# Patient Record
Sex: FEMALE | Race: WHITE | HISPANIC OR LATINO | Employment: OTHER | URBAN - METROPOLITAN AREA
[De-identification: names, ages, dates, MRNs, and addresses within clinical notes are randomized per-mention and may not be internally consistent; named-entity substitution may affect disease eponyms.]

---

## 2017-01-05 ENCOUNTER — ANESTHESIA (OUTPATIENT)
Dept: PERIOP | Facility: HOSPITAL | Age: 62
End: 2017-01-05

## 2017-01-05 ENCOUNTER — HOSPITAL ENCOUNTER (INPATIENT)
Facility: HOSPITAL | Age: 62
LOS: 4 days | Discharge: HOME HEALTH | End: 2017-01-09
Attending: ORTHOPAEDIC SURGERY | Admitting: ORTHOPAEDIC SURGERY

## 2017-01-05 ENCOUNTER — ANESTHESIA EVENT (OUTPATIENT)
Dept: PERIOP | Facility: HOSPITAL | Age: 62
End: 2017-01-05

## 2017-01-05 DIAGNOSIS — Z74.09 IMPAIRED FUNCTIONAL MOBILITY, BALANCE, GAIT, AND ENDURANCE: ICD-10-CM

## 2017-01-05 DIAGNOSIS — Z98.890 S/P DEBRIDEMENT: ICD-10-CM

## 2017-01-05 DIAGNOSIS — T81.31XD SURGICAL WOUND BREAKDOWN, SUBSEQUENT ENCOUNTER: ICD-10-CM

## 2017-01-05 DIAGNOSIS — S91.002D OPEN WOUND OF ANKLE WITH COMPLICATION, LEFT, SUBSEQUENT ENCOUNTER: Primary | ICD-10-CM

## 2017-01-05 PROBLEM — E78.5 HYPERLIPIDEMIA: Status: ACTIVE | Noted: 2017-01-05

## 2017-01-05 PROBLEM — Z87.81 STATUS POST ORIF OF FRACTURE OF ANKLE: Status: ACTIVE | Noted: 2017-01-05

## 2017-01-05 PROBLEM — T81.31XA SURGICAL WOUND BREAKDOWN: Status: ACTIVE | Noted: 2017-01-05

## 2017-01-05 PROBLEM — Z72.0 TOBACCO ABUSE: Status: ACTIVE | Noted: 2017-01-05

## 2017-01-05 LAB
ALBUMIN SERPL-MCNC: 4 G/DL (ref 3.2–4.8)
ALBUMIN/GLOB SERPL: 1.5 G/DL (ref 1.5–2.5)
ALP SERPL-CCNC: 80 U/L (ref 25–100)
ALT SERPL W P-5'-P-CCNC: 22 U/L (ref 7–40)
ANION GAP SERPL CALCULATED.3IONS-SCNC: 8 MMOL/L (ref 3–11)
AST SERPL-CCNC: 25 U/L (ref 0–33)
BASOPHILS # BLD AUTO: 0.01 10*3/MM3 (ref 0–0.2)
BASOPHILS NFR BLD AUTO: 0.2 % (ref 0–1)
BILIRUB SERPL-MCNC: 0.1 MG/DL (ref 0.3–1.2)
BUN BLD-MCNC: 8 MG/DL (ref 9–23)
BUN/CREAT SERPL: 11.4 (ref 7–25)
CALCIUM SPEC-SCNC: 9.5 MG/DL (ref 8.7–10.4)
CHLORIDE SERPL-SCNC: 106 MMOL/L (ref 99–109)
CK SERPL-CCNC: 75 U/L (ref 26–174)
CO2 SERPL-SCNC: 28 MMOL/L (ref 20–31)
CREAT BLD-MCNC: 0.7 MG/DL (ref 0.6–1.3)
CRP SERPL-MCNC: 4.9 MG/DL (ref 0–10)
DEPRECATED RDW RBC AUTO: 42.7 FL (ref 37–54)
EOSINOPHIL # BLD AUTO: 0.07 10*3/MM3 (ref 0.1–0.3)
EOSINOPHIL NFR BLD AUTO: 1.2 % (ref 0–3)
ERYTHROCYTE [DISTWIDTH] IN BLOOD BY AUTOMATED COUNT: 13 % (ref 11.3–14.5)
GFR SERPL CREATININE-BSD FRML MDRD: 85 ML/MIN/1.73
GLOBULIN UR ELPH-MCNC: 2.6 GM/DL
GLUCOSE BLD-MCNC: 118 MG/DL (ref 70–100)
HCT VFR BLD AUTO: 39.1 % (ref 34.5–44)
HGB BLD-MCNC: 13.2 G/DL (ref 11.5–15.5)
IMM GRANULOCYTES # BLD: 0.02 10*3/MM3 (ref 0–0.03)
IMM GRANULOCYTES NFR BLD: 0.4 % (ref 0–0.6)
LYMPHOCYTES # BLD AUTO: 1.07 10*3/MM3 (ref 0.6–4.8)
LYMPHOCYTES NFR BLD AUTO: 18.9 % (ref 24–44)
MCH RBC QN AUTO: 30 PG (ref 27–31)
MCHC RBC AUTO-ENTMCNC: 33.8 G/DL (ref 32–36)
MCV RBC AUTO: 88.9 FL (ref 80–99)
MONOCYTES # BLD AUTO: 0.11 10*3/MM3 (ref 0–1)
MONOCYTES NFR BLD AUTO: 1.9 % (ref 0–12)
NEUTROPHILS # BLD AUTO: 4.38 10*3/MM3 (ref 1.5–8.3)
NEUTROPHILS NFR BLD AUTO: 77.4 % (ref 41–71)
PLATELET # BLD AUTO: 225 10*3/MM3 (ref 150–450)
PMV BLD AUTO: 10.1 FL (ref 6–12)
POTASSIUM BLD-SCNC: 3.8 MMOL/L (ref 3.5–5.5)
PROT SERPL-MCNC: 6.6 G/DL (ref 5.7–8.2)
RBC # BLD AUTO: 4.4 10*6/MM3 (ref 3.89–5.14)
SODIUM BLD-SCNC: 142 MMOL/L (ref 132–146)
WBC NRBC COR # BLD: 5.66 10*3/MM3 (ref 3.5–10.8)

## 2017-01-05 PROCEDURE — 87116 MYCOBACTERIA CULTURE: CPT | Performed by: ORTHOPAEDIC SURGERY

## 2017-01-05 PROCEDURE — 25010000002 FENTANYL CITRATE (PF) 100 MCG/2ML SOLUTION: Performed by: NURSE ANESTHETIST, CERTIFIED REGISTERED

## 2017-01-05 PROCEDURE — 25010000002 BUPRENORPHINE PER 0.1 MG: Performed by: NURSE ANESTHETIST, CERTIFIED REGISTERED

## 2017-01-05 PROCEDURE — 87205 SMEAR GRAM STAIN: CPT | Performed by: ORTHOPAEDIC SURGERY

## 2017-01-05 PROCEDURE — 25010000002 MIDAZOLAM PER 1 MG: Performed by: NURSE ANESTHETIST, CERTIFIED REGISTERED

## 2017-01-05 PROCEDURE — 25010000002 VANCOMYCIN PER 500 MG: Performed by: ORTHOPAEDIC SURGERY

## 2017-01-05 PROCEDURE — 25010000002 ONDANSETRON PER 1 MG: Performed by: NURSE ANESTHETIST, CERTIFIED REGISTERED

## 2017-01-05 PROCEDURE — 25010000002 DEXAMETHASONE PER 1 MG: Performed by: NURSE ANESTHETIST, CERTIFIED REGISTERED

## 2017-01-05 PROCEDURE — 80053 COMPREHEN METABOLIC PANEL: CPT | Performed by: INTERNAL MEDICINE

## 2017-01-05 PROCEDURE — 25010000002 CEFTRIAXONE PER 250 MG: Performed by: ORTHOPAEDIC SURGERY

## 2017-01-05 PROCEDURE — 25010000002 DEXAMETHASONE SODIUM PHOSPHATE 10 MG/ML SOLUTION: Performed by: NURSE ANESTHETIST, CERTIFIED REGISTERED

## 2017-01-05 PROCEDURE — 87206 SMEAR FLUORESCENT/ACID STAI: CPT | Performed by: ORTHOPAEDIC SURGERY

## 2017-01-05 PROCEDURE — 82550 ASSAY OF CK (CPK): CPT | Performed by: INTERNAL MEDICINE

## 2017-01-05 PROCEDURE — 0QPJ04Z REMOVAL OF INTERNAL FIXATION DEVICE FROM RIGHT FIBULA, OPEN APPROACH: ICD-10-PCS | Performed by: ORTHOPAEDIC SURGERY

## 2017-01-05 PROCEDURE — 87102 FUNGUS ISOLATION CULTURE: CPT | Performed by: ORTHOPAEDIC SURGERY

## 2017-01-05 PROCEDURE — 87075 CULTR BACTERIA EXCEPT BLOOD: CPT | Performed by: ORTHOPAEDIC SURGERY

## 2017-01-05 PROCEDURE — 0HDMXZZ EXTRACTION OF RIGHT FOOT SKIN, EXTERNAL APPROACH: ICD-10-PCS | Performed by: ORTHOPAEDIC SURGERY

## 2017-01-05 PROCEDURE — 25010000002 PROPOFOL 10 MG/ML EMULSION: Performed by: NURSE ANESTHETIST, CERTIFIED REGISTERED

## 2017-01-05 PROCEDURE — 86140 C-REACTIVE PROTEIN: CPT | Performed by: INTERNAL MEDICINE

## 2017-01-05 PROCEDURE — 87176 TISSUE HOMOGENIZATION CULTR: CPT | Performed by: ORTHOPAEDIC SURGERY

## 2017-01-05 PROCEDURE — 87070 CULTURE OTHR SPECIMN AEROBIC: CPT | Performed by: ORTHOPAEDIC SURGERY

## 2017-01-05 PROCEDURE — 97162 PT EVAL MOD COMPLEX 30 MIN: CPT

## 2017-01-05 PROCEDURE — 85025 COMPLETE CBC W/AUTO DIFF WBC: CPT | Performed by: INTERNAL MEDICINE

## 2017-01-05 PROCEDURE — 88304 TISSUE EXAM BY PATHOLOGIST: CPT | Performed by: ORTHOPAEDIC SURGERY

## 2017-01-05 RX ORDER — LIDOCAINE HYDROCHLORIDE 10 MG/ML
1 INJECTION, SOLUTION EPIDURAL; INFILTRATION; INTRACAUDAL; PERINEURAL ONCE
Status: COMPLETED | OUTPATIENT
Start: 2017-01-05 | End: 2017-01-05

## 2017-01-05 RX ORDER — VANCOMYCIN HYDROCHLORIDE 1 G/200ML
1000 INJECTION, SOLUTION INTRAVENOUS ONCE
Status: COMPLETED | OUTPATIENT
Start: 2017-01-05 | End: 2017-01-05

## 2017-01-05 RX ORDER — PROMETHAZINE HYDROCHLORIDE 25 MG/1
25 SUPPOSITORY RECTAL ONCE AS NEEDED
Status: DISCONTINUED | OUTPATIENT
Start: 2017-01-05 | End: 2017-01-05 | Stop reason: HOSPADM

## 2017-01-05 RX ORDER — PROPOFOL 10 MG/ML
VIAL (ML) INTRAVENOUS AS NEEDED
Status: DISCONTINUED | OUTPATIENT
Start: 2017-01-05 | End: 2017-01-05 | Stop reason: SURG

## 2017-01-05 RX ORDER — PROMETHAZINE HYDROCHLORIDE 25 MG/1
25 TABLET ORAL ONCE AS NEEDED
Status: DISCONTINUED | OUTPATIENT
Start: 2017-01-05 | End: 2017-01-05 | Stop reason: HOSPADM

## 2017-01-05 RX ORDER — DEXAMETHASONE SODIUM PHOSPHATE 4 MG/ML
INJECTION, SOLUTION INTRA-ARTICULAR; INTRALESIONAL; INTRAMUSCULAR; INTRAVENOUS; SOFT TISSUE AS NEEDED
Status: DISCONTINUED | OUTPATIENT
Start: 2017-01-05 | End: 2017-01-05 | Stop reason: SURG

## 2017-01-05 RX ORDER — PROMETHAZINE HYDROCHLORIDE 25 MG/ML
12.5 INJECTION, SOLUTION INTRAMUSCULAR; INTRAVENOUS EVERY 6 HOURS PRN
Status: DISCONTINUED | OUTPATIENT
Start: 2017-01-05 | End: 2017-01-09 | Stop reason: HOSPADM

## 2017-01-05 RX ORDER — GABAPENTIN 400 MG/1
400 CAPSULE ORAL 3 TIMES DAILY
Status: DISCONTINUED | OUTPATIENT
Start: 2017-01-05 | End: 2017-01-07 | Stop reason: ALTCHOICE

## 2017-01-05 RX ORDER — OXYCODONE HYDROCHLORIDE AND ACETAMINOPHEN 5; 325 MG/1; MG/1
2 TABLET ORAL EVERY 4 HOURS PRN
Status: DISCONTINUED | OUTPATIENT
Start: 2017-01-05 | End: 2017-01-09 | Stop reason: HOSPADM

## 2017-01-05 RX ORDER — CEFTRIAXONE SODIUM 2 G/50ML
2 INJECTION, SOLUTION INTRAVENOUS EVERY 24 HOURS
Status: DISCONTINUED | OUTPATIENT
Start: 2017-01-06 | End: 2017-01-09 | Stop reason: HOSPADM

## 2017-01-05 RX ORDER — ACETAMINOPHEN 650 MG/1
650 SUPPOSITORY RECTAL EVERY 4 HOURS PRN
Status: DISCONTINUED | OUTPATIENT
Start: 2017-01-05 | End: 2017-01-09 | Stop reason: HOSPADM

## 2017-01-05 RX ORDER — PROMETHAZINE HYDROCHLORIDE 25 MG/ML
6.25 INJECTION, SOLUTION INTRAMUSCULAR; INTRAVENOUS ONCE AS NEEDED
Status: DISCONTINUED | OUTPATIENT
Start: 2017-01-05 | End: 2017-01-05 | Stop reason: HOSPADM

## 2017-01-05 RX ORDER — FAMOTIDINE 10 MG/ML
20 INJECTION, SOLUTION INTRAVENOUS ONCE
Status: DISCONTINUED | OUTPATIENT
Start: 2017-01-05 | End: 2017-01-05

## 2017-01-05 RX ORDER — FENTANYL CITRATE 50 UG/ML
INJECTION, SOLUTION INTRAMUSCULAR; INTRAVENOUS AS NEEDED
Status: DISCONTINUED | OUTPATIENT
Start: 2017-01-05 | End: 2017-01-05 | Stop reason: SURG

## 2017-01-05 RX ORDER — ONDANSETRON 4 MG/1
4 TABLET, FILM COATED ORAL EVERY 6 HOURS PRN
Status: DISCONTINUED | OUTPATIENT
Start: 2017-01-05 | End: 2017-01-05 | Stop reason: SDUPTHER

## 2017-01-05 RX ORDER — ONDANSETRON 4 MG/1
4 TABLET, FILM COATED ORAL EVERY 6 HOURS PRN
Status: DISCONTINUED | OUTPATIENT
Start: 2017-01-05 | End: 2017-01-05

## 2017-01-05 RX ORDER — LIDOCAINE HYDROCHLORIDE 10 MG/ML
INJECTION, SOLUTION EPIDURAL; INFILTRATION; INTRACAUDAL; PERINEURAL AS NEEDED
Status: DISCONTINUED | OUTPATIENT
Start: 2017-01-05 | End: 2017-01-05 | Stop reason: SURG

## 2017-01-05 RX ORDER — SENNA AND DOCUSATE SODIUM 50; 8.6 MG/1; MG/1
2 TABLET, FILM COATED ORAL 2 TIMES DAILY
Status: DISCONTINUED | OUTPATIENT
Start: 2017-01-05 | End: 2017-01-09 | Stop reason: HOSPADM

## 2017-01-05 RX ORDER — SACCHAROMYCES BOULARDII 250 MG
250 CAPSULE ORAL 2 TIMES DAILY
COMMUNITY
End: 2017-04-17

## 2017-01-05 RX ORDER — SODIUM CHLORIDE 0.9 % (FLUSH) 0.9 %
1-10 SYRINGE (ML) INJECTION AS NEEDED
Status: DISCONTINUED | OUTPATIENT
Start: 2017-01-05 | End: 2017-01-05 | Stop reason: HOSPADM

## 2017-01-05 RX ORDER — MIDAZOLAM HYDROCHLORIDE 1 MG/ML
INJECTION INTRAMUSCULAR; INTRAVENOUS AS NEEDED
Status: DISCONTINUED | OUTPATIENT
Start: 2017-01-05 | End: 2017-01-05 | Stop reason: SURG

## 2017-01-05 RX ORDER — SODIUM CHLORIDE, SODIUM LACTATE, POTASSIUM CHLORIDE, CALCIUM CHLORIDE 600; 310; 30; 20 MG/100ML; MG/100ML; MG/100ML; MG/100ML
9 INJECTION, SOLUTION INTRAVENOUS CONTINUOUS
Status: DISCONTINUED | OUTPATIENT
Start: 2017-01-05 | End: 2017-01-05 | Stop reason: SDUPTHER

## 2017-01-05 RX ORDER — DEXAMETHASONE SODIUM PHOSPHATE 10 MG/ML
INJECTION, SOLUTION INTRAMUSCULAR; INTRAVENOUS AS NEEDED
Status: DISCONTINUED | OUTPATIENT
Start: 2017-01-05 | End: 2017-01-05 | Stop reason: SURG

## 2017-01-05 RX ORDER — DEXTROSE, SODIUM CHLORIDE, AND POTASSIUM CHLORIDE 5; .45; .15 G/100ML; G/100ML; G/100ML
100 INJECTION INTRAVENOUS CONTINUOUS
Status: DISCONTINUED | OUTPATIENT
Start: 2017-01-05 | End: 2017-01-09 | Stop reason: HOSPADM

## 2017-01-05 RX ORDER — NALOXONE HCL 0.4 MG/ML
0.1 VIAL (ML) INJECTION
Status: DISCONTINUED | OUTPATIENT
Start: 2017-01-05 | End: 2017-01-09 | Stop reason: HOSPADM

## 2017-01-05 RX ORDER — HYDROMORPHONE HYDROCHLORIDE 1 MG/ML
0.5 INJECTION, SOLUTION INTRAMUSCULAR; INTRAVENOUS; SUBCUTANEOUS
Status: DISCONTINUED | OUTPATIENT
Start: 2017-01-05 | End: 2017-01-09 | Stop reason: HOSPADM

## 2017-01-05 RX ORDER — ONDANSETRON 2 MG/ML
INJECTION INTRAMUSCULAR; INTRAVENOUS AS NEEDED
Status: DISCONTINUED | OUTPATIENT
Start: 2017-01-05 | End: 2017-01-05 | Stop reason: SURG

## 2017-01-05 RX ORDER — MAGNESIUM HYDROXIDE 1200 MG/15ML
LIQUID ORAL AS NEEDED
Status: DISCONTINUED | OUTPATIENT
Start: 2017-01-05 | End: 2017-01-05 | Stop reason: HOSPADM

## 2017-01-05 RX ORDER — HYDROCODONE BITARTRATE AND ACETAMINOPHEN 7.5; 325 MG/1; MG/1
1 TABLET ORAL EVERY 4 HOURS PRN
Status: DISCONTINUED | OUTPATIENT
Start: 2017-01-05 | End: 2017-01-09 | Stop reason: HOSPADM

## 2017-01-05 RX ORDER — PROMETHAZINE HYDROCHLORIDE 12.5 MG/1
12.5 TABLET ORAL EVERY 6 HOURS PRN
Status: DISCONTINUED | OUTPATIENT
Start: 2017-01-05 | End: 2017-01-09 | Stop reason: HOSPADM

## 2017-01-05 RX ORDER — HYDROCODONE BITARTRATE AND ACETAMINOPHEN 7.5; 325 MG/1; MG/1
2 TABLET ORAL EVERY 4 HOURS PRN
Status: DISCONTINUED | OUTPATIENT
Start: 2017-01-05 | End: 2017-01-09 | Stop reason: HOSPADM

## 2017-01-05 RX ORDER — FENTANYL CITRATE 50 UG/ML
50 INJECTION, SOLUTION INTRAMUSCULAR; INTRAVENOUS
Status: DISCONTINUED | OUTPATIENT
Start: 2017-01-05 | End: 2017-01-05 | Stop reason: HOSPADM

## 2017-01-05 RX ORDER — BUPRENORPHINE HYDROCHLORIDE 0.32 MG/ML
INJECTION INTRAMUSCULAR; INTRAVENOUS AS NEEDED
Status: DISCONTINUED | OUTPATIENT
Start: 2017-01-05 | End: 2017-01-05 | Stop reason: SURG

## 2017-01-05 RX ORDER — ALPRAZOLAM 0.25 MG/1
0.25 TABLET ORAL 2 TIMES DAILY PRN
Status: DISCONTINUED | OUTPATIENT
Start: 2017-01-05 | End: 2017-01-09 | Stop reason: HOSPADM

## 2017-01-05 RX ORDER — SENNA AND DOCUSATE SODIUM 50; 8.6 MG/1; MG/1
1 TABLET, FILM COATED ORAL DAILY
Status: DISCONTINUED | OUTPATIENT
Start: 2017-01-05 | End: 2017-01-05 | Stop reason: SDUPTHER

## 2017-01-05 RX ORDER — VANCOMYCIN HYDROCHLORIDE 1 G/200ML
15 INJECTION, SOLUTION INTRAVENOUS EVERY 24 HOURS
Status: DISCONTINUED | OUTPATIENT
Start: 2017-01-06 | End: 2017-01-06

## 2017-01-05 RX ORDER — ALPRAZOLAM 0.25 MG/1
0.25 TABLET ORAL 2 TIMES DAILY PRN
COMMUNITY

## 2017-01-05 RX ORDER — BUPIVACAINE HYDROCHLORIDE 2.5 MG/ML
INJECTION, SOLUTION EPIDURAL; INFILTRATION; INTRACAUDAL AS NEEDED
Status: DISCONTINUED | OUTPATIENT
Start: 2017-01-05 | End: 2017-01-05 | Stop reason: SURG

## 2017-01-05 RX ORDER — SENNA AND DOCUSATE SODIUM 50; 8.6 MG/1; MG/1
1 TABLET, FILM COATED ORAL DAILY
COMMUNITY
End: 2017-04-17

## 2017-01-05 RX ORDER — DOCUSATE SODIUM 100 MG/1
100 CAPSULE, LIQUID FILLED ORAL 2 TIMES DAILY PRN
Status: DISCONTINUED | OUTPATIENT
Start: 2017-01-05 | End: 2017-01-09 | Stop reason: HOSPADM

## 2017-01-05 RX ORDER — ONDANSETRON 2 MG/ML
4 INJECTION INTRAMUSCULAR; INTRAVENOUS EVERY 6 HOURS PRN
Status: DISCONTINUED | OUTPATIENT
Start: 2017-01-05 | End: 2017-01-05

## 2017-01-05 RX ORDER — HYDRALAZINE HYDROCHLORIDE 20 MG/ML
10 INJECTION INTRAMUSCULAR; INTRAVENOUS EVERY 6 HOURS PRN
Status: DISCONTINUED | OUTPATIENT
Start: 2017-01-05 | End: 2017-01-09 | Stop reason: HOSPADM

## 2017-01-05 RX ORDER — NICOTINE 21 MG/24HR
1 PATCH, TRANSDERMAL 24 HOURS TRANSDERMAL DAILY
Status: DISCONTINUED | OUTPATIENT
Start: 2017-01-05 | End: 2017-01-09 | Stop reason: HOSPADM

## 2017-01-05 RX ORDER — BISACODYL 10 MG
10 SUPPOSITORY, RECTAL RECTAL DAILY PRN
Status: DISCONTINUED | OUTPATIENT
Start: 2017-01-05 | End: 2017-01-09 | Stop reason: HOSPADM

## 2017-01-05 RX ORDER — FAMOTIDINE 20 MG/1
20 TABLET, FILM COATED ORAL ONCE
Status: COMPLETED | OUTPATIENT
Start: 2017-01-05 | End: 2017-01-05

## 2017-01-05 RX ORDER — ACETAMINOPHEN 325 MG/1
650 TABLET ORAL EVERY 4 HOURS PRN
Status: DISCONTINUED | OUTPATIENT
Start: 2017-01-05 | End: 2017-01-09 | Stop reason: HOSPADM

## 2017-01-05 RX ORDER — SACCHAROMYCES BOULARDII 250 MG
250 CAPSULE ORAL 2 TIMES DAILY
Status: DISCONTINUED | OUTPATIENT
Start: 2017-01-05 | End: 2017-01-09 | Stop reason: HOSPADM

## 2017-01-05 RX ORDER — MEPERIDINE HYDROCHLORIDE 25 MG/ML
12.5 INJECTION INTRAMUSCULAR; INTRAVENOUS; SUBCUTANEOUS
Status: DISCONTINUED | OUTPATIENT
Start: 2017-01-05 | End: 2017-01-05 | Stop reason: HOSPADM

## 2017-01-05 RX ORDER — OXYCODONE HYDROCHLORIDE AND ACETAMINOPHEN 5; 325 MG/1; MG/1
1 TABLET ORAL EVERY 4 HOURS PRN
Status: DISCONTINUED | OUTPATIENT
Start: 2017-01-05 | End: 2017-01-09 | Stop reason: HOSPADM

## 2017-01-05 RX ORDER — HYDROMORPHONE HYDROCHLORIDE 1 MG/ML
0.5 INJECTION, SOLUTION INTRAMUSCULAR; INTRAVENOUS; SUBCUTANEOUS
Status: DISCONTINUED | OUTPATIENT
Start: 2017-01-05 | End: 2017-01-05 | Stop reason: HOSPADM

## 2017-01-05 RX ORDER — NICOTINE 21 MG/24HR
1 PATCH, TRANSDERMAL 24 HOURS TRANSDERMAL DAILY
Status: DISCONTINUED | OUTPATIENT
Start: 2017-01-05 | End: 2017-01-05 | Stop reason: SDUPTHER

## 2017-01-05 RX ORDER — DIPHENHYDRAMINE HCL 25 MG
25 CAPSULE ORAL EVERY 6 HOURS PRN
Status: DISCONTINUED | OUTPATIENT
Start: 2017-01-05 | End: 2017-01-09 | Stop reason: HOSPADM

## 2017-01-05 RX ORDER — ONDANSETRON 2 MG/ML
4 INJECTION INTRAMUSCULAR; INTRAVENOUS EVERY 6 HOURS PRN
Status: DISCONTINUED | OUTPATIENT
Start: 2017-01-05 | End: 2017-01-05 | Stop reason: SDUPTHER

## 2017-01-05 RX ADMIN — LIDOCAINE HYDROCHLORIDE 0.2 ML: 10 INJECTION, SOLUTION EPIDURAL; INFILTRATION; INTRACAUDAL; PERINEURAL at 12:40

## 2017-01-05 RX ADMIN — MIDAZOLAM HYDROCHLORIDE 2 MG: 1 INJECTION, SOLUTION INTRAMUSCULAR; INTRAVENOUS at 13:11

## 2017-01-05 RX ADMIN — Medication 250 MG: at 17:01

## 2017-01-05 RX ADMIN — FENTANYL CITRATE 100 MCG: 50 INJECTION, SOLUTION INTRAMUSCULAR; INTRAVENOUS at 13:11

## 2017-01-05 RX ADMIN — DOCUSATE SODIUM AND SENNOSIDES 2 TABLET: 8.6; 5 TABLET, FILM COATED ORAL at 17:01

## 2017-01-05 RX ADMIN — GABAPENTIN 400 MG: 400 CAPSULE ORAL at 20:57

## 2017-01-05 RX ADMIN — PROPOFOL 150 MG: 10 INJECTION, EMULSION INTRAVENOUS at 14:17

## 2017-01-05 RX ADMIN — NICOTINE 1 PATCH: 21 PATCH, EXTENDED RELEASE TRANSDERMAL at 17:01

## 2017-01-05 RX ADMIN — LIDOCAINE HYDROCHLORIDE 50 MG: 10 INJECTION, SOLUTION EPIDURAL; INFILTRATION; INTRACAUDAL; PERINEURAL at 14:17

## 2017-01-05 RX ADMIN — VANCOMYCIN HYDROCHLORIDE 1 G: 1 INJECTION, SOLUTION INTRAVENOUS at 14:53

## 2017-01-05 RX ADMIN — ONDANSETRON 4 MG: 2 INJECTION INTRAMUSCULAR; INTRAVENOUS at 14:59

## 2017-01-05 RX ADMIN — BUPRENORPHINE HYDROCHLORIDE 0.3 MG: 0.3 INJECTION INTRAMUSCULAR; INTRAVENOUS at 13:11

## 2017-01-05 RX ADMIN — POTASSIUM CHLORIDE, DEXTROSE MONOHYDRATE AND SODIUM CHLORIDE 100 ML/HR: 150; 5; 450 INJECTION, SOLUTION INTRAVENOUS at 18:07

## 2017-01-05 RX ADMIN — FENTANYL CITRATE 50 MCG: 50 INJECTION, SOLUTION INTRAMUSCULAR; INTRAVENOUS at 14:17

## 2017-01-05 RX ADMIN — FAMOTIDINE 20 MG: 20 TABLET, FILM COATED ORAL at 12:55

## 2017-01-05 RX ADMIN — CEFTRIAXONE 2 G: 1 INJECTION, POWDER, FOR SOLUTION INTRAMUSCULAR; INTRAVENOUS at 14:44

## 2017-01-05 RX ADMIN — DEXAMETHASONE SODIUM PHOSPHATE 4 MG: 10 INJECTION, SOLUTION INTRAMUSCULAR; INTRAVENOUS at 13:11

## 2017-01-05 RX ADMIN — BUPIVACAINE HYDROCHLORIDE 45 ML: 2.5 INJECTION, SOLUTION EPIDURAL; INFILTRATION; INTRACAUDAL; PERINEURAL at 13:11

## 2017-01-05 RX ADMIN — DEXAMETHASONE SODIUM PHOSPHATE 8 MG: 4 INJECTION, SOLUTION INTRAMUSCULAR; INTRAVENOUS at 14:17

## 2017-01-05 RX ADMIN — GABAPENTIN 400 MG: 400 CAPSULE ORAL at 17:01

## 2017-01-05 RX ADMIN — SODIUM CHLORIDE, POTASSIUM CHLORIDE, SODIUM LACTATE AND CALCIUM CHLORIDE 9 ML/HR: 600; 310; 30; 20 INJECTION, SOLUTION INTRAVENOUS at 12:40

## 2017-01-05 NOTE — PLAN OF CARE
Problem: Patient Care Overview (Adult)  Goal: Adult Individualization and Mutuality  Outcome: Ongoing (interventions implemented as appropriate)    01/05/17 1221   Individualization   Patient Specific Preferences none   Patient Specific Goals none   Patient Specific Interventions none   Mutuality/Individual Preferences   What Anxieties, Fears or Concerns Do You Have About Your Health or Care? none   What Questions Do You Have About Your Health or Care? none   What Information Would Help Us Give You More Personalized Care? none

## 2017-01-05 NOTE — BRIEF OP NOTE
ANKLE/FOOT HARDWARE REMOVAL  Procedure Note    Gloria Oropeza  1/5/2017    Pre-op Diagnosis:   * wound dehisence, exposed hardware right ankle medial and lateral malleolus *    Post-op Diagnosis:     same    Procedure/CPT® Codes:      Procedure(s):  RIGHT ANKLE HARDWARE REMOVAL WITH IRRIGATION AND DEBRIDEMENT OF ANKLE WOUNDS    Surgeon(s):  Cristina Lee MD    Anesthesia: General    Staff:   Circulator: Yordan Zafar RN; Aletha Park RN  Scrub Person: Tyler Correa RN; Whitney Denson  Assistant: KAM Keyes  [place]: Flora Maharaj RN    Estimated Blood Loss: * 5cc *    Specimens:                  ID Type Source Tests Collected by Time Destination   1 : right ankle wound Tissue Ankle, Right ANAEROBIC CULTURE, FUNGAL CULTURE, TISSUE CULTURE, AFB CULTURE Cristina Lee MD 1/5/2017 1446    A : right ankle - skin necrosis - exposed hardware Tissue Ankle, Right TISSUE EXAM Cristina Lee MD 1/5/2017 1443          Drains:    none       Findings: exposed hardware, necrotic skin medial and lateral right ankle    Complications: none      Cirstina Lee MD     Date: 1/5/2017  Time: 3:02 PM

## 2017-01-05 NOTE — PLAN OF CARE
Problem: Patient Care Overview (Adult)  Goal: Plan of Care Review  Outcome: Ongoing (interventions implemented as appropriate)    01/05/17 1221   Coping/Psychosocial Response Interventions   Plan Of Care Reviewed With patient   Patient Care Overview   Progress improving

## 2017-01-05 NOTE — H&P
[facility] Pre-op    Full history and physical note from office is up to date.  See paper copy on chart.    There were no vitals taken for this visit.    IMM:  Influenza:  No  Pneumococcal:  No  Tetanus:  Unknown      SALVATORE Madrigal 1/5/2017 12:32 PM

## 2017-01-05 NOTE — OP NOTE
DATE OF PROCEDURE:  01/05/2017    SURGEON: Cristina Brandon MD    ASSISTANT: Renae Dozier PA-C, who was present for the entire procedure including prepping, draping, retraction, closure, and dressing.     ANESTHESIA: General with popliteal and adductor blocks.     PREOPERATIVE DIAGNOSIS: Wound dehiscence with exposed hardware, right ankle medial and lateral malleolus.     POSTOPERATIVE DIAGNOSIS: Wound dehiscence with exposed hardware, right ankle medial and lateral malleolus.     PROCEDURE PERFORMED: Extensive irrigation and debridement medial and lateral soft tissue and removal of hardware right ankle.     INDICATIONS: The patient is a very pleasant 61-year-old status post open reduction and internal fixation of a right ankle fracture on 10/20/2016. The patient is a smoker. I counseled her regarding the 4 times greater risk of complications for foot and ankle surgery in smokers. The patient decreased but did not quit smoking. She has had very slowly healing incisions. I saw her in 12/2016 and the fractures were healed radiographically. I allowed her to begin weight-bearing. Sometime between yesterday and then she noted hardware visible in the wound. She spoke with a neighbor who did not think it was infected. She has not had any fevers or chills. She notes that the wounds have closed up a little bit since then. I saw her in the office yesterday and found the exposed hardware. I recommend we treat this as an urgent problem. The plan is for removal of the hardware, irrigation and debridement of the wounds, probable wound VAC and at least 6 weeks of IV antibiotics. At the time of surgery, I found the exposed hardware. There were necrotic skin edges. There was very little inflammatory response, only slight swelling, no purulence and no significant erythema. Deep cultures were obtained per routine and antibiotics were started after the cultures.     DESCRIPTION OF PROCEDURE: The patient was taken to the operating room  where general anesthesia was induced without difficulty. She was given the preoperative blocks. The right leg was prepped and draped in the usual sterile fashion with a well-padded tourniquet on the thigh. The appropriate timeout was called. The leg was elevated. The tourniquet was inflated to 350 mmHg. Tourniquet time was 11 minutes. I scraped away the necrotic debris on the medial and lateral incisions and extended them as needed to expose all of the hardware. The hardware was removed without difficulty. I obtained deep cultures. The bone seemed to be viable. The fractures look healed. Tourniquet was released. I debrided all of the skin edges back to bleeding tissue. She was then given antibiotics. The incisions were irrigated copiously with antibiotic solution using pulsatile lavage. The most distal centimeters and the proximal 3 cm of the lateral wound were able to be closed with interrupted far near-near far nylon sutures. The rest of the wounds could not be closed due to missing tissue and were left open. We planned for wound VAC placement tomorrow. They were dressed with wet saline gauze. She was placed in a three-sided splint. She was awakened, extubated, and transferred to the recovery room in stable condition.     POSTOPERATIVE PLAN: Admission for IV antibiotics. Dr. Hinton of infectious disease was consulted. She will have a wound VAC placed tomorrow.     ESTIMATED BLOOD LOSS: Less than 5 mL.     COMPLICATIONS: None.     SPECIMENS: As above.      MD JOSETTE Rhoades/hilda  DD: 01/05/2017 15:19:59  DT: 01/05/2017 17:17:30  Voice Rec. ID #[id number]  Voice Original ID #[id number]  Doc ID #[id number]  Rev. #0  cc:

## 2017-01-05 NOTE — CONSULTS
INFECTIOUS DISEASE CONSULT/INITIAL HOSPITAL VISIT    Gloria Oropeza  1955  [id number]    Date of consult: 1/5/2017    Admit date: 1/5/2017    Requesting Provider: Cristina Lee MD  Evaluating physician: Tien Hinton MD  Reason for Consultation: right ankle osteomyelitis, wound infxn  Chief Complaint: right ankle wound infxn      Subjective   History of present illness:  Patient is a very pleasant  61 y.o.  Yr old female with a history of right open reduction internal fixation of a bimalleolar fracture (jumped off a 4 garvey) on 10/20/16.  She had an unremarkable postoperative course and was healing well.  However patient did continue to smoke.  Cast was removed in December 2016.  Patient then developed some exposure of hardware over the ensuing weeks and did not seek medical attention.  Was informed by a friend that despite seeing the metal on the outside of her ankle that it was not likely infected.  Patient was evaluated by Dr. Lee who is now taking the patient to the operating room for hardware removal.  Patient has not had a high fevers or chills.  She has no other localizing signs or symptoms of infection.  I was consulted by  on 1/5/17 for further evaluation and treatment.  [facility]     Past Medical History   Diagnosis Date   • Arthritis    • Breast cancer    • Fracture      right ankle   • Hyperlipidemia    • Wears eyeglasses    • Wears partial dentures      upper       Past Surgical History   Procedure Laterality Date   • Breast lumpectomy Right    • Colonoscopy       2004 approx   • Mouth surgery     • Ankle open reduction internal fixation Right 10/20/2016     Procedure: RIGHT ANKLE OPEN REDUCTION INTERNAL FIXATION;  Surgeon: Cristina Lee MD;  [facility];  Service:        Pediatric History   Patient Guardian Status   • Not on file.     Other Topics Concern   • Not on file     Social History Narrative        family history is not on file.    Allergies   Allergen Reactions   • Sulfa Antibiotics Nausea And Vomiting         There is no immunization history on file for this patient.    Medication:    Current Facility-Administered Medications:   •  albuterol (PROVENTIL HFA;VENTOLIN HFA) inhaler 2 puff, 2 puff, Inhalation, 4x Daily - RT, SALVATORE Null  •  [START ON 1/6/2017] enoxaparin (LOVENOX) syringe 40 mg, 40 mg, Subcutaneous, Q24H, Cristina Lee MD  •  FentaNYL Citrate (PF) (SUBLIMAZE) injection 50 mcg, 50 mcg, Intravenous, Q5 Min PRN, Kaylie Law CRNA  •  gabapentin (NEURONTIN) capsule 400 mg, 400 mg, Oral, TID, SALVATORE Null  •  HYDROcodone-acetaminophen (NORCO) 7.5-325 MG per tablet 1 tablet, 1 tablet, Oral, Q4H PRN, Cristina Lee MD  •  HYDROcodone-acetaminophen (NORCO) 7.5-325 MG per tablet 2 tablet, 2 tablet, Oral, Q4H PRN, Cristina Lee MD  •  HYDROmorphone PF (DILAUDID) injection 0.5 mg, 0.5 mg, Intravenous, Q5 Min PRN, Kaylie Law CRNA  •  lactated ringers bolus 500 mL, 500 mL, Intravenous, Once PRN, Kaylie Law CRNA  •  lactated ringers infusion, 9 mL/hr, Intravenous, Continuous, Surjit Burton MD, Last Rate: 9 mL/hr at 01/05/17 1240, 9 mL/hr at 01/05/17 1240  •  meperidine (DEMEROL) injection 12.5 mg, 12.5 mg, Intravenous, Q5 Min PRN, Kaylie Law CRNA  •  nicotine (NICODERM CQ) 21 MG/24HR patch 1 patch, 1 patch, Transdermal, Daily, SALVATORE Null  •  ondansetron (ZOFRAN) tablet 4 mg, 4 mg, Oral, Q6H PRN, SALVATORE Null  •  oxyCODONE-acetaminophen (PERCOCET) 5-325 MG per tablet 1 tablet, 1 tablet, Oral, Q4H PRN, Cristina Lee MD  •  oxyCODONE-acetaminophen (PERCOCET) 5-325 MG per tablet 2 tablet, 2 tablet, Oral, Q4H PRN, Cristina Lee MD  •  promethazine (PHENERGAN) injection 6.25 mg, 6.25 mg, Intravenous, Once PRN **OR** promethazine (PHENERGAN) injection 6.25 mg, 6.25 mg, Intramuscular, Once PRN **OR** promethazine (PHENERGAN)  "suppository 25 mg, 25 mg, Rectal, Once PRN **OR** promethazine (PHENERGAN) tablet 25 mg, 25 mg, Oral, Once PRN, Kaylie Law, MAURISIO  •  saccharomyces boulardii (FLORASTOR) capsule 250 mg, 250 mg, Oral, BID, Emily Merrill, APRN  •  sennosides-docusate sodium (SENOKOT-S) 8.6-50 MG tablet 1 tablet, 1 tablet, Oral, Daily, Emily Merrill, APRN    Please refer to the medical record for a full medication list    Review of Systems:    Constitutional-- No Fever, chills or sweats.  Appetite good, and no malaise. No fatigue.  HEENT-- No new vision, hearing or throat complaints.  No epistaxis or oral sores.  Denies odynophagia or dysphagia.  No odynophagia or dysphagia. No headache, photophobia or neck stiffness.  CV-- No chest pain, palpitation or syncope  Resp-- No SOB/cough/Hemoptysis  GI- No nausea, vomiting, or diarrhea.  No hematochezia, melena, or hematemesis. Denies jaundice or chronic liver disease.  -- No dysuria, hematuria, or flank pain.  Denies hesitancy, urgency or flank pain.  Lymph- no swollen lymph nodes in neck/axilla or groin.   Heme- No active bruising or bleeding; no Hx of DVT or PE.  MS-- no swelling or pain in the bones or joints of arms/legs.  No new back pain.  Neuro-- No acute focal weakness or numbness in the arms or legs.  No seizures.  Skin--No rashes or lesions, Right ankle wound with issues as per history of present illness.    Physical Exam:   Vital Signs   Temp:  [97.5 °F (36.4 °C)-98.3 °F (36.8 °C)] 98.1 °F (36.7 °C)  Heart Rate:  [69-77] 69  Resp:  [16-18] 16  BP: (119-136)/(59-74) 127/72    Blood pressure 127/72, pulse 69, temperature 98.1 °F (36.7 °C), resp. rate 16, height 62\" (157.5 cm), weight 135 lb (61.2 kg), SpO2 96 %.  GENERAL:Sedated waiting for surgery, in no acute distress. Appears older than stated age.  HEENT:  Normocephalic, atraumatic.  Oropharynx without thrush. Dentition in good repair. No cervical adenopathy. No neck masses.  Ears externally normal, Nose " externally normal.  EYES: PERRL. No conjunctival injection. No icterus. EOM full.  LYMPHATICS: No lymphadenopathy of the neck or axillary or inguinal regions.   HEART: No murmur, gallop, or pericardial friction rub. Reg rate rhythm, No JVD at 45 degrees.  LUNGS: Clear to auscultation and percussion. No respiratory distress, no use of accessory muscles.  ABDOMEN: Soft, nontender, nondistended. No appreciable HSM.  Bowel sounds normal.  GENITAL: No external lesions, breasts without masses, back straight, no CVAT, rectal external without lesions.   SKIN: Warm and dry without cutaneous eruptions.  No nodules. Right ankle with medial malleolus prosthetic metal visible, And laterally with some mild erythema without pus or foul smell or crepitus.    PSYCHIATRIC: Mental status sedated. No confusion.  EXT:  No cellulitic change.  NEURO:Sedated and unable to test but previous examinations unremarkable in terms of motor and sensory deficits, cerebellar and gait not tested.      Results Review:   I reviewed the patient's new clinical results.  WBC 3.50 - 10.80 10*3/mm3 10.50   RBC 3.89 - 5.14 10*6/mm3 4.51   Hemoglobin 11.5 - 15.5 g/dL 13.0   Hematocrit 34.5 - 44.0 % 40.0   MCV 80.0 - 99.0 fL 88.7   MCH 27.0 - 31.0 pg 28.8   MCHC 32.0 - 36.0 g/dL 32.5   RDW 11.3 - 14.5 % 13.3   RDW-SD 37.0 - 54.0 fl 43.2   MPV 6.0 - 12.0 fL 9.5   Platelets 150 - 450 10*3/mm3 220   Neutrophil % 41.0 - 71.0 % 58.6   Lymphocyte % 24.0 - 44.0 % 31.8   Monocyte % 0.0 - 12.0 % 7.4   Eosinophil % 0.0 - 3.0 % 1.8   Basophil % 0.0 - 1.0 % 0.2   Immature Grans % 0.0 - 0.6 % 0.2   Neutrophils, Absolute 1.50 - 8.30 10*3/mm3 6.15   Lymphocytes, Absolute 0.60 - 4.80 10*3/mm3 3.34   Monocytes, Absolute 0.00 - 1.00 10*3/mm3 0.78   Eosinophils, Absolute 0.10 - 0.30 10*3/mm3 0.19   Basophils, Absolute 0.00 - 0.20 10*3/mm3 0.02   Immature Grans, Absolute 0.00 - 0.03 10*3/mm3 0.02   Resulting Agency  [facility]      Specimen Collected: 10/19/16  3:20 PM Last  Resulted: 10/19/16  3:41 PM        Glucose 70 - 100 mg/dL 90   BUN 9 - 23 mg/dL 11   Creatinine 0.60 - 1.30 mg/dL 0.80   Sodium 132 - 146 mmol/L 142   Potassium 3.5 - 5.5 mmol/L 3.7   Chloride 99 - 109 mmol/L 104   CO2 20.0 - 31.0 mmol/L 29.0   Calcium 8.7 - 10.4 mg/dL 9.0   eGFR Non African Amer >60 mL/min/1.73 73   BUN/Creatinine Ratio 7.0 - 25.0 13.8   Anion Gap 3.0 - 11.0 mmol/L 9.0                                     CrCl cannot be calculated (Patient has no serum creatinine result on file.).    Microbiology:  1/5/17 cx right ankle pending.    Radiology:  Imaging Results (last 72 hours)     ** No results found for the last 72 hours. **          IMPRESSION:     1.  Right ankle prosthetic device infection with possible tracking to the bone with osteomyelitis given the recent ORIF on 10/10/16, with exposed hardware.  Usual organisms are related to skin quinten including staph and strep versus other.  No evidence of abscess or necrotizing fasciitis.  2.  Right bimalleolar fracture with ORIF repair 10/20/16.  3.  Ongoing smoking, which may adversely affect healing of wounds including her right ankle.    RECOMMENDATIONS:    1.  Diagnostically, continue to follow patient's physical exam, CBC, CMP, CRP, ESR, cultures from the right ankle for routine culture and sensitivity, AFB, fungus, and also check CPK.  2.  Therapeutically, consider using vancomycin dosing to a trough of 12-18, and Rocephin 2 g IV daily.  She is likely to need 4-6 weeks of IV antibiotics.  3.  PICC line for long-term venous access.    I discussed the patients findings and my recommendations with patient and primary care team    Thank you for asking me to see Gloria Oropeza.  Our group would be pleased to follow this patient over the course of their hospitalization and assist with outpatient antimicrobial therapy, as indicated.  Further recommendations depend on the results of the cultures and clinical course.    Tien Hinton MD  1/5/2017

## 2017-01-05 NOTE — H&P
Patient Name: Golria Oropeza  MRN: [id number]  : 1955  DOS: 2017    Attending: Cristina Lee MD    Primary Care Provider: Muarli Scott MD      Chief complaint:  wound dehisence    Subjective   Patient is a 61 y.o. female presented for right ankle hardware removal with irrigation and debridement of ankle wounds by Dr. Lee. She tolerated surgery well and is admitted for further medical management. She had ORIF right ankle in Oct. 2016. When seen in office for follow up by Dr. Lee, she noted exposed hardware in the ankle wound.    When seen in PACU she is sedated from GA. She appears to be resting comfortably. Oral airway still in place.  Per : ((The patient is a very pleasant 61-year-old status post open reduction and internal fixation of a right ankle fracture on 10/20/2016. The patient is a smoker. I counseled her regarding the 4 times greater risk of complications for foot and ankle surgery in smokers. The patient decreased but did not quit smoking. She has had very slowly healing incisions. I saw her in 2016 and the fractures were healed radiographically. I allowed her to begin weight-bearing. Sometime between yesterday and then she noted hardware visible in the wound. She spoke with a neighbor who did not think it was infected. She has not had any fevers or chills. She notes that the wounds have closed up a little bit since then. I saw her in the office yesterday and found the exposed hardware. I recommend we treat this as an urgent problem. The plan is for removal of the hardware, irrigation and debridement of the wounds, probable wound VAC and at least 6 weeks of IV antibiotics. At the time of surgery, I found the exposed hardware. There were necrotic skin edges. There was very little inflammatory response, only slight swelling, no purulence and no significant erythema. Deep cultures were obtained per routine and antibiotics were started after the cultures))     Seen  in her room later. Doing well. Good pain control. No f/c/n/vom/sob. wy  Allergies:  Allergies   Allergen Reactions   • Sulfa Antibiotics Nausea And Vomiting       Meds:  Prescriptions Prior to Admission   Medication Sig Dispense Refill Last Dose   • ALPRAZolam (XANAX) 0.25 MG tablet Take 0.25 mg by mouth 2 (Two) Times a Day As Needed for anxiety.   1/4/2017 at Unknown time   • gabapentin (NEURONTIN) 400 MG capsule Take 400 mg by mouth 3 (Three) Times a Day.   1/4/2017 at 2100   • HYDROcodone-acetaminophen (NORCO)  MG per tablet Take 1 tablet by mouth Every 6 (Six) Hours As Needed for moderate pain (4-6).   1/4/2017 at 2300   • nicotine (NICODERM CQ) 21 MG/24HR patch Place 1 patch on the skin Daily. 30 patch 5 1/4/2017 at Unknown time   • saccharomyces boulardii (FLORASTOR) 250 MG capsule Take 250 mg by mouth 2 (Two) Times a Day.   1/2/2017   • sennosides-docusate sodium (SENOKOT-S) 8.6-50 MG tablet Take 1 tablet by mouth Daily.   12/5/2016   • aspirin 81 MG EC tablet Take 1 tablet by mouth Daily. 60 tablet 5 1/2/2017   • ondansetron (ZOFRAN) 4 MG tablet Take 1 tablet by mouth Every 6 (Six) Hours As Needed for nausea. 30 tablet 0 1/2/2017       History:   Past Medical History   Diagnosis Date   • Arthritis    • Breast cancer    • Fracture      right ankle   • Hyperlipidemia    • Wears eyeglasses    • Wears partial dentures      upper     Past Surgical History   Procedure Laterality Date   • Breast lumpectomy Right    • Colonoscopy       2004 approx   • Mouth surgery     • Ankle open reduction internal fixation Right 10/20/2016     Procedure: RIGHT ANKLE OPEN REDUCTION INTERNAL FIXATION;  Surgeon: Cristina Lee MD;  Location: [facility] OR;  Service:      History reviewed. No pertinent family history.  Social History   Substance Use Topics   • Smoking status: Current Every Day Smoker     Packs/day: 1.00     Types: Cigarettes   • Smokeless tobacco: Never Used   • Alcohol use No       Review of Systems  Pertinent  "items are noted in HPI, all other systems reviewed and negative    Vital Signs  Visit Vitals   • /61 (BP Location: Right arm, Patient Position: Lying)   • Pulse 71   • Temp 97.8 °F (36.6 °C) (Temporal Artery )   • Resp 16   • Ht 62\" (157.5 cm)   • Wt 135 lb (61.2 kg)   • SpO2 96%   • BMI 24.69 kg/m2       Physical Exam:    General Appearance:    Sedated, in no acute distress   Head:    Normocephalic, without obvious abnormality, atraumatic   Ears:    Ears appear intact with no abnormalities noted   Throat:   No oral lesions, no thrush, oral mucosa moist   Neck:   No adenopathy, supple, trachea midline, no thyromegaly, no     carotid bruit, no JVD   Lungs:     Wheezing upper lobes to auscultation, respirations regular, even and unlabored    Heart:    Regular rhythm and normal rate, normal S1 and S2, no            murmur, no gallop, no rub, no click   Abdomen:     Normal bowel sounds, no masses, no organomegaly, soft        non-tender, non-distended, no guarding, no rebound                 tenderness   Genitalia:    Deferred   Extremities:   Right ankle with splint CDI.    Pulses:   Pulses palpable and equal bilaterally   Skin:   No bleeding, bruising or rash      I reviewed the patient's new clinical results.     Results for NOLVIA NULL (MRN [id number]) as of 1/5/2017 14:19   Ref. Range 10/19/2016 15:20   Glucose Latest Ref Range: 70 - 100 mg/dL 90   Sodium Latest Ref Range: 132 - 146 mmol/L 142   Potassium Latest Ref Range: 3.5 - 5.5 mmol/L 3.7   CO2 Latest Ref Range: 20.0 - 31.0 mmol/L 29.0   Chloride Latest Ref Range: 99 - 109 mmol/L 104   Anion Gap Latest Ref Range: 3.0 - 11.0 mmol/L 9.0   Creatinine Latest Ref Range: 0.60 - 1.30 mg/dL 0.80   BUN Latest Ref Range: 9 - 23 mg/dL 11   BUN/Creatinine Ratio Latest Ref Range: 7.0 - 25.0  13.8   Calcium Latest Ref Range: 8.7 - 10.4 mg/dL 9.0   eGFR Non African Amer Latest Ref Range: >60 mL/min/1.73 73   Hemoglobin A1C Latest Ref Range: 4.00 - 6.00 % 5.50 "   WBC Latest Ref Range: 3.50 - 10.80 10*3/mm3 10.50   RBC Latest Ref Range: 3.89 - 5.14 10*6/mm3 4.51   Hemoglobin Latest Ref Range: 11.5 - 15.5 g/dL 13.0   Hematocrit Latest Ref Range: 34.5 - 44.0 % 40.0   RDW Latest Ref Range: 11.3 - 14.5 % 13.3   MCV Latest Ref Range: 80.0 - 99.0 fL 88.7   MCH Latest Ref Range: 27.0 - 31.0 pg 28.8   MCHC Latest Ref Range: 32.0 - 36.0 g/dL 32.5   MPV Latest Ref Range: 6.0 - 12.0 fL 9.5   Platelets Latest Ref Range: 150 - 450 10*3/mm3 220       Assessment and Plan:   Principle Problem:    S/P right ankle hardware removal with irrigation and debridement of ankle wounds  Active Problems:    ORIF of fracture of ankle 10/20/2016    Hyperlipidemia    Tobacco abuse      Plan  1. PT/OT- NWB right foot  2. Pain control-prns   3. IS-encourage  4. DVT proph- [facility]  5. Bowel regimen  6. Resume home medications as appropriate  7. Monitor post-op labs  8. DC planning  Nicotine patch. Counseled on tobacco cessation. Patient already trying to quit, using patch.   Down to about 3 cig daily.wy  Scheduled nebs   ID consult    SALVATORE Null  01/05/17  3:09 PM    Seen and examined by me. Agree with above. Discussed with patient.

## 2017-01-05 NOTE — PROGRESS NOTES
Acute Care - Physical Therapy Initial Evaluation  [facility]     Patient Name: Gloria Oropeza  : 1955  MRN: [id number]  Today's Date: 2017   Onset of Illness/Injury or Date of Surgery Date: 17  Date of Referral to PT: 17  Referring Physician: MD Jesus      Admit Date: 2017     Visit Dx:    ICD-10-CM ICD-9-CM   1. Impaired functional mobility, balance, gait, and endurance Z74.09 V49.89   2. Surgical wound breakdown, subsequent encounter T81.31XD V58.89     998.32     Patient Active Problem List   Diagnosis   • Ankle fracture, right   • Closed right ankle fracture   • ORIF of fracture of ankle 10/20/2016   • Hyperlipidemia   • Tobacco abuse   • Surgical wound breakdown     Past Medical History   Diagnosis Date   • Arthritis    • Breast cancer    • Fracture      right ankle   • Hyperlipidemia    • Wears eyeglasses    • Wears partial dentures      upper     Past Surgical History   Procedure Laterality Date   • Breast lumpectomy Right    • Colonoscopy        approx   • Mouth surgery     • Ankle open reduction internal fixation Right 10/20/2016     Procedure: RIGHT ANKLE OPEN REDUCTION INTERNAL FIXATION;  Surgeon: Cristina Lee MD;  Location: [facility];  Service:           PT ASSESSMENT (last 72 hours)      PT Evaluation       17 1633 17 1249    Rehab Evaluation    Document Type evaluation  -MJ     Subjective Information agree to therapy;no complaints  -MJ     Patient Effort, Rehab Treatment good  -MJ     Symptoms Noted During/After Treatment none  -MJ     General Information    Patient Profile Review yes  -MJ     Onset of Illness/Injury or Date of Surgery Date 17  -MJ     Referring Physician MD Jesus  -MJ     General Observations Pt supine in bed, IV, ace bandage/splint to R ankle. Friend present  -MJ     Pertinent History Of Current Problem Pt presents for surgical removal of wound debridement of R ankle. Pt underwent R ankle ORIF 10/2016 after sustaining  trimalleolar fx from jumping off a four garvey  -     Precautions/Limitations fall precautions;non-weight bearing status   NWB R LE  -MJ     Prior Level of Function min assist:;all household mobility;community mobility;gait;independent:;transfer;bed mobility   Pt has been using rollator and NWB since sx, indep prior to  -MJ     Equipment Currently Used at Home other (see comments)   rollator  -MJ walker, standard  -MB    Plans/Goals Discussed With patient;agreed upon  -     Risks Reviewed patient:;LOB;increased discomfort  -MJ     Benefits Reviewed patient:;improve function;increase independence;increase strength;increase balance;decrease pain  -MJ     Barriers to Rehab none identified  -MJ     Living Environment    Lives With alone  - alone  -MB    Living Arrangements house  -MJ house  -MB    Home Accessibility stairs within home;tub/shower is not walk in  -MJ bed and bath on same level;no concerns  -MB    Number of Stairs Within Home 1  -MJ     Stair Railings at Home  none  -MB    Type of Financial/Environmental Concern  none  -MB    Transportation Available  car  -MB    Clinical Impression    Date of Referral to PT 01/05/17  -MJ     PT Diagnosis s/p R ankle hardware removal, I&D  -MJ     Criteria for Skilled Therapeutic Interventions Met yes;treatment indicated  -     Pain Assessment    Pain Assessment No/denies pain  -MJ     Cognitive Assessment/Intervention    Current Cognitive/Communication Assessment functional  -     Orientation Status oriented x 4  -MJ     Follows Commands/Answers Questions 100% of the time;able to follow multi-step instructions;needs cueing  -     Personal Safety WNL/WFL  -MJ     ROM (Range of Motion)    General ROM no range of motion deficits identified  -MJ     MMT (Manual Muscle Testing)    General MMT Assessment no strength deficits identified  -MJ     General MMT Assessment Detail R ankle NT  -MJ     Mobility Assessment/Training    Extremity Weight-Bearing Status right  lower extremity  -MJ     Right Lower Extremity Weight-Bearing non weight-bearing  -MJ     Bed Mobility, Assessment/Treatment    Bed Mobility, Assistive Device head of bed elevated  -MJ     Bed Mob, Supine to Sit, [place] conditional independence  -MJ     Bed Mob, Sit to Supine, [place] conditional independence  -MJ     Transfer Assessment/Treatment    Transfers, Sit-Stand [place] contact guard assist;verbal cues required  -MJ     Transfers, Stand-Sit [place] contact guard assist;verbal cues required  -MJ     Transfers, Sit-Stand-Sit, Assist Device rolling walker  -MJ     Transfer, Maintain Weight Bearing Status able to maintain weight bearing status  -MJ     Transfer, Safety Issues step length decreased;weight-shifting ability decreased  -MJ     Transfer, Impairments impaired balance  -MJ     Transfer, Comment Verbal cues for correct hand placement and to maintain NWB status R LE  -MJ     Gait Assessment/Treatment    Gait, [place] Level contact guard assist;verbal cues required  -MJ     Gait, Assistive Device rolling walker  -MJ     Gait, Distance (Feet) 4   bed to BSC, BSC to bed  -MJ     Gait, Gait Deviations [place] decreased;step length decreased;weight-shifting ability decreased  -MJ     Gait, Maintain Weight Bearing Status able to maintain weight bearing status;cues to maintain weight bearing status  -MJ     Gait, Safety Issues balance decreased during turns;step length decreased;weight-shifting ability decreased  -MJ     Gait, Impairments impaired balance  -MJ     Gait, Comment Pt demo hop to gait pattern to t/f to/from BSC. Verbal cues for WBing status  -MJ     Positioning and Restraints    Pre-Treatment Position in bed  -MJ     Post Treatment Position bed  -MJ     In Bed notified nsg;supine;call light within reach;encouraged to call for assist  -MJ       User Key  (r) = Recorded By, (t) = Taken By, (c) = Cosigned By    Initials Name Provider Type    TIM Tesfaye, RN  Registered Nurse    DAMARIS Bosch, PT Physical Therapist          Physical Therapy Education     Title: PT OT SLP Therapies (Active)     Topic: Physical Therapy (Active)     Point: Mobility training (Done)    Learning Progress Summary    Learner Readiness Method Response Comment Documented by Status   Patient Acceptance E,D KAY   01/05/17 1659 Done               Point: Body mechanics (Done)    Learning Progress Summary    Learner Readiness Method Response Comment Documented by Status   Patient Acceptance E,D KAY   01/05/17 1659 Done               Point: Precautions (Done)    Learning Progress Summary    Learner Readiness Method Response Comment Documented by Status   Patient Acceptance E,D [facility] 01/05/17 1659 Done                      User Key     Initials Effective Dates Name Provider Type Discipline     03/14/16 -  Giovany Bosch PT Physical Therapist PT                PT Recommendation and Plan  Anticipated Discharge Disposition: home with assist  Planned Therapy Interventions: balance training, bed mobility training, home exercise program, patient/family education, stair training, strengthening, transfer training  PT Frequency: daily  Plan of Care Review  Plan Of Care Reviewed With: patient  Progress: improving  Outcome Summary/Follow up Plan: PT eval completed. PT will follow to improve balance, to progress functional mobility with NWB status and to promote return to PLOF with increased independence and safety. Attempt knee walker tomorrow if pt agreeable, refused today. Plan is home with assist           IP PT Goals       01/05/17 1659          Bed Mobility PT LTG    Bed Mobility PT LTG, Date Established 01/05/17  -MJ      Bed Mobility PT LTG, Time to Achieve 5 days  -MJ      Bed Mobility PT LTG, Activity Type supine to sit/sit to supine  -MJ      Bed Mobility PT LTG, [place] Level independent  -MJ      Transfer Training PT LTG    Transfer Training PT LTG, Date Established 01/05/17  -MJ      Transfer  Training PT LTG, Time to Achieve 5 days  -MJ      Transfer Training PT LTG, Activity Type sit to stand/stand to sit  -MJ      Transfer Training PT LTG, [place] Level conditional independence  -MJ      Transfer Training PT LTG, Assist Device walker, rolling  -MJ      Gait Training PT LTG    Gait Training Goal PT LTG, Date Established 01/05/17  -MJ      Gait Training Goal PT LTG, Time to Achieve 5 days  -MJ      Gait Training Goal PT LTG, [place] Level conditional independence  -MJ      Gait Training Goal PT LTG, Assist Device walker, rolling  -MJ      Gait Training Goal PT LTG, Distance to Achieve 150 feet  -MJ      Stair Training PT LTG    Stair Training Goal PT LTG, Date Established 01/05/17  -MJ      Stair Training Goal PT LTG, Time to Achieve 5 days  -MJ      Stair Training Goal PT LTG, Number of Steps 1  -MJ      Stair Training Goal PT LTG, [place] Level supervision required  -MJ      Stair Training Goal PT LTG, Assist Device walker, rolling   backward  -MJ        User Key  (r) = Recorded By, (t) = Taken By, (c) = Cosigned By    Initials Name Provider Type    DAMARIS Bosch PT Physical Therapist                Outcome Measures       01/05/17 1633          How much help from another person do you currently need...    Turning from your back to your side while in flat bed without using bedrails? 4  -MJ      Moving from lying on back to sitting on the side of a flat bed without bedrails? 4  -MJ      Moving to and from a bed to a chair (including a wheelchair)? 3  -MJ      Standing up from a chair using your arms (e.g., wheelchair, bedside chair)? 3  -MJ      Climbing 3-5 steps with a railing? 2  -MJ      To walk in hospital room? 3  -MJ      AM-PAC 6 Clicks Score 19  -MJ      Functional Assessment    Outcome Measure Options AM-PAC 6 Clicks Basic Mobility (PT)  -MJ        User Key  (r) = Recorded By, (t) = Taken By, (c) = Cosigned By    Initials Name Provider Type    DAMARIS Bosch PT Physical  Therapist           Time Calculation:         PT Charges       01/05/17 1702          Time Calculation    Start Time 1633  -MJ      PT Received On 01/05/17  -MJ      PT Goal Re-Cert Due Date 01/15/17  -      Time Calculation- PT    Total Timed Code Minutes- PT 0 minute(s)  -        User Key  (r) = Recorded By, (t) = Taken By, (c) = Cosigned By    Initials Name Provider Type    MJ Giovany Bosch PT Physical Therapist          Therapy Charges for Today     Code Description Service Date Service Provider Modifiers Qty    08952379359 HC PT EVAL MOD COMPLEXITY 3 1/5/2017 Giovany Bosch, PT GP 1    19979830541 HC PT THER SUPP EA 15 MIN 1/5/2017 Giovany Bosch, PT GP 2          PT G-Codes  Outcome Measure Options: AM-PAC 6 Clicks Basic Mobility (PT)      Giovany Bosch PT  1/5/2017

## 2017-01-05 NOTE — IP AVS SNAPSHOT
AFTER VISIT SUMMARY             Gloria Oropeza           About your hospitalization     You were admitted on:  January 5, 2017 You last received care in the:  [facility]       Procedures & Surgeries      Procedure(s) (LRB):  RIGHT ANKLE HARDWARE REMOVAL WITH IRRIGATION AND DEBRIDEMENT OF ANKLE WOUNDS (Right)     1/5/2017     Surgeon(s):  Cristina Lee MD  -------------------      Medications    If you or your caregiver advised us that you are currently taking a medication and that medication is marked below as “Resume”, this simply indicates that we have reviewed those medications to make sure our new therapy recommendations do not interfere.  If you have concerns about medications other than those new ones which we are prescribing today, please consult the physician who prescribed them (or your primary physician).  Our review of your home medications is not meant to indicate that we are directing their use.             Your Medications      START taking these medications     cefTRIAXone 40 MG/ML IVPB   Infuse 50 mL into a venous catheter Daily. Per [facility]  Indications: Skin and Soft Tissue Infection   Last time this was given:  1/9/2017  2:21 PM   Commonly known as:  ROCEPHIN           DAPTOmycin 500 mg in sodium chloride 0.9 % 50 mL   Infuse 500 mg into a venous catheter Daily. Per [facility]  Indications: Bone and Joint Infection   Last time this was given:  1/8/2017  9:34 PM           enoxaparin 40 MG/0.4ML solution syringe   Inject 0.4 mL under the skin Daily. For 2 weeks   Last time this was given:  1/9/2017  8:28 AM   Commonly known as:  LOVENOX             CHANGE how you take these medications     aspirin 81 MG EC tablet   Take 1 tablet by mouth Daily. Resume in 2 weeks   What changed:  additional instructions           gabapentin 300 MG capsule   Take 2 capsules by mouth Every 8 (Eight) Hours.   Last time this was given:  1/9/2017  2:21 PM   Commonly known as:  NEURONTIN   What changed:       - medication strength  - how much to take  - when to take this             CONTINUE taking these medications     ALPRAZolam 0.25 MG tablet   Take 0.25 mg by mouth 2 (Two) Times a Day As Needed for anxiety.   Last time this was given:  1/7/2017  8:35 AM   Commonly known as:  XANAX           HYDROcodone-acetaminophen  MG per tablet   Take 1 tablet by mouth Every 6 (Six) Hours As Needed for moderate pain (4-6).   Commonly known as:  NORCO           nicotine 21 MG/24HR patch   Place 1 patch on the skin Daily for 28 days.   Last time this was given:  1/9/2017  8:30 AM   Commonly known as:  NICODERM CQ           ondansetron 4 MG tablet   Take 1 tablet by mouth Every 6 (Six) Hours As Needed for nausea.   Commonly known as:  ZOFRAN           saccharomyces boulardii 250 MG capsule   Take 250 mg by mouth 2 (Two) Times a Day.   Last time this was given:  1/9/2017  8:28 AM   Commonly known as:  FLORASTOR           sennosides-docusate sodium 8.6-50 MG tablet   Take 1 tablet by mouth Daily.   Last time this was given:  1/8/2017  5:57 PM   Commonly known as:  SENOKOT-S                Where to Get Your Medications      These medications were sent to [facility] [place] - [address] - [phone]  - [phone] FX  [address] [facility] [place] [address]     Phone:  [phone]     enoxaparin 40 MG/0.4ML solution syringe    gabapentin 300 MG capsule         You can get these medications from any pharmacy     Bring a paper prescription for each of these medications     nicotine 21 MG/24HR patch         Information about where to get these medications is not yet available     ! Ask your nurse or doctor about these medications     aspirin 81 MG EC tablet    cefTRIAXone 40 MG/ML IVPB    DAPTOmycin 500 mg in sodium chloride 0.9 % 50 mL                  Your Medications      Your Medication List           Morning Noon Evening Bedtime As Needed    ALPRAZolam 0.25 MG tablet   Take 0.25 mg by mouth 2 (Two)  Times a Day As Needed for anxiety.   Commonly known as:  XANAX                                   aspirin 81 MG EC tablet   Take 1 tablet by mouth Daily. Resume in 2 weeks                                   cefTRIAXone 40 MG/ML IVPB   Infuse 50 mL into a venous catheter Daily. Per [facility]  Indications: Skin and Soft Tissue Infection   Commonly known as:  ROCEPHIN             2:00 pm 01/10/2017                     DAPTOmycin 500 mg in sodium chloride 0.9 % 50 mL   Infuse 500 mg into a venous catheter Daily. Per [facility]  Indications: Bone and Joint Infection                                   enoxaparin 40 MG/0.4ML solution syringe   Inject 0.4 mL under the skin Daily. For 2 weeks   Commonly known as:  LOVENOX                                   gabapentin 300 MG capsule   Take 2 capsules by mouth Every 8 (Eight) Hours.   Commonly known as:  NEURONTIN                                HYDROcodone-acetaminophen  MG per tablet   Take 1 tablet by mouth Every 6 (Six) Hours As Needed for moderate pain (4-6).   Commonly known as:  NORCO                                   nicotine 21 MG/24HR patch   Place 1 patch on the skin Daily for 28 days.   Commonly known as:  NICODERM CQ                                   ondansetron 4 MG tablet   Take 1 tablet by mouth Every 6 (Six) Hours As Needed for nausea.   Commonly known as:  ZOFRAN                                   saccharomyces boulardii 250 MG capsule   Take 250 mg by mouth 2 (Two) Times a Day.   Commonly known as:  FLORASTOR                 6:00 PM                 sennosides-docusate sodium 8.6-50 MG tablet   Take 1 tablet by mouth Daily.   Commonly known as:  SENOKOT-S                 6:00 PM                        Instructions for After Discharge        Activity Instructions     Do NOT bear any weight on your Right leg. Keep your right foot elevated.            Diet Instructions     You may resume a regular diet.             Discharge Instructions       You have a PICC line in  place. The dressing was changed 01/08/2017. The dressing is due to be changed again on 01/15/2017. [facility] office will provide IV antibiotics, teaching, PICC line dressing changes, and lab work as needed.     [facility] will provide you with home health. They will contact you within 1-3 days to set up your first appointment. If they do not contact you within this time frame, you may contact them at [phone].     Home infusion is set up through [facility].     Discharge References/Attachments     HARDWARE REMOVAL, CARE AFTER (ENGLISH)    WOUND DEBRIDEMENT, CARE AFTER (ENGLISH)    CAST OR SPLINT CARE, EASY-TO-READ (ENGLISH)    PICC HOME GUIDE (ENGLISH)    VACUUM-ASSISTED CLOSURE THERAPY HOME GUIDE (ENGLISH)    CEFTRIAXONE INJECTION (ENGLISH)    DAPTOMYCIN INJECTION (ENGLISH)    ENOXAPARIN INJECTION (ENGLISH)    ASPIRIN, ASA ORAL TABLETS (ENGLISH)    NICOTINE SKIN PATCHES (ENGLISH)       Follow-ups for After Discharge        Follow-up Information     Follow up with Cristina Vang MD. Go on 1/18/2017.    Specialty:  Orthopedic Surgery    Why:  10:00 AM    Contact information:    [address]  [place] [address]  [phone]          Follow up with Murali Scott MD .    Specialty:  Internal Medicine    Contact information:    [address]  [place] [address]  [phone]          Follow up with Tien Hinton MD. Go on 1/16/2017.    Specialty:  Infectious Diseases    Why:  2:15 PM    Contact information:    [address]  [place] [address]  [phone]          Follow up with [email or web address] .    Contact information:    [phone]      Referrals and Follow-ups to Schedule     Ambulatory Referral to Home Health    As directed    Face to Face Visit Date:  1/9/2017   Follow-up Provider for Plan of Care?:  I will be treating the patient on an ongoing basis.  Please send me the Plan of Care for signature.   Follow-up Provider:  CRISTINA VANG   Reason/Clinical Findings:   right ankle hardware removal with irrigation and debridement   Describe mobility limitations that make leaving home difficult:  non-weight bearing on right ankle   Nursing/Therapeutic Services Requested:   Other  Skilled Nursing      Skilled nursing orders:  Wound vac application and instruction   Frequency:  1 Week 1       Follow-Up    As directed    Dr. Lee per her orders  [facility] for IV antibiotics             Scheduled Appointments     Jan 11, 2017  2:30 PM EST   INITIAL EVALUATION - WOUND CARE with Janeth Weber PT   [facility] ([address] [address] [phone]              [employer] Signup     Our records indicate that you have an active [facility] [employer] account.    You can view your After Visit Summary by going to [employer] and logging in with your [employer] username and password.  If you don't have a [employer] username and password but a parent or guardian has access to your record, the parent or guardian should login with their own [employer] username and password and access your record to view the After Visit Summary.    If you have questions, you can email [email or web address] or call [phone] to talk to our [employer] staff.  Remember, [employer] is NOT to be used for urgent needs.  For medical emergencies, dial 911.           Summary of Your Hospitalization        Reason for Hospitalization     Your primary diagnosis was:  S/P Debridement    Your diagnoses also included:  Status Post Orif Of Fracture Of Ankle, High Cholesterol Or Triglycerides, Tobacco Abuse, Rupture Of Operation Wound      Care Providers     Provider Service Role Specialty    Cristina Lee MD Surgery Orthopedic Attending Provider Orthopedic Surgery    Cristina Lee MD Surgery Orthopedic Surgeon  Orthopedic Surgery    Crystal Anna MD Medicine Consulting Physician  Hospitalist    Tien Hinton MD Infectious  Disease Consulting Physician  Infectious Diseases       Your Allergies  Date Reviewed: 1/5/2017    Allergen Reactions    Sulfa Antibiotics Nausea And Vomiting      Pending Labs     Order Current Status    Fungus Culture In process    AFB Culture Preliminary result    Anaerobic Culture Preliminary result      Patient Belongings Returned     Document Return of Belongings Flowsheet     Were the patient bedside belongings sent home?   Yes   Belongings Retrieved from Security & Sent Home   N/A    Belongings Sent to Safe   --   Medications Retrieved from Pharmacy & Sent Home   No (Comment) (N/A)              More Information      Hardware Removal, Care After  Refer to this sheet in the next few weeks. These instructions provide you with information about caring for yourself after your procedure. Your health care provider may also give you more specific instructions. Your treatment has been planned according to current medical practices, but problems sometimes occur. Call your health care provider if you have any problems or questions after your procedure.  WHAT TO EXPECT AFTER THE PROCEDURE  After your procedure, it is common to have:  · Some pain.  · Nausea.  · Some swelling in the area where hardware was removed.  HOME CARE INSTRUCTIONS  · Take medicines only as directed by your health care provider.  · Follow instructions from your health care provider about:  ¨ Rest.  ¨ Physical activity.  ¨ Bearing weight.  SEEK MEDICAL CARE IF:  · You have lasting pain.  · You are unable to perform exercises or physical activity as directed by your health care provider.  SEEK IMMEDIATE MEDICAL CARE IF:  · You have severe pain.  · You have a fever or chills.  · You have redness, heat, swelling, or pain at the site of your incision.  · You have fluid, blood, or pus coming from your incision.  · You have difficulty breathing.  · You cannot pass gas.  · You are unable to have a bowel movement within 2 days.  · You have numbness for more  than 24 hours in the area where hardware was removed.     This information is not intended to replace advice given to you by your health care provider. Make sure you discuss any questions you have with your health care provider.     Document Released: 05/03/2016 Document Reviewed: 05/03/2016  [employer] Interactive Patient Education ©2016 [employer]          Wound Debridement, Care After  Refer to this sheet in the next few weeks. These instructions provide you with information on caring for yourself after your procedure. Your caregiver may also give you more specific instructions. Your treatment has been planned according to current medical practices, but problems sometimes occur. Call your caregiver if you have any problems or questions after your procedure.   HOME CARE INSTRUCTIONS   · Only take over-the-counter or prescription medicines as directed by your caregiver. Do not take any other medicines without asking your caregiver first.  · If antibiotic medicines are prescribed, take them as directed. Finish them even if you start to feel better.  · Care for your wound as directed by your caregiver.     Wash your hands thoroughly before and after changing your dressings.    Keep the wound and bandages (dressings) clean and dry. Change dressings as directed.    Apply any prescribed medicines to the wound as directed. Do not use anything else without asking your caregiver first.  · Do not shower or bathe until your caregiver approves. Take sponge baths until you get approval.   · Limit activities or movements as directed by your caregiver.  · Protect the wound from further injury until it is healed.  · Do not drive until you are no longer taking pain medicine or until your caregiver approves.  · Keep all follow-up appointments.    SEEK MEDICAL CARE IF:   · You have pain even after taking pain medicine.  · You have trouble changing your dressings.  SEEK IMMEDIATE MEDICAL CARE IF:  · Your pain increases.    · You  have a fever.  MAKE SURE YOU:  · Understand these instructions.  · Will watch your condition.  · Will get help right away if you are not doing well or get worse.     This information is not intended to replace advice given to you by your health care provider. Make sure you discuss any questions you have with your health care provider.     Document Released: 12/04/2013 Document Revised: 01/08/2016 Document Reviewed: 04/27/2016  [employer] Interactive Patient Education ©2016 [employer]          Cast or Splint Care  Casts and splints support injured limbs and keep bones from moving while they heal.   HOME CARE  · Keep the cast or splint uncovered during the drying period.  ¨ A plaster cast can take 24 to 48 hours to dry.  ¨ A fiberglass cast will dry in less than 1 hour.  · Do not rest the cast on anything harder than a pillow for 24 hours.  · Do not put weight on your injured limb. Do not put pressure on the cast. Wait for your doctor's approval.  · Keep the cast or splint dry.  ¨ Cover the cast or splint with a plastic bag during baths or wet weather.  ¨ If you have a cast over your chest and belly (trunk), take sponge baths until the cast is taken off.  ¨ If your cast gets wet, dry it with a towel or blow dryer. Use the cool setting on the blow dryer.  · Keep your cast or splint clean. Wash a dirty cast with a damp cloth.  · Do not put any objects under your cast or splint.  · Do not scratch the skin under the cast with an object. If itching is a problem, use a blow dryer on a cool setting over the itchy area.  · Do not trim or cut your cast.  · Do not take out the padding from inside your cast.  · Exercise your joints near the cast as told by your doctor.  · Raise (elevate) your injured limb on 1 or 2 pillows for the first 1 to 3 days.  GET HELP IF:  · Your cast or splint cracks.  · Your cast or splint is too tight or too loose.  · You itch badly under the cast.  · Your cast gets wet or has a soft spot.  · You  "have a bad smell coming from the cast.  · You get an object stuck under the cast.  · Your skin around the cast becomes red or sore.  · You have new or more pain after the cast is put on.  GET HELP RIGHT AWAY IF:  · You have fluid leaking through the cast.  · You cannot move your fingers or toes.  · Your fingers or toes turn blue or white or are cool, painful, or puffy (swollen).  · You have tingling or lose feeling (numbness) around the injured area.  · You have bad pain or pressure under the cast.  · You have trouble breathing or have shortness of breath.  · You have chest pain.     This information is not intended to replace advice given to you by your health care provider. Make sure you discuss any questions you have with your health care provider.     Document Released: 04/18/2012 Document Revised: 08/20/2014 Document Reviewed: 06/26/2014  [employer] Interactive Patient Education ©2016 [employer]          PICC Home Guide  A peripherally inserted central catheter (PICC) is a long, thin, flexible tube that is inserted into a vein in the upper arm. It is a form of intravenous (IV) access. It is considered to be a \"central\" line because the tip of the PICC ends in a large vein in your chest. This large vein is called the superior vena cava (SVC). The PICC tip ends in the SVC because there is a lot of blood flow in the SVC. This allows medicines and IV fluids to be quickly distributed throughout the body. The PICC is inserted using a sterile technique by a specially trained nurse or physician. After the PICC is inserted, a chest X-ray exam is done to be sure it is in the correct place.   A PICC may be placed for different reasons, such as:  · To give medicines and liquid nutrition that can only be given through a central line. Examples are:    Certain antibiotic treatments.    Chemotherapy.    Total parenteral nutrition (TPN).  · To take frequent blood samples.  · To give IV fluids and blood products.  · If there is " "difficulty placing a peripheral intravenous (PIV) catheter.  If taken care of properly, a PICC can remain in place for several months. A PICC can also allow a person to go home from the hospital early. Medicine and PICC care can be managed at home by a family member or home health care team.  WHAT PROBLEMS CAN HAPPEN WHEN I HAVE A PICC?  Problems with a PICC can occasionally occur. These may include the following:  · A blood clot (thrombus) forming in or at the tip of the PICC. This can cause the PICC to become clogged. A clot-dissolving medicine called tissue plasminogen activator (tPA) can be given through the PICC to help break up the clot.  · Inflammation of the vein (phlebitis) in which the PICC is placed. Signs of inflammation may include redness, pain at the insertion site, red streaks, or being able to feel a \"cord\" in the vein where the PICC is located.  · Infection in the PICC or at the insertion site. Signs of infection may include fever, chills, redness, swelling, or pus drainage from the PICC insertion site.  · PICC movement (malposition). The PICC tip may move from its original position due to excessive physical activity, forceful coughing, sneezing, or vomiting.  · A break or cut in the PICC. It is important to not use scissors near the PICC.  · Nerve or tendon irritation or injury during PICC insertion.  WHAT SHOULD I KEEP IN MIND ABOUT ACTIVITIES WHEN I HAVE A PICC?  · You may bend your arm and move it freely. If your PICC is near or at the bend of your elbow, avoid activity with repeated motion at the elbow.  · Rest at home for the remainder of the day following PICC line insertion.  · Avoid lifting heavy objects as instructed by your health care provider.  · Avoid using a crutch with the arm on the same side as your PICC. You may need to use a walker.  WHAT SHOULD I KNOW ABOUT MY PICC DRESSING?  · Keep your PICC bandage (dressing) clean and dry to prevent infection.  ¨ Ask your health care " "provider when you may shower. Ask your health care provider to teach you how to wrap the PICC when you do take a shower.  · Change the PICC dressing as instructed by your health care provider.  · Change your PICC dressing if it becomes loose or wet.  WHAT SHOULD I KNOW ABOUT PICC CARE?  · Check the PICC insertion site daily for leakage, redness, swelling, or pain.  · Do not take a bath, swim, or use hot tubs when you have a PICC. Cover PICC line with clear plastic wrap and tape to keep it dry while showering.  · Flush the PICC as directed by your health care provider. Let your health care provider know right away if the PICC is difficult to flush or does not flush. Do not use force to flush the PICC.  · Do not use a syringe that is less than 10 mL to flush the PICC.  · Never pull or tug on the PICC.  · Avoid blood pressure checks on the arm with the PICC.  · Keep your PICC identification card with you at all times.  · Do not take the PICC out yourself. Only a trained clinical professional should remove the PICC.  SEEK IMMEDIATE MEDICAL CARE IF:  · Your PICC is accidentally pulled all the way out. If this happens, cover the insertion site with a bandage or gauze dressing. Do not throw the PICC away. Your health care provider will need to inspect it.  · Your PICC was tugged or pulled and has partially come out. Do not  push the PICC back in.  · There is any type of drainage, redness, or swelling where the PICC enters the skin.  · You cannot flush the PICC, it is difficult to flush, or the PICC leaks around the insertion site when it is flushed.  · You hear a \"flushing\" sound when the PICC is flushed.  · You have pain, discomfort, or numbness in your arm, shoulder, or jaw on the same side as the PICC.  · You feel your heart \"racing\" or skipping beats.  · You notice a hole or tear in the PICC.  · You develop chills or a fever.  MAKE SURE YOU:   · Understand these instructions.  · Will watch your condition.  · Will get " help right away if you are not doing well or get worse.     This information is not intended to replace advice given to you by your health care provider. Make sure you discuss any questions you have with your health care provider.     Document Released: 06/23/2004 Document Revised: 01/08/2016 Document Reviewed: 08/25/2014  [employer] Interactive Patient Education ©2016 [employer]          Vacuum-Assisted Closure Therapy Home Guide  Vacuum-assisted closure therapy (VAC therapy) is a device that helps wounds heal. It is used on wounds that cannot be closed with stitches. They often heal slowly. VAC therapy helps the wound stay clean and healthy while its edges slowly grow back together.  VAC therapy uses a bandage (dressing) that is made of foam. It is put inside the wound. Then, a drape is placed over the wound. This drape sticks to your skin (adhesive) to keep air out. A tube is hooked up to a small pump and is attached to the drape. The pump sucks fluid and germs from the wound. It can also decrease any bad smell that comes from the wound.  RISKS AND COMPLICATIONS  VAC therapy is usually safe to use at home. Your skin may get sore from the adhesive drape. That is the most common problem. However, more serious problems can develop, such as:   · Bleeding. This can happen if the dressing in the wound comes into contact with blood vessels. A little bleeding may occur when the dressing is being changed. This is normal now and then. Major bleeding can happen if a large blood vessel breaks. This is more likely if you are taking blood-thinning medicine. Emergency surgery may be needed.  · Infection. This can happen if the dressing has an air leak that is not repaired within a couple of hours.  · Dehydration. This can happen if the pump sucks out too much body fluid.  DRESSING CHANGES  Your dressing will have to be changed. Sometimes this is needed once a day. Other times, a dressing change must be done 3 times a week. How  often you change your dressing will depend on what your wound is like. A trained caregiver will most likely change the dressing. However, a family member or friend may be trained to change the dressing. Below are steps to change a dressing in order to prevent an infection. The steps apply to you or the person that changes your dressing.  · Wash your hands with soap and water before and after each dressing change.  · Wear gloves and protective clothing. This may include eye protection.  · Do not allow anyone to change your dressing if they have an infection or a skin condition. Even a small cut can be a problem.  To change the dressing:   · Turn off the pump.  · Take off the adhesive drape.  · Disconnect the tube from the dressing.  · Take out the dressing that is inside the wound. If the dressing sticks, use a germ-free (sterile), saltwater solution to wet the dressing. This helps it come out more easily. If it hurts when the dressing is changed, take pain medicine 30 minutes before the dressing change.  · Cleanse the wound with normal saline or sterile water.  · Apply a skin barrier film to the skin that will be covered with the drape. This will protect the skin.  · Put a new dressing into the wound.  · Apply a new drape and tube.  · Replace the container in the pump that collects fluid if it is full. Do this at least once per week.  · Turn the pump back on.  · Your doctor will decide what setting of suction is best. Do not change the settings on the machine without talking to your nurse or doctor.  HOME CARE INSTRUCTIONS   · The VAC pump has an alarm. It goes off if there are any problems such as a leak.    Ask your caregiver what to do if the alarm goes off.    Call your caregiver right away if the alarm goes off and you cannot fix the problem.  · Do not turn off the pump for more than 2 hours.  · Check your wound carefully at each dressing change for signs of infection. Watch for redness, swelling, or any fluid  leaking from the wound. If you develop an infection:    You may have to stop VAC therapy.    The wound will need to be cleaned and washed out.    You will have to take antibiotic medicine.  · Ask your caregiver what activities you should or should not do while you are getting VAC therapy. This will depend on your particular wound.  · Ask if it is okay to turn off the pump so you can take a shower. If it is okay, make sure the wound is covered with plastic. The wound area must stay dry.  · Drink enough fluids to keep your urine clear or pale yellow.  · Eat foods that contain a lot of protein. Examples are meat, poultry, seafood, eggs, nuts, beans, and peas. Protein can help your wound heal.  SEEK MEDICAL CARE IF:  · Your wound itches or hurts.  · Dressing changes are often painful or bleeding often occurs.  · You have a headache.  · You have diarrhea.  · You have a sore throat.  · You have a rash.  · You feel nauseous.  · You feel dizzy or weak.  SEEK IMMEDIATE MEDICAL CARE IF:   · You have very bad pain.  · You have bleeding that will not stop.  · Your wound smells bad.  · You have redness, swelling, or fluid leaking from your wound.  · Your alarm goes off and you do not know what to do.  · You have a fever.     This information is not intended to replace advice given to you by your health care provider. Make sure you discuss any questions you have with your health care provider.     Document Released: 03/11/2013 Document Reviewed: 03/11/2013  [employer] Patient Education ©2016 [employer]          Ceftriaxone injection  What is this medicine?  CEFTRIAXONE (sef try AX one) is a cephalosporin antibiotic. It is used to treat certain kinds of bacterial infections. It will not work for colds, flu, or other viral infections.  This medicine may be used for other purposes; ask your health care provider or pharmacist if you have questions.  What should I tell my health care provider before I take this  medicine?  They need to know if you have any of these conditions:  -any chronic illness  -bowel disease, like colitis  -both kidney and liver disease  -high bilirubin level in  patients  -an unusual or allergic reaction to ceftriaxone, other cephalosporin or penicillin antibiotics, foods, dyes, or preservatives  -pregnant or trying to get pregnant  -breast-feeding  How should I use this medicine?  This medicine is injected into a muscle or infused it into a vein. It is usually given in a medical office or clinic. If you are to give this medicine you will be taught how to inject it. Follow instructions carefully. Use your doses at regular intervals. Do not take your medicine more often than directed. Do not skip doses or stop your medicine early even if you feel better. Do not stop taking except on your doctor's advice.  Talk to your pediatrician regarding the use of this medicine in children. Special care may be needed.  Overdosage: If you think you have taken too much of this medicine contact a poison control center or emergency room at once.  NOTE: This medicine is only for you. Do not share this medicine with others.  What if I miss a dose?  If you miss a dose, take it as soon as you can. If it is almost time for your next dose, take only that dose. Do not take double or extra doses.  What may interact with this medicine?  Do not take this medicine with any of the following medications:  -intravenous calcium  This medicine may also interact with the following medications:  -birth control pills  This list may not describe all possible interactions. Give your health care provider a list of all the medicines, herbs, non-prescription drugs, or dietary supplements you use. Also tell them if you smoke, drink alcohol, or use illegal drugs. Some items may interact with your medicine.  What should I watch for while using this medicine?  Tell your doctor or health care professional if your symptoms do not improve or  if they get worse.  Do not treat diarrhea with over the counter products. Contact your doctor if you have diarrhea that lasts more than 2 days or if it is severe and watery.  If you are being treated for a sexually transmitted disease, avoid sexual contact until you have finished your treatment. Having sex can infect your sexual partner.  Calcium may bind to this medicine and cause lung or kidney problems. Avoid calcium products while taking this medicine and for 48 hours after taking the last dose of this medicine.  What side effects may I notice from receiving this medicine?  Side effects that you should report to your doctor or health care professional as soon as possible:  -allergic reactions like skin rash, itching or hives, swelling of the face, lips, or tongue  -breathing problems  -fever, chills  -irregular heartbeat  -pain when passing urine  -seizures  -stomach pain, cramps  -unusual bleeding, bruising  -unusually weak or tired  Side effects that usually do not require medical attention (report to your doctor or health care professional if they continue or are bothersome):  -diarrhea  -dizzy, drowsy  -headache  -nausea, vomiting  -pain, swelling, irritation where injected  -stomach upset  -sweating  This list may not describe all possible side effects. Call your doctor for medical advice about side effects. You may report side effects to FDA at 9-718-FDA-0645.  Where should I keep my medicine?  Keep out of the reach of children.  Store at room temperature below 25 degrees C (77 degrees F). Protect from light. Throw away any unused vials after the expiration date.  NOTE: This sheet is a summary. It may not cover all possible information. If you have questions about this medicine, talk to your doctor, pharmacist, or health care provider.     © 2016, [employer]. (2015-07-06 09:14:54)          Daptomycin injection  What is this medicine?  DAPTOMYCIN (DAP toe MYE sin) is a lipopeptide antibiotic. It  is used to treat certain kinds of bacterial infections. It will not work for colds, flu, or other viral infections.  This medicine may be used for other purposes; ask your health care provider or pharmacist if you have questions.  What should I tell my health care provider before I take this medicine?  They need to know if you have any of these conditions:  -kidney disease  -an unusual or allergic reaction to daptomycin, other medicines, foods, dyes, or preservatives  -pregnant or trying to get pregnant  -breast-feeding  How should I use this medicine?  This medicine is for infusion into a vein. It is usually given by a health care professional in a hospital or clinic setting.  If you get this medicine at home, you will be taught how to prepare and give this medicine. Use exactly as directed. Take your medicine at regular intervals. Do not take your medicine more often than directed. Take all of your medicine as directed even if you think you are better. Do not skip doses or stop your medicine early.  It is important that you put your used needles and syringes in a special sharps container. Do not put them in a trash can. If you do not have a sharps container, call your pharmacist or healthcare provider to get one.  Talk to your pediatrician regarding the use of this medicine in children. Special care may be needed.  Overdosage: If you think you have taken too much of this medicine contact a poison control center or emergency room at once.  NOTE: This medicine is only for you. Do not share this medicine with others.  What if I miss a dose?  If you miss a dose, take it as soon as you can. If it is almost time for your next dose, take only that dose. Do not take double or extra doses.  What may interact with this medicine?  -birth control pills  -some antibiotics like tobramycin  This list may not describe all possible interactions. Give your health care provider a list of all the medicines, herbs, non-prescription  drugs, or dietary supplements you use. Also tell them if you smoke, drink alcohol, or use illegal drugs. Some items may interact with your medicine.  What should I watch for while using this medicine?  Your condition will be monitored carefully while you are receiving this medicine.  Do not treat diarrhea with over the counter products. Contact your doctor if you have diarrhea that lasts more than 2 days or if it is severe and watery.  What side effects may I notice from receiving this medicine?  Side effects that you should report to your doctor or health care professional as soon as possible:  -allergic reactions like skin rash, itching or hives, swelling of the face, lips, or tongue  -breathing problems  -fever, infection  -high or low blood pressure  -muscle pain  -numb or tingling pain  -trouble passing urine or change in the amount of urine  -unusually tired or weak  -vomiting  Side effects that usually do not require medical attention (report to your doctor or health care professional if they continue or are bothersome):  -constipation or diarrhea  -trouble sleeping  -headache  -nausea  -stomach upset  This list may not describe all possible side effects. Call your doctor for medical advice about side effects. You may report side effects to FDA at 9-186-FDA-6591.  Where should I keep my medicine?  Keep out of the reach of children.  If you are using this medicine at home, you will be instructed on how to store this medicine. Throw away any unused medicine after the expiration date on the label.  NOTE: This sheet is a summary. It may not cover all possible information. If you have questions about this medicine, talk to your doctor, pharmacist, or health care provider.     © 2016, [employer]. (2014-07-25 07:33:48)          Enoxaparin injection  What is this medicine?  ENOXAPARIN (ee nox a PA rin) is used after knee, hip, or abdominal surgeries to prevent blood clotting. It is also used to treat  existing blood clots in the lungs or in the veins.  This medicine may be used for other purposes; ask your health care provider or pharmacist if you have questions.  What should I tell my health care provider before I take this medicine?  They need to know if you have any of these conditions:  -bleeding disorders, hemorrhage, or hemophilia  -infection of the heart or heart valves  -kidney or liver disease  -previous stroke  -prosthetic heart valve  -recent surgery or delivery of a baby  -ulcer in the stomach or intestine, diverticulitis, or other bowel disease  -an unusual or allergic reaction to enoxaparin, heparin, pork or pork products, other medicines, foods, dyes, or preservatives  -pregnant or trying to get pregnant  -breast-feeding  How should I use this medicine?  This medicine is for injection under the skin. It is usually given by a health-care professional. You or a family member may be trained on how to give the injections. If you are to give yourself injections, make sure you understand how to use the syringe, measure the dose if necessary, and give the injection. To avoid bruising, do not rub the site where this medicine has been injected. Do not take your medicine more often than directed. Do not stop taking except on the advice of your doctor or health care professional.  Make sure you receive a puncture-resistant container to dispose of the needles and syringes once you have finished with them. Do not reuse these items. Return the container to your doctor or health care professional for proper disposal.  Talk to your pediatrician regarding the use of this medicine in children. Special care may be needed.  Overdosage: If you think you have taken too much of this medicine contact a poison control center or emergency room at once.  NOTE: This medicine is only for you. Do not share this medicine with others.  What if I miss a dose?  If you miss a dose, take it as soon as you can. If it is almost time  for your next dose, take only that dose. Do not take double or extra doses.  What may interact with this medicine?  -aspirin and aspirin-like medicines  -certain medicines that treat or prevent blood clots  -dipyridamole  -NSAIDs, medicines for pain and inflammation, like ibuprofen or naproxen  This list may not describe all possible interactions. Give your health care provider a list of all the medicines, herbs, non-prescription drugs, or dietary supplements you use. Also tell them if you smoke, drink alcohol, or use illegal drugs. Some items may interact with your medicine.  What should I watch for while using this medicine?  Visit your doctor or health care professional for regular checks on your progress. Your condition will be monitored carefully while you are receiving this medicine.  Notify your doctor or health care professional and seek emergency treatment if you develop breathing problems; changes in vision; chest pain; severe, sudden headache; pain, swelling, warmth in the leg; trouble speaking; sudden numbness or weakness of the face, arm, or leg. These can be signs that your condition has gotten worse.  If you are going to have surgery, tell your doctor or health care professional that you are taking this medicine.  Do not stop taking this medicine without first talking to your doctor. Be sure to refill your prescription before you run out of medicine.  Avoid sports and activities that might cause injury while you are using this medicine. Severe falls or injuries can cause unseen bleeding. Be careful when using sharp tools or knives. Consider using an electric razor. Take special care brushing or flossing your teeth. Report any injuries, bruising, or red spots on the skin to your doctor or health care professional.  What side effects may I notice from receiving this medicine?  Side effects that you should report to your doctor or health care professional as soon as possible:  -allergic reactions like  skin rash, itching or hives, swelling of the face, lips, or tongue  -feeling faint or lightheaded, falls  -signs and symptoms of bleeding such as bloody or black, tarry stools; red or dark-brown urine; spitting up blood or brown material that looks like coffee grounds; red spots on the skin; unusual bruising or bleeding from the eye, gums, or nose  Side effects that usually do not require medical attention (report to your doctor or health care professional if they continue or are bothersome):  -pain, redness, or irritation at site where injected  This list may not describe all possible side effects. Call your doctor for medical advice about side effects. You may report side effects to FDA at 1-997-FDA-7088.  Where should I keep my medicine?  Keep out of the reach of children.  Store at room temperature between 15 and 30 degrees C (59 and 86 degrees F). Do not freeze. If your injections have been specially prepared, you may need to store them in the refrigerator. Ask your pharmacist. Throw away any unused medicine after the expiration date.  NOTE: This sheet is a summary. It may not cover all possible information. If you have questions about this medicine, talk to your doctor, pharmacist, or health care provider.     © 2016, [employer]. (2015-04-21 16:06:21)          Aspirin, ASA oral tablets  What is this medicine?  ASPIRIN (AS pir in) is a pain reliever. It is used to treat mild pain and fever. This medicine is also used as directed by a doctor to prevent and to treat heart attacks, to prevent strokes, and to treat arthritis or inflammation.  This medicine may be used for other purposes; ask your health care provider or pharmacist if you have questions.  What should I tell my health care provider before I take this medicine?  They need to know if you have any of these conditions:  -anemia  -asthma  -bleeding problems  -child with chickenpox, the flu, or other viral infection  -diabetes  -gout  -if you  frequently drink alcohol containing drinks  -kidney disease  -liver disease  -low level of vitamin K  -lupus  -smoke tobacco  -stomach ulcers or other problems  -an unusual or allergic reaction to aspirin, tartrazine dye, other medicines, dyes, or preservatives  -pregnant or trying to get pregnant  -breast-feeding  How should I use this medicine?  Take this medicine by mouth with a glass of water. Follow the directions on the package or prescription label. You can take this medicine with or without food. If it upsets your stomach, take it with food. Do not take your medicine more often than directed.  Talk to your pediatrician regarding the use of this medicine in children. While this drug may be prescribed for children as young as 12 years of age for selected conditions, precautions do apply. Children and teenagers should not use this medicine to treat chicken pox or flu symptoms unless directed by a doctor.  Patients over 65 years old may have a stronger reaction and need a smaller dose.  Overdosage: If you think you have taken too much of this medicine contact a poison control center or emergency room at once.  NOTE: This medicine is only for you. Do not share this medicine with others.  What if I miss a dose?  If you are taking this medicine on a regular schedule and miss a dose, take it as soon as you can. If it is almost time for your next dose, take only that dose. Do not take double or extra doses.  What may interact with this medicine?  Do not take this medicine with any of the following medications:  -cidofovir  -ketorolac  -probenecid  This medicine may also interact with the following medications:  -alcohol  -alendronate  -bismuth subsalicylate  -flavocoxid  -herbal supplements like feverfew, garlic, flakita, ginkgo biloba, horse chestnut  -medicines for diabetes or glaucoma like acetazolamide, methazolamide  -medicines for gout  -medicines that treat or prevent blood clots like enoxaparin, heparin,  ticlopidine, warfarin  -other aspirin and aspirin-like medicines  -NSAIDs, medicines for pain and inflammation, like ibuprofen or naproxen  -pemetrexed  -sulfinpyrazone  -varicella live vaccine  This list may not describe all possible interactions. Give your health care provider a list of all the medicines, herbs, non-prescription drugs, or dietary supplements you use. Also tell them if you smoke, drink alcohol, or use illegal drugs. Some items may interact with your medicine.  What should I watch for while using this medicine?  If you are treating yourself for pain, tell your doctor or health care professional if the pain lasts more than 10 days, if it gets worse, or if there is a new or different kind of pain. Tell your doctor if you see redness or swelling. Also, check with your doctor if you have a fever that lasts for more than 3 days. Only take this medicine to prevent heart attacks or blood clotting if prescribed by your doctor or health care professional.  Do not take aspirin or aspirin-like medicines with this medicine. Too much aspirin can be dangerous. Always read the labels carefully.  This medicine can irritate your stomach or cause bleeding problems. Do not smoke cigarettes or drink alcohol while taking this medicine. Do not lie down for 30 minutes after taking this medicine to prevent irritation to your throat.  If you are scheduled for any medical or dental procedure, tell your healthcare provider that you are taking this medicine. You may need to stop taking this medicine before the procedure.  This medicine may be used to treat migraines. If you take migraine medicines for 10 or more days a month, your migraines may get worse. Keep a diary of headache days and medicine use. Contact your healthcare professional if your migraine attacks occur more frequently.  What side effects may I notice from receiving this medicine?  Side effects that you should report to your doctor or health care professional  as soon as possible:  -allergic reactions like skin rash, itching or hives, swelling of the face, lips, or tongue  -breathing problems  -changes in hearing, ringing in the ears  -confusion  -general ill feeling or flu-like symptoms  -pain on swallowing  -redness, blistering, peeling or loosening of the skin, including inside the mouth or nose  -signs and symptoms of bleeding such as bloody or black, tarry stools; red or dark-brown urine; spitting up blood or brown material that looks like coffee grounds; red spots on the skin; unusual bruising or bleeding from the eye, gums, or nose  -trouble passing urine or change in the amount of urine  -unusually weak or tired  -yellowing of the eyes or skin  Side effects that usually do not require medical attention (report to your doctor or health care professional if they continue or are bothersome):  -diarrhea or constipation  -headache  -nausea, vomiting  -stomach gas, heartburn  This list may not describe all possible side effects. Call your doctor for medical advice about side effects. You may report side effects to FDA at 5-036-FDA-1872.  Where should I keep my medicine?  Keep out of the reach of children.  Store at room temperature between 15 and 30 degrees C (59 and 86 degrees F). Protect from heat and moisture. Do not use this medicine if it has a strong vinegar smell. Throw away any unused medicine after the expiration date.  NOTE: This sheet is a summary. It may not cover all possible information. If you have questions about this medicine, talk to your doctor, pharmacist, or health care provider.     © 2016, [employer]. (2014-08-19 11:30:31)          Nicotine skin patches  What is this medicine?  NICOTINE (LEONOR oh teen) helps people stop smoking. The patches replace the nicotine found in cigarettes and help to decrease withdrawal effects. They are most effective when used in combination with a stop-smoking program.  This medicine may be used for other  purposes; ask your health care provider or pharmacist if you have questions.  What should I tell my health care provider before I take this medicine?  They need to know if you have any of these conditions:  -diabetes  -heart disease, angina, irregular heartbeat or previous heart attack  -high blood pressure  -lung disease, including asthma  -overactive thyroid  -pheochromocytoma  -seizures or a history of seizures  -skin problems, like eczema  -stomach problems or ulcers  -an unusual or allergic reaction to nicotine, adhesives, other medicines, foods, dyes, or preservatives  -pregnant or trying to get pregnant  -breast-feeding  How should I use this medicine?  This medicine is for use on the skin. Follow the directions that come with the patches. Find an area of skin on your upper arm, chest, or back that is clean, dry, greaseless, undamaged and hairless. Wash hands with plain soap and water. Do not use anything that contains aloe, lanolin or glycerin as these may prevent the patch from sticking. Dry thoroughly. Remove the patch from the sealed pouch. Do not try to cut or trim the patch. Using your palm, press the patch firmly in place for 10 seconds to make sure that there is good contact with your skin. After applying the patch, wash your hands. Change the patch every day, keeping to a regular schedule. When you apply a new patch, use a new area of skin. Wait at least 1 week before using the same area again.  Talk to your pediatrician regarding the use of this medicine in children. Special care may be needed.  Overdosage: If you think you have taken too much of this medicine contact a poison control center or emergency room at once.  NOTE: This medicine is only for you. Do not share this medicine with others.  What if I miss a dose?  If you forget to replace a patch, use it as soon as you can. Only use one patch at a time and do not leave on the skin for longer than directed. If a patch falls off, you can replace  it, but keep to your schedule and remove the patch at the right time.  What may interact with this medicine?  -medicines for asthma  -medicines for blood pressure  -medicines for mental depression  This list may not describe all possible interactions. Give your health care provider a list of all the medicines, herbs, non-prescription drugs, or dietary supplements you use. Also tell them if you smoke, drink alcohol, or use illegal drugs. Some items may interact with your medicine.  What should I watch for while using this medicine?  You should begin using the nicotine patch the day you stop smoking. It is okay if you do not succeed at your attempt to quit and have a cigarette. You can still continue your quit attempt and keep using the product as directed. Just throw away your cigarettes and get back to your quit plan.  You can keep the patch in place during swimming, bathing, and showering. If your patch falls off during these activities, replace it.  When you first apply the patch, your skin may itch or burn. This should go away soon. When you remove a patch, the skin may look red, but this should only last for a few days. Call your doctor or health care professional if skin redness does not go away after 4 days, if your skin swells, or if you get a rash.  If you are a diabetic and you quit smoking, the effects of insulin may be increased and you may need to reduce your insulin dose. Check with your doctor or health care professional about how you should adjust your insulin dose.  If you are going to have a magnetic resonance imaging (MRI) procedure, tell your MRI technician if you have this patch on your body. It must be removed before a MRI.  What side effects may I notice from receiving this medicine?  Side effects that you should report to your doctor or health care professional as soon as possible:  -allergic reactions like skin rash, itching or hives, swelling of the face, lips, or tongue  -breathing  problems  -changes in hearing  -changes in vision  -chest pain  -cold sweats  -confusion  -fast, irregular heartbeat  -feeling faint or lightheaded, falls  -headache  -increased saliva  -skin redness that lasts more than 4 days  -stomach pain  -signs and symptoms of nicotine overdose like nausea; vomiting; dizziness; weakness; and rapid heartbeat  Side effects that usually do not require medical attention (report to your doctor or health care professional if they continue or are bothersome):  -diarrhea  -dry mouth  -hiccups  -irritability  -nervousness or restlessness  -trouble sleeping or vivid dreams  This list may not describe all possible side effects. Call your doctor for medical advice about side effects. You may report side effects to FDA at 0-055-FDA-0867.  Where should I keep my medicine?  Keep out of the reach of children.  Store at room temperature between 20 and 25 degrees C (68 and 77 degrees F). Protect from heat and light. Store in manufacturers packaging until ready to use. Throw away unused medicine after the expiration date. When you remove a patch, fold with sticky sides together; put in an empty opened pouch and throw away.  NOTE: This sheet is a summary. It may not cover all possible information. If you have questions about this medicine, talk to your doctor, pharmacist, or health care provider.     © 2016, [employer]. (2015-11-16 15:46:21)         PREVENTING SURGICAL SITE INFECTIONS     Surgical Site Infections FAQs  What is a Surgical Site Infection (SSI)?  A surgical site infection is an infection that occurs after surgery in the part of the body where the surgery took place. Most patients who have surgery do not develop an infection. However, infections develop in about 1 to 3 out of every 100 patients who have surgery.  Some of the common symptoms of a surgical site infection are:  · Redness and pain around the area where you had surgery  · Drainage of cloudy fluid from your  surgical wound  · Fever  Can SSIs be treated?  Yes. Most surgical site infections can be treated with antibiotics. The antibiotic given to you depends on the bacteria (germs) causing the infection. Sometimes patients with SSIs also need another surgery to treat the infection.  What are some of the things that hospitals are doing to prevent SSIs?  To prevent SSIs, doctors, nurses, and other healthcare providers:  · Clean their hands and arms up to their elbows with an antiseptic agent just before the surgery.  · Clean their hands with soap and water or an alcohol-based hand rub before and after caring for each patient.  · May remove some of your hair immediately before your surgery using electric clippers if the hair is in the same area where the procedure will occur. They should not shave you with a razor.  · Wear special hair covers, masks, gowns, and gloves during surgery to keep the surgery area clean.  · Give you antibiotics before your surgery starts. In most cases, you should get antibiotics within 60 minutes before the surgery starts and the antibiotics should be stopped within 24 hours after surgery.  · Clean the skin at the site of your surgery with a special soap that kills germs.  What can I do to help prevent SSIs?  Before your surgery:  · Tell your doctor about other medical problems you may have. Health problems such as allergies, diabetes, and obesity could affect your surgery and your treatment.  · Quit smoking. Patients who smoke get more infections. Talk to your doctor about how you can quit before your surgery.  · Do not shave near where you will have surgery. Shaving with a razor can irritate your skin and make it easier to develop an infection.  At the time of your surgery:  · Speak up if someone tries to shave you with a razor before surgery. Ask why you need to be shaved and talk with your surgeon if you have any concerns.  · Ask if you will get antibiotics before surgery.  After your  surgery:  · Make sure that your healthcare providers clean their hands before examining you, either with soap and water or an alcohol-based hand rub.    If you do not see your providers clean their hands, please ask them to do so.  · Family and friends who visit you should not touch the surgical wound or dressings.  · Family and friends should clean their hands with soap and water or an alcohol-based hand rub before and after visiting you. If you do not see them clean their hands, ask them to clean their hands.  What do I need to do when I go home from the hospital?  · Before you go home, your doctor or nurse should explain everything you need to know about taking care of your wound. Make sure you understand how to care for your wound before you leave the hospital.  · Always clean your hands before and after caring for your wound.  · Before you go home, make sure you know who to contact if you have questions or problems after you get home.  · If you have any symptoms of an infection, such as redness and pain at the surgery site, drainage, or fever, call your doctor immediately.  If you have additional questions, please ask your doctor or nurse.  Developed and co-sponsored by The Society for Healthcare Epidemiology of [place] (SHEA); Infectious Diseases Society of [place] (IDSA); American Hospital Association; Association for Professionals in Infection Control and Epidemiology (APIC); Centers for Disease Control and Prevention (CDC); and The Joint Commission.     This information is not intended to replace advice given to you by your health care provider. Make sure you discuss any questions you have with your health care provider.     Document Released: 12/23/2014 Document Revised: 01/08/2016 Document Reviewed: 03/02/2016  [employer] Patient Education ©2016 [employer]             SYMPTOMS OF A STROKE    Call 911 or have someone take you to the Emergency Department if you have any of the  following:    · Sudden numbness or weakness of your face, arm or leg especially on one side of the body  · Sudden confusion, diffiiculty speaking or trouble understanding   · Changes in your vision or loss of sight in one eye  · Sudden severe headache with no known cause  · sudden dizziness, trouble walking, loss of balance or coordination    It is important to seek emergency care right away if you have further stroke symptoms. If you get emergency help quickly, the powerful clot-dissolving medicines can reduce the disabilities caused by a stroke.     For more information:    American Stroke Association  4-141-4-STROKE  www.strokeassociation.org           IF YOU SMOKE OR USE TOBACCO PLEASE READ THE FOLLOWING:    Why is smoking bad for me?  Smoking increases the risk of heart disease, lung disease, vascular disease, stroke, and cancer.     If you smoke, STOP!    If you would like more information on quitting smoking, please visit the [employer] website: [email or web address]   This link will provide additional resources including the QUIT line and the [employer] support groups.     For more information:    American Cancer Society  ([phone]    American Heart Association  1-132.118.1983               YOU ARE THE MOST IMPORTANT FACTOR IN YOUR RECOVERY.     Follow all instructions carefully.     I have reviewed my discharge instructions with my nurse, including the following information, if applicable:     Information about my illness and diagnosis   Follow up appointments (including lab draws)   Wound Care   Equipment Needs   Medications (new and continuing) along with side effects   Preventative information such as vaccines and smoking cessations   Diet   Pain   I know when to contact my Doctor's office or seek emergency care      I want my nurse to describe the side effects of my medications: YES NO   If the answer is no, I understand the side effects of my  medications: YES NO   My nurse described the side effects of my medications in a way that I could understand: YES NO   I have taken my personal belongings and my own medications with me at discharge: YES NO            I have received this information and my questions have been answered. I have discussed any concerns I see with this plan with the nurse or physician. I understand these instructions.    Signature of Patient or Responsible Person: _____________________________________    Date: _________________  Time: __________________    Signature of Healthcare Provider: _______________________________________  Date: _________________  Time: __________________

## 2017-01-06 PROBLEM — Z98.890 S/P DEBRIDEMENT: Status: ACTIVE | Noted: 2017-01-06

## 2017-01-06 LAB
ALBUMIN SERPL-MCNC: 3.5 G/DL (ref 3.2–4.8)
ALBUMIN/GLOB SERPL: 1.5 G/DL (ref 1.5–2.5)
ALP SERPL-CCNC: 66 U/L (ref 25–100)
ALT SERPL W P-5'-P-CCNC: 14 U/L (ref 7–40)
ANION GAP SERPL CALCULATED.3IONS-SCNC: 5 MMOL/L (ref 3–11)
AST SERPL-CCNC: 18 U/L (ref 0–33)
BASOPHILS # BLD AUTO: 0 10*3/MM3 (ref 0–0.2)
BASOPHILS NFR BLD AUTO: 0 % (ref 0–1)
BILIRUB SERPL-MCNC: 0.2 MG/DL (ref 0.3–1.2)
BUN BLD-MCNC: 7 MG/DL (ref 9–23)
BUN/CREAT SERPL: 11.7 (ref 7–25)
CALCIUM SPEC-SCNC: 9.2 MG/DL (ref 8.7–10.4)
CHLORIDE SERPL-SCNC: 105 MMOL/L (ref 99–109)
CK SERPL-CCNC: 60 U/L (ref 26–174)
CO2 SERPL-SCNC: 25 MMOL/L (ref 20–31)
CREAT BLD-MCNC: 0.6 MG/DL (ref 0.6–1.3)
CYTO UR: NORMAL
DEPRECATED RDW RBC AUTO: 41.2 FL (ref 37–54)
EOSINOPHIL # BLD AUTO: 0 10*3/MM3 (ref 0.1–0.3)
EOSINOPHIL NFR BLD AUTO: 0 % (ref 0–3)
ERYTHROCYTE [DISTWIDTH] IN BLOOD BY AUTOMATED COUNT: 12.9 % (ref 11.3–14.5)
ERYTHROCYTE [SEDIMENTATION RATE] IN BLOOD: 11 MM/HR (ref 0–30)
GFR SERPL CREATININE-BSD FRML MDRD: 102 ML/MIN/1.73
GLOBULIN UR ELPH-MCNC: 2.3 GM/DL
GLUCOSE BLD-MCNC: 148 MG/DL (ref 70–100)
HCT VFR BLD AUTO: 34.5 % (ref 34.5–44)
HGB BLD-MCNC: 11.6 G/DL (ref 11.5–15.5)
IMM GRANULOCYTES # BLD: 0.03 10*3/MM3 (ref 0–0.03)
IMM GRANULOCYTES NFR BLD: 0.3 % (ref 0–0.6)
LAB AP CASE REPORT: NORMAL
LAB AP CLINICAL INFORMATION: NORMAL
LYMPHOCYTES # BLD AUTO: 1.21 10*3/MM3 (ref 0.6–4.8)
LYMPHOCYTES NFR BLD AUTO: 12.4 % (ref 24–44)
Lab: NORMAL
MCH RBC QN AUTO: 29.8 PG (ref 27–31)
MCHC RBC AUTO-ENTMCNC: 33.6 G/DL (ref 32–36)
MCV RBC AUTO: 88.7 FL (ref 80–99)
MONOCYTES # BLD AUTO: 0.41 10*3/MM3 (ref 0–1)
MONOCYTES NFR BLD AUTO: 4.2 % (ref 0–12)
NEUTROPHILS # BLD AUTO: 8.11 10*3/MM3 (ref 1.5–8.3)
NEUTROPHILS NFR BLD AUTO: 83.1 % (ref 41–71)
NRBC BLD MANUAL-RTO: 0 /100 WBC (ref 0–0)
PATH REPORT.FINAL DX SPEC: NORMAL
PATH REPORT.GROSS SPEC: NORMAL
PLATELET # BLD AUTO: 210 10*3/MM3 (ref 150–450)
PMV BLD AUTO: 10 FL (ref 6–12)
POTASSIUM BLD-SCNC: 4.5 MMOL/L (ref 3.5–5.5)
PROT SERPL-MCNC: 5.8 G/DL (ref 5.7–8.2)
RBC # BLD AUTO: 3.89 10*6/MM3 (ref 3.89–5.14)
SODIUM BLD-SCNC: 135 MMOL/L (ref 132–146)
WBC NRBC COR # BLD: 9.76 10*3/MM3 (ref 3.5–10.8)

## 2017-01-06 PROCEDURE — 82550 ASSAY OF CK (CPK): CPT | Performed by: INTERNAL MEDICINE

## 2017-01-06 PROCEDURE — 25010000002 VANCOMYCIN PER 500 MG

## 2017-01-06 PROCEDURE — 25010000002 DAPTOMYCIN PER 1 MG: Performed by: INTERNAL MEDICINE

## 2017-01-06 PROCEDURE — 25010000002 HYDROMORPHONE PER 4 MG: Performed by: ORTHOPAEDIC SURGERY

## 2017-01-06 PROCEDURE — 80053 COMPREHEN METABOLIC PANEL: CPT | Performed by: INTERNAL MEDICINE

## 2017-01-06 PROCEDURE — 97605 NEG PRS WND THER DME<=50SQCM: CPT

## 2017-01-06 PROCEDURE — 4A02X4A MEASUREMENT OF CARDIAC ELECTRICAL ACTIVITY, GUIDANCE, EXTERNAL APPROACH: ICD-10-PCS | Performed by: INTERNAL MEDICINE

## 2017-01-06 PROCEDURE — 85025 COMPLETE CBC W/AUTO DIFF WBC: CPT | Performed by: INTERNAL MEDICINE

## 2017-01-06 PROCEDURE — 85652 RBC SED RATE AUTOMATED: CPT | Performed by: INTERNAL MEDICINE

## 2017-01-06 PROCEDURE — 25010000002 ENOXAPARIN PER 10 MG: Performed by: ORTHOPAEDIC SURGERY

## 2017-01-06 PROCEDURE — 97110 THERAPEUTIC EXERCISES: CPT

## 2017-01-06 PROCEDURE — 25010000003 CEFTRIAXONE PER 250 MG: Performed by: INTERNAL MEDICINE

## 2017-01-06 PROCEDURE — 02HV33Z INSERTION OF INFUSION DEVICE INTO SUPERIOR VENA CAVA, PERCUTANEOUS APPROACH: ICD-10-PCS | Performed by: INTERNAL MEDICINE

## 2017-01-06 RX ORDER — VANCOMYCIN HYDROCHLORIDE 1 G/200ML
15 INJECTION, SOLUTION INTRAVENOUS EVERY 12 HOURS SCHEDULED
Status: DISCONTINUED | OUTPATIENT
Start: 2017-01-06 | End: 2017-01-06

## 2017-01-06 RX ADMIN — POTASSIUM CHLORIDE, DEXTROSE MONOHYDRATE AND SODIUM CHLORIDE 100 ML/HR: 150; 5; 450 INJECTION, SOLUTION INTRAVENOUS at 03:13

## 2017-01-06 RX ADMIN — OXYCODONE AND ACETAMINOPHEN 2 TABLET: 5; 325 TABLET ORAL at 17:37

## 2017-01-06 RX ADMIN — Medication 250 MG: at 17:36

## 2017-01-06 RX ADMIN — DAPTOMYCIN 500 MG: 500 INJECTION, POWDER, LYOPHILIZED, FOR SOLUTION INTRAVENOUS at 21:13

## 2017-01-06 RX ADMIN — DOCUSATE SODIUM AND SENNOSIDES 2 TABLET: 8.6; 5 TABLET, FILM COATED ORAL at 09:26

## 2017-01-06 RX ADMIN — HYDROCODONE BITARTRATE AND ACETAMINOPHEN 1 TABLET: 7.5; 325 TABLET ORAL at 03:13

## 2017-01-06 RX ADMIN — VANCOMYCIN HYDROCHLORIDE 1000 MG: 1 INJECTION, SOLUTION INTRAVENOUS at 09:25

## 2017-01-06 RX ADMIN — Medication 250 MG: at 09:25

## 2017-01-06 RX ADMIN — GABAPENTIN 400 MG: 400 CAPSULE ORAL at 06:24

## 2017-01-06 RX ADMIN — ENOXAPARIN SODIUM 40 MG: 40 INJECTION SUBCUTANEOUS at 09:26

## 2017-01-06 RX ADMIN — OXYCODONE AND ACETAMINOPHEN 2 TABLET: 5; 325 TABLET ORAL at 12:29

## 2017-01-06 RX ADMIN — ALPRAZOLAM 0.25 MG: 0.25 TABLET ORAL at 09:25

## 2017-01-06 RX ADMIN — HYDROMORPHONE HYDROCHLORIDE 0.5 MG: 1 INJECTION, SOLUTION INTRAMUSCULAR; INTRAVENOUS; SUBCUTANEOUS at 20:05

## 2017-01-06 RX ADMIN — DOCUSATE SODIUM AND SENNOSIDES 2 TABLET: 8.6; 5 TABLET, FILM COATED ORAL at 17:37

## 2017-01-06 RX ADMIN — HYDROCODONE BITARTRATE AND ACETAMINOPHEN 1 TABLET: 7.5; 325 TABLET ORAL at 09:25

## 2017-01-06 RX ADMIN — CEFTRIAXONE SODIUM 2 G: 2 INJECTION, SOLUTION INTRAVENOUS at 13:46

## 2017-01-06 RX ADMIN — HYDROMORPHONE HYDROCHLORIDE 0.5 MG: 1 INJECTION, SOLUTION INTRAMUSCULAR; INTRAVENOUS; SUBCUTANEOUS at 10:36

## 2017-01-06 RX ADMIN — GABAPENTIN 400 MG: 400 CAPSULE ORAL at 21:13

## 2017-01-06 RX ADMIN — GABAPENTIN 400 MG: 400 CAPSULE ORAL at 13:46

## 2017-01-06 RX ADMIN — OXYCODONE AND ACETAMINOPHEN 2 TABLET: 5; 325 TABLET ORAL at 21:13

## 2017-01-06 RX ADMIN — NICOTINE 1 PATCH: 21 PATCH, EXTENDED RELEASE TRANSDERMAL at 09:34

## 2017-01-06 NOTE — PROGRESS NOTES
Acute Care - Wound/Debridement Initial Evaluation  [facility]     Patient Name: Gloria Oropeza  : 1955  MRN: [id number]  Today's Date: 2017  Onset of Illness/Injury or Date of Surgery Date: 17  Date of Referral to PT: 17   Referring Physician: MD Jesus      Admit Date: 2017    Visit Dx:    ICD-10-CM ICD-9-CM   1. Impaired functional mobility, balance, gait, and endurance Z74.09 V49.89   2. Surgical wound breakdown, subsequent encounter T81.31XD V58.89     998.32   3. Open wound of ankle with complication, left, subsequent encounter S91.002D V58.89     891.1             Patient Active Problem List   Diagnosis   • Ankle fracture, right   • Closed right ankle fracture   • ORIF of fracture of ankle 10/20/2016   • Hyperlipidemia   • Tobacco abuse   • Surgical wound breakdown   • S/P right ankle hardware removal with irrigation and debridement of ankle wounds        Past Medical History   Diagnosis Date   • Arthritis    • Breast cancer    • Fracture      right ankle   • Hyperlipidemia    • Wears eyeglasses    • Wears partial dentures      upper        Past Surgical History   Procedure Laterality Date   • Breast lumpectomy Right    • Colonoscopy        approx   • Mouth surgery     • Ankle open reduction internal fixation Right 10/20/2016     Procedure: RIGHT ANKLE OPEN REDUCTION INTERNAL FIXATION;  Surgeon: Cristina Lee MD;  Location: [facility];  Service:    • Hardware removal Right 2017     Procedure: RIGHT ANKLE HARDWARE REMOVAL WITH IRRIGATION AND DEBRIDEMENT OF ANKLE WOUNDS;  Surgeon: Cristina Lee MD;  Location: [facility] OR;  Service:                LDA Wound       17 0815          [REMOVED] Incision 17 1504 Right ankle    Incision - Properties Group Placement Date: 17  -LD Placement Time:   -LD Side: Right  -LD Location: ankle  -LD Removal Date: 17  -MF Removal Time: 815  -MF    [REMOVED] Incision 10/20/16 1507 Right foot    Incision -  Properties Group Placement Date: 10/20/16  -JA Placement Time: 1507  -JA Side: Right  -JA Location: foot  -JA Removal Date: 01/06/17  -MF Removal Time: 0815  -MF    Wound 01/06/17 0815 Right lateral ankle other (see comments)    Wound - Properties Group Date first assessed: 01/06/17  -MF Time first assessed: 0815  -MF Side: Right  -MF Orientation: lateral  -MF Location: ankle  -MF Type: other (see comments)  -MF, surgical     Dressing Appearance intact  -MF      Base yellow;moist;clean  -MF      Periwound Area intact;pink;dry  -MF      Edges open  -MF      Length (cm) 8.5  -MF      Width (cm) 1.5  -MF      Depth (cm) 1  -MF      Drainage Characteristics/Odor sanguineous  -MF      Drainage Amount small  -MF      Picture taken yes  -MF      Wound Cleaning irrigated with;sterile normal saline  -MF      Therapy Setting (Negative Pressure Wound Therapy) continuous therapy  -MF      Pressure Setting (Negative Pressure Wound Therapy) 125 mmHg  -MF      Dressing (Negative Pressure Wound Therapy) foam, black;gauze, nonadherent  -MF      Sponges Inserted (Negative Pressure Wound Therapy) 3   1 black to bridge wounds a and b and 2 adaptic to cover lateral sutures  -MF      Sponges Removed (Negative Pressure Wound Therapy) 1   gauze  -MF      Output (mL) 0  -MF      Wound 01/06/17 0815 Right medial ankle other (see comments)    Wound - Properties Group Date first assessed: 01/06/17  -MF Time first assessed: 0815  -MF Side: Right  -MF Orientation: medial  -MF Location: ankle  -MF Type: other (see comments)  -MF, surgical     Dressing Appearance intact  -MF      Base yellow;moist;pink  -MF      Periwound Area intact;pink;dry  -MF      Length (cm) 3.2  -MF      Width (cm) 1.5  -MF      Depth (cm) 0.5  -MF      Drainage Characteristics/Odor serosanguineous  -MF      Drainage Amount small  -MF      Picture taken yes  -MF      Wound Cleaning irrigated with;sterile normal saline  -MF      Therapy Setting (Negative Pressure Wound  Therapy) continuous therapy  -      Pressure Setting (Negative Pressure Wound Therapy) 125 mmHg  -      Dressing (Negative Pressure Wound Therapy) foam, black  -      Sponges Inserted (Negative Pressure Wound Therapy) 1   connecting piece to wound a only  -      Sponges Removed (Negative Pressure Wound Therapy) 1   gauze  -        User Key  (r) = Recorded By, (t) = Taken By, (c) = Cosigned By    Initials Name Provider Type     Willard Mcdonald, PT Physical Therapist    KAROL Zafar RN Registered Nurse    BAM Park, HENOK Registered Nurse          Finger sweep and visual inspection performed. Empty wound bed confirmed.  External label applied and verified.         PT ASSESSMENT (last 72 hours)      PT Evaluation       01/06/17 0815 01/06/17 0316    Rehab Evaluation    Document Type therapy note (daily note)   wound care eval  -     Subjective Information agree to therapy;complains of;pain  -     Living Environment    Transportation Available  car;family or friend will provide  -BS    Clinical Impression    Date of Referral to PT 01/05/17  -     PT Diagnosis open wound to R ankle  -     Rehab Potential fair, will monitor progress closely  -     Pain Assessment    Pain Assessment Rivera-Baker FACES  -     Rivera-Baker FACES Pain Rating 4  -     Pain Type Acute pain  -     Pain Location --   wound  -     Pain Intervention(s) Repositioned  -     Positioning and Restraints    Pre-Treatment Position in bed  -     Post Treatment Position bed  -MF     In Bed supine;RLE elevated  -       01/05/17 1633 01/05/17 1249    Rehab Evaluation    Document Type evaluation  -     Subjective Information agree to therapy;no complaints  -MJ     Patient Effort, Rehab Treatment good  -MJ     Symptoms Noted During/After Treatment none  -MJ     General Information    Patient Profile Review yes  -MJ     Onset of Illness/Injury or Date of Surgery Date 01/05/17  -     Referring Physician  MD Jesus  -     General Observations Pt supine in bed, IV, ace bandage/splint to R ankle. Friend present  -     Pertinent History Of Current Problem Pt presents for surgical removal of wound debridement of R ankle. Pt underwent R ankle ORIF 10/2016 after sustaining trimalleolar fx from jumping off a four garvey  -     Precautions/Limitations fall precautions;non-weight bearing status   NWB R LE  -MJ     Prior Level of Function min assist:;all household mobility;community mobility;gait;independent:;transfer;bed mobility   Pt has been using rollator and NWB since sx, indep prior to  -MJ     Equipment Currently Used at Home other (see comments)   rollator  -MJ walker, standard  -MB    Plans/Goals Discussed With patient;agreed upon  -     Risks Reviewed patient:;LOB;increased discomfort  -     Benefits Reviewed patient:;improve function;increase independence;increase strength;increase balance;decrease pain  -     Barriers to Rehab none identified  -     Living Environment    Lives With alone  - alone  -MB    Living Arrangements house  - house  -MB    Home Accessibility stairs within home;tub/shower is not walk in  -MJ bed and bath on same level;no concerns  -MB    Number of Stairs Within Home 1  -MJ     Stair Railings at Home  none  -MB    Type of Financial/Environmental Concern  none  -MB    Transportation Available  car  -MB    Clinical Impression    Date of Referral to PT 01/05/17  -MJ     PT Diagnosis s/p R ankle hardware removal, I&D  -MJ     Criteria for Skilled Therapeutic Interventions Met yes;treatment indicated  -     Pain Assessment    Pain Assessment No/denies pain  -     Cognitive Assessment/Intervention    Current Cognitive/Communication Assessment functional  -     Orientation Status oriented x 4  -MJ     Follows Commands/Answers Questions 100% of the time;able to follow multi-step instructions;needs cueing  -     Personal Safety WNL/WFL  -     ROM (Range of Motion)     General ROM no range of motion deficits identified  -MJ     MMT (Manual Muscle Testing)    General MMT Assessment no strength deficits identified  -MJ     General MMT Assessment Detail R ankle NT  -MJ     Mobility Assessment/Training    Extremity Weight-Bearing Status right lower extremity  -MJ     Right Lower Extremity Weight-Bearing non weight-bearing  -MJ     Bed Mobility, Assessment/Treatment    Bed Mobility, Assistive Device head of bed elevated  -MJ     Bed Mob, Supine to Sit, [place] conditional independence  -MJ     Bed Mob, Sit to Supine, [place] conditional independence  -MJ     Transfer Assessment/Treatment    Transfers, Sit-Stand [place] contact guard assist;verbal cues required  -MJ     Transfers, Stand-Sit [place] contact guard assist;verbal cues required  -MJ     Transfers, Sit-Stand-Sit, Assist Device rolling walker  -MJ     Transfer, Maintain Weight Bearing Status able to maintain weight bearing status  -MJ     Transfer, Safety Issues step length decreased;weight-shifting ability decreased  -MJ     Transfer, Impairments impaired balance  -MJ     Transfer, Comment Verbal cues for correct hand placement and to maintain NWB status R LE  -MJ     Gait Assessment/Treatment    Gait, [place] Level contact guard assist;verbal cues required  -MJ     Gait, Assistive Device rolling walker  -MJ     Gait, Distance (Feet) 4   bed to BSC, BSC to bed  -MJ     Gait, Gait Deviations elma decreased;step length decreased;weight-shifting ability decreased  -MJ     Gait, Maintain Weight Bearing Status able to maintain weight bearing status;cues to maintain weight bearing status  -MJ     Gait, Safety Issues balance decreased during turns;step length decreased;weight-shifting ability decreased  -MJ     Gait, Impairments impaired balance  -MJ     Gait, Comment Pt demo hop to gait pattern to t/f to/from BSC. Verbal cues for WBing status  -MJ     Positioning and Restraints    Pre-Treatment Position  in bed  -MJ     Post Treatment Position bed  -MJ     In Bed notified nsg;supine;call light within reach;encouraged to call for assist  -MJ       User Key  (r) = Recorded By, (t) = Taken By, (c) = Cosigned By    Initials Name Provider Type     Willard Mcdonald, PT Physical Therapist    TIM Tesfaye, RN Registered Nurse    DAMARIS Bosch, PT Physical Therapist    BS Freedom Manning, RN Registered Nurse        Physical Therapy Education     Title: PT OT SLP Therapies (Active)     Topic: Physical Therapy (Active)     Point: Mobility training (Done)    Learning Progress Summary    Learner Readiness Method Response Comment Documented by Status   Patient Acceptance E,D [facility] 01/05/17 1659 Done               Point: Body mechanics (Done)    Learning Progress Summary    Learner Readiness Method Response Comment Documented by Status   Patient Acceptance E,D [facility] 01/05/17 1659 Done               Point: Precautions (Done)    Learning Progress Summary    Learner Readiness Method Response Comment Documented by Status   Patient Acceptance E,D [facility] 01/05/17 1659 Done                      User Key     Initials Effective Dates Name Provider Type [facility] 03/14/16 -  Giovany Bosch, PT Physical Therapist PT                    Recommendation and Plan  Anticipated Discharge Disposition: home with assist  Planned Therapy Interventions: balance training, bed mobility training, home exercise program, patient/family education, stair training, strengthening, transfer training  PT Frequency: daily    Plan Of Care Reviewed With: patient   Progress: improving   Outcome Summary/Follow up Plan: Pt presents with open wounds to R ankle s/p surgical debridement of osteomylitis.  Pt will benefit from wound vac to help increase granulation, decrease bioburden, and improve healing potential in this complex patient.              IP PT Goals       01/06/17 0815 01/05/17 1659       Bed Mobility PT LTG    Bed Mobility PT LTG, Date  Established  01/05/17  -MJ     Bed Mobility PT LTG, Time to Achieve  5 days  -MJ     Bed Mobility PT LTG, Activity Type  supine to sit/sit to supine  -MJ     Bed Mobility PT LTG, [place] Level  independent  -MJ     Transfer Training PT LTG    Transfer Training PT LTG, Date Established  01/05/17  -MJ     Transfer Training PT LTG, Time to Achieve  5 days  -MJ     Transfer Training PT LTG, Activity Type  sit to stand/stand to sit  -MJ     Transfer Training PT LTG, [place] Level  conditional independence  -MJ     Transfer Training PT LTG, Assist Device  walker, rolling  -MJ     Gait Training PT LTG    Gait Training Goal PT LTG, Date Established  01/05/17  -MJ     Gait Training Goal PT LTG, Time to Achieve  5 days  -MJ     Gait Training Goal PT LTG, [place] Level  conditional independence  -MJ     Gait Training Goal PT LTG, Assist Device  walker, rolling  -MJ     Gait Training Goal PT LTG, Distance to Achieve  150 feet  -MJ     Stair Training PT LTG    Stair Training Goal PT LTG, Date Established  01/05/17  -MJ     Stair Training Goal PT LTG, Time to Achieve  5 days  -MJ     Stair Training Goal PT LTG, Number of Steps  1  -MJ     Stair Training Goal PT LTG, [place] Level  supervision required  -MJ     Stair Training Goal PT LTG, Assist Device  walker, rolling   backward  -MJ     Wound Care PT LTG    Wound Care PT LTG 1, Date Established 01/06/17  -MF      Wound Care PT LTG 1, Time to Achieve other (see comments)   10 days  -MF      Wound Care PT LTG 1, Location R lat ankle wound  -MF      Wound Care PT LTG 1, No S&S of Infection yes  -MF      Wound Care PT LTG 1, Decrease Wound Size 10%  -MF      Wound Care PT LTG 1, Education wound care  -MF      Wound Care PT LTG 1, Education Understanding verbalize understanding  -MF        User Key  (r) = Recorded By, (t) = Taken By, (c) = Cosigned By    Initials Name Provider Type    MARIS Mcdonald, PT Physical Therapist    DAMARIS Bosch, PT Physical  Therapist                Outcome Measures       01/05/17 1633          How much help from another person do you currently need...    Turning from your back to your side while in flat bed without using bedrails? 4  -MJ      Moving from lying on back to sitting on the side of a flat bed without bedrails? 4  -MJ      Moving to and from a bed to a chair (including a wheelchair)? 3  -MJ      Standing up from a chair using your arms (e.g., wheelchair, bedside chair)? 3  -MJ      Climbing 3-5 steps with a railing? 2  -MJ      To walk in hospital room? 3  -MJ      AM-PAC 6 Clicks Score 19  -MJ      Functional Assessment    Outcome Measure Options AM-PAC 6 Clicks Basic Mobility (PT)  -        User Key  (r) = Recorded By, (t) = Taken By, (c) = Cosigned By    Initials Name Provider Type    DAMARIS Bosch, [place] Physical Therapist              Time Calculation        PT Charges       01/06/17 0815          Time Calculation    Start Time 0815  -      PT Goal Re-Cert Due Date 01/15/17  -      Time Calculation- PT    Total Timed Code Minutes- PT 60 minute(s)  -        User Key  (r) = Recorded By, (t) = Taken By, (c) = Cosigned By    Initials Name Provider Type     Willard Mcdonald, PT Physical Therapist            Therapy Charges for Today     Code Description Service Date Service Provider Modifiers Qty    10327385132 HC PT THER SUPP EA 15 MIN 1/6/2017 Willard Mcdonald, PT GP 1    57570380434 HC PT THER PROC EA 15 MIN 1/6/2017 Willard Mcdonald, PT GP 2    78149846723 HC PT NEG PRESS WOUND TO 50SQCM DME1 1/6/2017 Willard Mcdonald, PT  1            PT G-Codes  Outcome Measure Options: AM-PAC 6 Clicks Basic Mobility (PT)       Willard Mcdonald, PT  1/6/2017

## 2017-01-06 NOTE — PLAN OF CARE
Problem: Patient Care Overview (Adult)  Goal: Plan of Care Review  Outcome: Ongoing (interventions implemented as appropriate)    01/06/17 0815   Coping/Psychosocial Response Interventions   Plan Of Care Reviewed With patient   Outcome Evaluation   Outcome Summary/Follow up Plan Pt presents with open wounds to R ankle s/p surgical debridement of osteomylitis. Pt will benefit from wound vac to help increase granulation, decrease bioburden, and improve healing potential in this complex patient.          Problem: Inpatient Physical Therapy  Goal: Wound Care Goal 1 LTG- PT  Outcome: Ongoing (interventions implemented as appropriate)    01/06/17 0815   Wound Care PT LTG   Wound Care PT LTG 1, Date Established 01/06/17   Wound Care PT LTG 1, Time to Achieve other (see comments)  (10 days)   Wound Care PT LTG 1, Location R lat ankle wound   Wound Care PT LTG 1, No S&S of Infection yes   Wound Care PT LTG 1, Decrease Wound Size 10%   Wound Care PT LTG 1, Education wound care   Wound Care PT LTG 1, Education Understanding verbalize understanding

## 2017-01-06 NOTE — PROGRESS NOTES
Discharge Planning Assessment  [facility]     Patient Name: Gloria Oropeza  MRN: [id number]  Today's Date: 1/6/2017    Admit Date: 1/5/2017          Discharge Needs Assessment       01/06/17 1215    Living Environment    Lives With alone    Living Arrangements house    Provides Primary Care For no one, unable/limited ability to care for self    Primary Care Provided By other (see comments);child(cheyenne) (specify)   has a son and neighbor that provide assistance    Quality Of Family Relationships supportive    Able to Return to Prior Living Arrangements yes    Discharge Needs Assessment    Concerns To Be Addressed basic needs concerns;discharge planning concerns    Readmission Within The Last 30 Days no previous admission in last 30 days    Anticipated Changes Related to Illness inability to care for self    Equipment Currently Used at Home walker, rolling    Equipment Needed After Discharge --   to be determined    Transportation Available car;family or friend will provide    Discharge Disposition home healthcare service    Discharge Contact Information if Applicable Vikram Oropeza ( son)  [phone]            Discharge Plan       01/06/17 1219    Case Management/Social Work Plan    Plan Home    Patient/Family In Agreement With Plan yes    Additional Comments I met with Ms Oropeza to discuss d/c plan. She lives alone in her own home in [place]. She has assistance from her son and a neighbor who assist with meal prep, laundry, etc. Prior to this admit she was independent in her home by using a rollator. We discussed knee walker, she will decide if she wants one after PT works with her again. She plans to borrow her neighbor's BSC ( to use over toilet as extension). I have notifed [facility] ( [place]) of orders for IV rocephin and IV daptomycin, which will be provided through [facility] office, their RN will also provide teaching. We discussed Home Health options. Case mgt will finalize HH arrangments  later today.         Discharge Placement     No information found                Demographic Summary       17 1213    Referral Information    Admission Type inpatient    Arrived From admitted as an inpatient;home or self-care    Referral Source physician    Reason For Consult discharge planning    Record Reviewed history and physical;medical record    Contact Information    Permission Granted to Share Information With     Primary Care Physician Information    Name  Murali Scott            Functional Status       17 1213    Functional Status Prior    Ambulation 1-->assistive equipment    Transferring 1-->assistive equipment    Toileting 1-->assistive equipment    Bathing 0-->independent    Dressing 0-->independent    Eating 0-->independent    Communication 0-->understands/communicates without difficulty    Swallowing 0-->swallows foods/liquids without difficulty    IADL    Medications independent    Meal Preparation assistive person    Housekeeping assistive person    Laundry assistive person    Shopping assistive person    Oral Care independent    Cognitive/Perceptual/Developmental    Current Mental Status/Cognitive Functioning no deficits noted    Recent Changes in Mental Status/Cognitive Functioning no changes    Employment/Financial    Source Of Income pension/penitentiary   does not work,recieves  's pension    Employment/Finance Comments Has [employer], which has coverage for Rx's            Psychosocial     None            Abuse/Neglect     None            Legal     None            Substance Abuse     None            Patient Forms     None          Sonja C Kellerman, RN

## 2017-01-06 NOTE — PROGRESS NOTES
[facility] Progress Note    Admission Date: 1/5/2017    Gloria Oropeza  1955  [id number]    Date: 1/6/2017    Antibiotics:  IV Anti-Infectives     Ordered     Dose/Rate Route Frequency Start Stop    01/05/17 1607  cefTRIAXone (ROCEPHIN) IVPB 2 g     Ordering Provider:  Tien Hinton MD    2 g  over 30 Minutes Intravenous Every 24 Hours 01/06/17 1400      01/06/17 0747  vancomycin (VANCOCIN) IVPB 1 g (premix) in Dextrose 5% 200 mL     Comments:  Goal trough = 12-18   Ordering Provider:  Yrn Reynoso, RPH    15 mg/kg × 61.2 kg  over 60 Minutes Intravenous Every 12 Hours Scheduled 01/06/17 0900      01/05/17 1607  Pharmacy to dose vancomycin     Comments:  Gloria Johnna  [id number]  By Tien Hinton MD   Ordering Provider:  Tien Hinton MD     Does not apply Continuous PRN 01/05/17 1607      01/05/17 1353  cefTRIAXone (ROCEPHIN) 2 g in sodium chloride 0.9 % 100 mL IVPB     Ordering Provider:  Cristina Lee MD    2 g  over 30 Minutes Intravenous Once 01/05/17 1430 01/05/17 1444    01/05/17 1353  vancomycin (VANCOCIN) IVPB 1 g (premix) in Dextrose 5% 200 mL     Ordering Provider:  Cristina Lee MD    1,000 mg Intravenous Once 01/05/17 1430 01/05/17 1453             CC:  Right ankle osteomyelitis    HPI:  Patient is a very pleasant 61 y.o. Yr old female with a history of right open reduction internal fixation of a bimalleolar fracture (jumped off a 4 garvey) on 10/20/16. She had an unremarkable postoperative course and was healing well. However patient did continue to smoke. Cast was removed in December 2016. Patient then developed some exposure of hardware over the ensuing weeks and did not seek medical attention. Was informed by a friend that despite seeing the metal on the outside of her ankle that it was not likely infected. Patient was evaluated by Dr. Lee who is now taking the patient to the operating room for hardware removal. Patient has not had a high fevers or chills. She has no  other localizing signs or symptoms of infection. I was consulted by  on 1/5/17 for further evaluation and treatment. Patient was admitted to [facility] on 1/5/17. 1/6/17  history reviewed.  Kameron abx.  Min right foot pain.       ROS:  No f/c/s. No n/v/d. No rash. No new ADR to Abx.     Constitutional-- No Fever, chills or sweats.  Appetite good, and no malaise. No fatigue.  Heent-- No new vision, hearing or throat complaints.  No epistaxis or oral sores.  Denies odynophagia or dysphagia.  No flashers, floaters or eye pain. No odynophagia or dysphagia. No headache, photophobia or neck stiffness.  CV-- No chest pain, palpitation or syncope  Resp-- No SOB/cough/Hemoptysis  GI- No nausea, vomiting, or diarrhea.  No hematochezia, melena, or hematemesis. Denies jaundice or chronic liver disease.  -- No dysuria, hematuria, or flank pain.  Denies hesitancy, urgency or flank pain.  Lymph- no swollen lymph nodes in neck/axilla or groin.   Heme- No active bruising or bleeding; no Hx of DVT or PE.  MS-- no swelling or pain in the bones or joints of arms/legs.  No new back pain.  Right foot pain.  Neuro-- No acute focal weakness or numbness in the arms or legs.  No seizures.  Skin--No rash, nodules, blisters.    Objective   PE:  Vital Signs  Temp  Min: 97.2 °F (36.2 °C)  Max: 98.2 °F (36.8 °C)  BP  Min: 99/54  Max: 116/59  Pulse  Min: 74  Max: 84  Resp  Min: 16  Max: 16  SpO2  Min: 96 %  Max: 99 %    GENERAL: Awake and alert, in mild distress.   HEENT: Normocephalic, atraumatic.  PERRL. EOMI. No conjunctival injection. No icterus. Oropharynx clear without evidence of thrush or exudate. No evidence of peridontal disease.    NECK: Supple without nuchal rigidity. No mass.  LYMPH: No cervical, axillary or inguinal lymphadenopathy.  HEART: RRR; No murmur, rubs, gallops.   LUNGS: Clear to auscultation bilaterally without wheezing, rales, rhonchi. Normal respiratory effort. Nonlabored. No  dullness.  ABDOMEN: Soft, nontender, nondistended. Positive bowel sounds. No rebound or guarding. NO mass or HSM.  EXT:  No cyanosis, clubbing or edema. No cord.  MSK: FROM without joint effusions noted arms/legs.    SKIN: Warm and dry without cutaneous eruptions on Inspection/palpation.  Surg dressing and wound vac on right ankle.  NEURO: Oriented to PPT. No focal deficits on motor/sensory exam at arms/legs.    Laboratory Data      Results from last 7 days  Lab Units 01/06/17  0518 01/05/17  1705   WBC 10*3/mm3 9.76 5.66   HEMOGLOBIN g/dL 11.6 13.2   HEMATOCRIT % 34.5 39.1   PLATELETS 10*3/mm3 210 225       Results from last 7 days  Lab Units 01/06/17  0518   SODIUM mmol/L 135   POTASSIUM mmol/L 4.5   CHLORIDE mmol/L 105   TOTAL CO2 mmol/L 25.0   BUN mg/dL 7*   CREATININE mg/dL 0.60   GLUCOSE mg/dL 148*   CALCIUM mg/dL 9.2       Results from last 7 days  Lab Units 01/06/17  0518   ALK PHOS U/L 66   BILIRUBIN mg/dL 0.2*   ALT (SGPT) U/L 14   AST (SGOT) U/L 18       Results from last 7 days  Lab Units 01/06/17  0518   SED RATE mm/hr 11       Results from last 7 days  Lab Units 01/05/17  1705   CRP mg/dL 4.90       Results from last 7 days  Lab Units 01/06/17  0518 01/05/17  1705   CK TOTAL U/L 60 75             Estimated Creatinine Clearance: 85.8 mL/min (by C-G formula based on Cr of 0.6).      Microbiology:  Right ankle cx 1/5/17 neg to date    Radiology:  Imaging Results (last 72 hours)     ** No results found for the last 72 hours. **          I personally reviewed the radiographic studies     Assessment/Plan   IMPRESSION:     1. Right ankle prosthetic device infection with possible tracking to the bone with osteomyelitis given the recent ORIF on 10/10/16, with exposed hardware. Usual organisms are related to skin quinten including staph and strep versus other. No evidence of abscess or necrotizing fasciitis.  2. Right bimalleolar fracture with ORIF repair 10/20/16.  3. Ongoing smoking, which may adversely affect  healing of wounds including her right ankle.     Plan:     1. Diagnostically, continue to follow patient's physical exam, CBC, CMP, CRP, ESR, cultures from the right ankle for routine culture and sensitivity, AFB, fungus, and also check CPK.  2. Therapeutically, daptomycin 500 mg IV daily, and Rocephin 2 g IV daily. She is likely to need 4-6 weeks of IV antibiotics until 2/15/17.  3. PICC line for long-term venous access.    Increased risk for adverse drug reactions, readmission, need for further surgery, pathologic fracture.  Discussed with case management and family.    Tien Hinton MD  1/6/2017

## 2017-01-06 NOTE — PROGRESS NOTES
"IM progress note      Gloria Oropeza  [id number]  1955     LOS: 1 day     Attending: Cristina Lee MD    Primary Care Provider: Murali Scott MD      Chief Complaint/Reason for visit:  wound dehisence    Subjective   Feels good. Pain control is adequate. Denies f/c/n/v/sob/cp.  Doing well. No new complaints. wy    Objective     Vital Signs  Blood pressure 116/56, pulse 75, temperature 97.6 °F (36.4 °C), temperature source Oral, resp. rate 16, height 62\" (157.5 cm), weight 135 lb (61.2 kg), SpO2 99 %.  Temp (24hrs), Av.9 °F (36.6 °C), Min:97.2 °F (36.2 °C), Max:98.3 °F (36.8 °C)      Intake/Output:    Intake/Output Summary (Last 24 hours) at 17 1346  Last data filed at 17 1209   Gross per 24 hour   Intake   1880 ml   Output   5000 ml   Net  -3120 ml       Nutrition:po    Respiratory:ra    Physical Therapy: PT eval completed. PT will follow to improve balance, to progress functional mobility with NWB status and to promote return to PLOF with increased independence and safety. Attempt knee walker tomorrow if pt agreeable, refused today. Plan is home with assist     Physical Exam:     General Appearance:    Alert, cooperative, in no acute distress   Head:    Normocephalic, without obvious abnormality, atraumatic    Lungs:     Normal effort, symmetric chest rise, no crepitus, clear to      auscultation bilaterally, diminished bases              Heart:    Regular rhythm and normal rate, normal S1 and S2   Abdomen:     Normal bowel sounds   Extremities:   Right foot splint CDI. Wound vac present . Moves right toes well.    Pulses:   Pulses palpable and equal bilaterally   Skin:   No bleeding, bruising or rash   Neurologic:   Moves all extremities with no obvious focal motor deficit.  Cranial nerves 2 - 12 grossly intact     Results Review:     I reviewed the patient's new clinical results.     Results from last 7 days  Lab Units 17  0518 17  1705   WBC 10*3/mm3 9.76 5.66 "   HEMOGLOBIN g/dL 11.6 13.2   HEMATOCRIT % 34.5 39.1   PLATELETS 10*3/mm3 210 225       Results from last 7 days  Lab Units 01/06/17  0518 01/05/17  1705   SODIUM mmol/L 135 142   POTASSIUM mmol/L 4.5 3.8   CHLORIDE mmol/L 105 106   TOTAL CO2 mmol/L 25.0 28.0   BUN mg/dL 7* 8*   CREATININE mg/dL 0.60 0.70   CALCIUM mg/dL 9.2 9.5   BILIRUBIN mg/dL 0.2* 0.1*   ALK PHOS U/L 66 80   ALT (SGPT) U/L 14 22   AST (SGOT) U/L 18 25   GLUCOSE mg/dL 148* 118*     Microbiology Results (last 21 days)        Procedure Component Value - Date/Time       Anaerobic Culture [[id number]] Collected: 01/05/17 1446       Lab Status: In process Specimen: Tissue from Ankle, Right Updated: 01/05/17 1454       Fungus Culture [[id number]] Collected: 01/05/17 1446       Lab Status: In process Specimen: Tissue from Ankle, Right Updated: 01/05/17 1454       Tissue Culture [[id number]] (Normal) Collected: 01/05/17 1446       Lab Status: Preliminary result Specimen: Tissue from Ankle, Right Updated: 01/06/17 0712        Culture No growth at 24 hours         Gram Stain Result No WBCs or organisms seen        AFB Culture [[id number]] Collected: 01/05/17 1446       Lab Status: Preliminary result Specimen: Tissue from Ankle, Right Updated: 01/06/17 1240        AFB Stain           No acid fast bacilli seen on concentrated smear        I reviewed the patient's new imaging including images and reports.    All medications reviewed.     ceftriaxone 2 g Intravenous Q24H   enoxaparin 40 mg Subcutaneous Q24H   gabapentin 400 mg Oral TID   nicotine 1 patch Transdermal Daily   saccharomyces boulardii 250 mg Oral BID   sennosides-docusate sodium 2 tablet Oral BID   vancomycin 15 mg/kg Intravenous Q12H       acetaminophen 650 mg Q4H PRN   acetaminophen 650 mg Q4H PRN   albuterol 2.5 mg Q4H PRN   ALPRAZolam 0.25 mg BID PRN   bisacodyl 10 mg Daily PRN   bisacodyl 10 mg Daily PRN   diphenhydrAMINE 25 mg Q6H PRN   docusate sodium 100 mg BID PRN   hydrALAZINE 10 mg Q6H PRN    HYDROcodone-acetaminophen 1 tablet Q4H PRN   HYDROcodone-acetaminophen 2 tablet Q4H PRN   HYDROmorphone 0.5 mg Q2H PRN   And     naloxone 0.1 mg Q5 Min PRN   influenza vaccine 0.5 mL During Hospitalization   magnesium hydroxide 10 mL Daily PRN   oxyCODONE-acetaminophen 1 tablet Q4H PRN   oxyCODONE-acetaminophen 2 tablet Q4H PRN   Pharmacy to dose vancomycin  Continuous PRN   promethazine 12.5 mg Q6H PRN   Or     promethazine 12.5 mg Q6H PRN   promethazine 12.5 mg Q6H PRN   sodium chloride 500 mL TID PRN       Assessment/Plan     Principal Problem:    S/P right ankle hardware removal with irrigation and debridement of ankle wounds  Active Problems:    Surgical wound breakdown    ORIF of fracture of ankle 10/20/2016    Hyperlipidemia    Tobacco abuse      Plan  1. PT/OT-NWB right foot  2. Pain control-prns   3. IS-encouraged  4. DVT proph- mechs/lovenox  5. Bowel regimen  6. Monitor post-op labs  7. DC planning, possibly tomorrow with . CM following  WOCN- wound vac  ID following- Vanc and Rocephin. Cultures pending. Prelim- no growth  Smoking cessation  Will need PICC line and arrangement for outpt antibiotics prior to discharge. carlos manuel Merrill, SALVATORE  01/06/17  1:46 PM   Seen and examined by me. Agree with above. Discussed with patient.

## 2017-01-06 NOTE — CONSULTS
"Pharmacy to dose vancomycin: osteomyelitis    60 yo female with right ankle fracture, removal of hardware, wound debridement    Ht = 62\"    Wt = 61.2 kg   IBW = 50.1 kg   AIBW = 54.5 kg    SCr = 0.7   Est CrCl = 73.5    Pt received 1 gm iv vanco at 1500 in OR today.  Will dose vanc at 15 mg/kg (1000 mg) iv q24h.  Trough due 1-7-17 at 0830. Trough goal is 12-18.    Pharmacy will continue to follow.    For Dr Hinton/  Lachelle Ratliff, [facility]  1-5-17 19:50  "

## 2017-01-06 NOTE — PROGRESS NOTES
Continued Stay Note  [facility]     Patient Name: Gloria Oropeza  MRN: [id number]  Today's Date: 1/6/2017    Admit Date: 1/5/2017          Discharge Plan       01/06/17 1094    Case Management/Social Work Plan    Plan Home    Patient/Family In Agreement With Plan yes    Additional Comments See previous Case mgt note.[facility] office will provide IV antibiotics and teaching, they also plan to do PICC line dressing changes and lab work weekly. Unfortunately we are unable to complete arrangments for Home health to do dressing changes , there are three Home health agencies in [facility], [facility] [place] at this time), [facility] ( does not take her insurance) and [facility] . I spoke with Severiano at [facility]  [phone], he has not been able to confirm that this agency can accept her insurance, this will not be complete until Monday 1/9, also PT will need final insurance approval for home wound vac. Discussed all with Ms Joshua, she verbalized understanding. If we cannot get a  agency to follow, she will have to come to here to [facility] as outpt for wound vac dressing changes 3 times per week, this was also discussed with Ms Oropeza. Case mgt will f/u Monday am.              Discharge Codes     None            Sonja C Kellerman, RN

## 2017-01-06 NOTE — PLAN OF CARE
Problem: Patient Care Overview (Adult)  Goal: Plan of Care Review  Outcome: Ongoing (interventions implemented as appropriate)  Goal: Discharge Needs Assessment  Outcome: Ongoing (interventions implemented as appropriate)    Problem: Perioperative Period (Adult)  Goal: Signs and Symptoms of Listed Potential Problems Will be Absent or Manageable (Perioperative Period)  Outcome: Ongoing (interventions implemented as appropriate)

## 2017-01-06 NOTE — PROGRESS NOTES
Orthopedic Foot/Ankle Progress Note    Subjective     Post-Operative Day:  LOS: 1 day   Systemic or Specific Complaints: only mild pain, block still in effect  Objective     Vital signs in last 24 hours:  Temp:  [97.5 °F (36.4 °C)-98.3 °F (36.8 °C)] 97.9 °F (36.6 °C)  Heart Rate:  [69-86] 74  Resp:  [16-18] 16  BP: ()/(41-74) 111/58    Neurovascular: Toes wiggle, still some numbness due to block   Wound: Wound vac to be placed today         Data Review  Lab Results (last 24 hours)     Procedure Component Value Units Date/Time    Tissue Exam [[id number]] Collected:  01/05/17 1443    Specimen:  Tissue from Ankle, Right Updated:  01/05/17 1452    Anaerobic Culture [[id number]] Collected:  01/05/17 1446    Specimen:  Tissue from Ankle, Right Updated:  01/05/17 1454    Fungus Culture [[id number]] Collected:  01/05/17 1446    Specimen:  Tissue from Ankle, Right Updated:  01/05/17 1454    AFB Culture [[id number]] Collected:  01/05/17 1446    Specimen:  Tissue from Ankle, Right Updated:  01/05/17 1454    CBC & Differential [[id number]] Collected:  01/05/17 1705    Specimen:  Blood Updated:  01/05/17 1734    Narrative:       The following orders were created for panel order CBC & Differential.  Procedure                               Abnormality         Status                     ---------                               -----------         ------                     CBC Auto Differential[[id number]]         Abnormal            Final result                 Please view results for these tests on the individual orders.    CBC Auto Differential [[id number]]  (Abnormal) Collected:  01/05/17 1705    Specimen:  Blood Updated:  01/05/17 1734     WBC 5.66 10*3/mm3      RBC 4.40 10*6/mm3      Hemoglobin 13.2 g/dL      Hematocrit 39.1 %      MCV 88.9 fL      MCH 30.0 pg      MCHC 33.8 g/dL      RDW 13.0 %      RDW-SD 42.7 fl      MPV 10.1 fL      Platelets 225 10*3/mm3      Neutrophil % 77.4 (H) %      Lymphocyte % 18.9 (L) %      Monocyte %  1.9 %      Eosinophil % 1.2 %      Basophil % 0.2 %      Immature Grans % 0.4 %      Neutrophils, Absolute 4.38 10*3/mm3      Lymphocytes, Absolute 1.07 10*3/mm3      Monocytes, Absolute 0.11 10*3/mm3      Eosinophils, Absolute 0.07 (L) 10*3/mm3      Basophils, Absolute 0.01 10*3/mm3      Immature Grans, Absolute 0.02 10*3/mm3     Comprehensive Metabolic Panel [[id number]]  (Abnormal) Collected:  01/05/17 1705    Specimen:  Blood Updated:  01/05/17 1801     Glucose 118 (H) mg/dL      BUN 8 (L) mg/dL      Creatinine 0.70 mg/dL      Sodium 142 mmol/L      Potassium 3.8 mmol/L      Chloride 106 mmol/L      CO2 28.0 mmol/L      Calcium 9.5 mg/dL      Total Protein 6.6 g/dL      Albumin 4.00 g/dL      ALT (SGPT) 22 U/L      AST (SGOT) 25 U/L      Alkaline Phosphatase 80 U/L      Total Bilirubin 0.1 (L) mg/dL      eGFR Non African Amer 85 mL/min/1.73      Globulin 2.6 gm/dL      A/G Ratio 1.5 g/dL      BUN/Creatinine Ratio 11.4      Anion Gap 8.0 mmol/L     Narrative:       National Kid[employer] Guidelines    Stage                           Description                             GFR                      1                               Normal or High                          90+  2                               Mild decrease                            60-89  3                               Moderate decrease                   30-59  4                               Severe decrease                       15-29  5                               Kidney failure                             <15    C-reactive Protein [[id number]]  (Normal) Collected:  01/05/17 1705    Specimen:  Blood Updated:  01/05/17 1801     C-Reactive Protein 4.90 mg/dL     CK [[id number]]  (Normal) Collected:  01/05/17 1705    Specimen:  Blood Updated:  01/05/17 1801     Creatine Kinase 75 U/L     Comprehensive Metabolic Panel [[id number]]  (Abnormal) Collected:  01/06/17 0518    Specimen:  Blood Updated:  01/06/17 0615     Glucose 148 (H) mg/dL      BUN 7 (L)  mg/dL      Creatinine 0.60 mg/dL      Sodium 135 mmol/L      Potassium 4.5 mmol/L      Chloride 105 mmol/L      CO2 25.0 mmol/L      Calcium 9.2 mg/dL      Total Protein 5.8 g/dL      Albumin 3.50 g/dL      ALT (SGPT) 14 U/L      AST (SGOT) 18 U/L      Alkaline Phosphatase 66 U/L      Total Bilirubin 0.2 (L) mg/dL      eGFR Non African Amer 102 mL/min/1.73      Globulin 2.3 gm/dL      A/G Ratio 1.5 g/dL      BUN/Creatinine Ratio 11.7      Anion Gap 5.0 mmol/L     Narrative:       National Kidney Foundation Guidelines    Stage                           Description                             GFR                      1                               Normal or High                          90+  2                               Mild decrease                            60-89  3                               Moderate decrease                   30-59  4                               Severe decrease                       15-29  5                               Kidney failure                             <15    CK [[id number]]  (Normal) Collected:  01/06/17 0518    Specimen:  Blood Updated:  01/06/17 0615     Creatine Kinase 60 U/L     CBC & Differential [[id number]] Collected:  01/06/17 0518    Specimen:  Blood Updated:  01/06/17 0621    Narrative:       The following orders were created for panel order CBC & Differential.  Procedure                               Abnormality         Status                     ---------                               -----------         ------                     CBC Auto Differential[[id number]]         Abnormal            Final result                 Please view results for these tests on the individual orders.    CBC Auto Differential [[id number]]  (Abnormal) Collected:  01/06/17 0518    Specimen:  Blood Updated:  01/06/17 0621     WBC 9.76 10*3/mm3      RBC 3.89 10*6/mm3      Hemoglobin 11.6 g/dL      Hematocrit 34.5 %      MCV 88.7 fL      MCH 29.8 pg      MCHC 33.6 g/dL      RDW 12.9 %      RDW-SD  41.2 fl      MPV 10.0 fL      Platelets 210 10*3/mm3      Neutrophil % 83.1 (H) %      Lymphocyte % 12.4 (L) %      Monocyte % 4.2 %      Eosinophil % 0.0 %      Basophil % 0.0 %      Immature Grans % 0.3 %      Neutrophils, Absolute 8.11 10*3/mm3      Lymphocytes, Absolute 1.21 10*3/mm3      Monocytes, Absolute 0.41 10*3/mm3      Eosinophils, Absolute 0.00 (L) 10*3/mm3      Basophils, Absolute 0.00 10*3/mm3      Immature Grans, Absolute 0.03 10*3/mm3      nRBC 0.0 /100 WBC     Sedimentation Rate [[id number]]  (Normal) Collected:  01/06/17 0518    Specimen:  Blood Updated:  01/06/17 0642     Sed Rate 11 mm/hr     Tissue Culture [[id number]]  (Normal) Collected:  01/05/17 1446    Specimen:  Tissue from Ankle, Right Updated:  01/06/17 0712     Culture No growth at 24 hours      Gram Stain Result No WBCs or organisms seen             Assessment/Plan     Status post- stable, s/p removal exposed hardware.  I was not able to close the wounds due to skin necrosis.  She will need wound vac, and at least 6 weeks IVabx.  I strongly recommend absolutely no smoking.  Labs do not show elevated WBC nor ESR/CRP.  However, because the hardware was exposed and the wounds are open, we have to assume the bone is contaminated.             Cristina Lee MD    Date: 1/6/2017  Time: 8:31 AM

## 2017-01-07 PROCEDURE — 25010000003 CEFTRIAXONE PER 250 MG: Performed by: INTERNAL MEDICINE

## 2017-01-07 PROCEDURE — 97116 GAIT TRAINING THERAPY: CPT

## 2017-01-07 PROCEDURE — 25010000002 ENOXAPARIN PER 10 MG: Performed by: ORTHOPAEDIC SURGERY

## 2017-01-07 PROCEDURE — C1894 INTRO/SHEATH, NON-LASER: HCPCS

## 2017-01-07 PROCEDURE — 25010000002 DAPTOMYCIN PER 1 MG: Performed by: INTERNAL MEDICINE

## 2017-01-07 PROCEDURE — C1751 CATH, INF, PER/CENT/MIDLINE: HCPCS

## 2017-01-07 PROCEDURE — 97605 NEG PRS WND THER DME<=50SQCM: CPT

## 2017-01-07 PROCEDURE — 63710000001 PROMETHAZINE PER 12.5 MG: Performed by: ORTHOPAEDIC SURGERY

## 2017-01-07 RX ORDER — GABAPENTIN 300 MG/1
600 CAPSULE ORAL EVERY 8 HOURS SCHEDULED
Status: DISCONTINUED | OUTPATIENT
Start: 2017-01-07 | End: 2017-01-09 | Stop reason: HOSPADM

## 2017-01-07 RX ORDER — SODIUM CHLORIDE 0.9 % (FLUSH) 0.9 %
10 SYRINGE (ML) INJECTION AS NEEDED
Status: DISCONTINUED | OUTPATIENT
Start: 2017-01-07 | End: 2017-01-09 | Stop reason: HOSPADM

## 2017-01-07 RX ADMIN — DOCUSATE SODIUM AND SENNOSIDES 2 TABLET: 8.6; 5 TABLET, FILM COATED ORAL at 08:33

## 2017-01-07 RX ADMIN — NICOTINE 1 PATCH: 21 PATCH, EXTENDED RELEASE TRANSDERMAL at 08:37

## 2017-01-07 RX ADMIN — CEFTRIAXONE SODIUM 2 G: 2 INJECTION, SOLUTION INTRAVENOUS at 16:33

## 2017-01-07 RX ADMIN — GABAPENTIN 600 MG: 300 CAPSULE ORAL at 21:24

## 2017-01-07 RX ADMIN — DOCUSATE SODIUM AND SENNOSIDES 2 TABLET: 8.6; 5 TABLET, FILM COATED ORAL at 16:33

## 2017-01-07 RX ADMIN — PROMETHAZINE HYDROCHLORIDE 12.5 MG: 12.5 TABLET ORAL at 12:34

## 2017-01-07 RX ADMIN — OXYCODONE AND ACETAMINOPHEN 2 TABLET: 5; 325 TABLET ORAL at 12:30

## 2017-01-07 RX ADMIN — OXYCODONE AND ACETAMINOPHEN 2 TABLET: 5; 325 TABLET ORAL at 03:35

## 2017-01-07 RX ADMIN — ENOXAPARIN SODIUM 40 MG: 40 INJECTION SUBCUTANEOUS at 08:33

## 2017-01-07 RX ADMIN — OXYCODONE AND ACETAMINOPHEN 2 TABLET: 5; 325 TABLET ORAL at 08:35

## 2017-01-07 RX ADMIN — Medication 250 MG: at 08:33

## 2017-01-07 RX ADMIN — Medication 250 MG: at 16:33

## 2017-01-07 RX ADMIN — GABAPENTIN 400 MG: 400 CAPSULE ORAL at 05:52

## 2017-01-07 RX ADMIN — GABAPENTIN 400 MG: 400 CAPSULE ORAL at 16:33

## 2017-01-07 RX ADMIN — OXYCODONE AND ACETAMINOPHEN 2 TABLET: 5; 325 TABLET ORAL at 16:33

## 2017-01-07 RX ADMIN — OXYCODONE AND ACETAMINOPHEN 2 TABLET: 5; 325 TABLET ORAL at 21:24

## 2017-01-07 RX ADMIN — ALPRAZOLAM 0.25 MG: 0.25 TABLET ORAL at 08:35

## 2017-01-07 RX ADMIN — DAPTOMYCIN 500 MG: 500 INJECTION, POWDER, LYOPHILIZED, FOR SOLUTION INTRAVENOUS at 20:07

## 2017-01-07 NOTE — PROGRESS NOTES
Acute Care - Physical Therapy Treatment Note  [facility]     Patient Name: Gloria Oropeza  : 1955  MRN: [id number]  Today's Date: 2017  Onset of Illness/Injury or Date of Surgery Date: 17  Date of Referral to PT: 17  Referring Physician: MD Jesus    Admit Date: 2017    Visit Dx:    ICD-10-CM ICD-9-CM   1. Open wound of ankle with complication, left, subsequent encounter S91.002D V58.89     891.1   2. Surgical wound breakdown, subsequent encounter T81.31XD V58.89     998.32   3. Impaired functional mobility, balance, gait, and endurance Z74.09 V49.89     Patient Active Problem List   Diagnosis   • Ankle fracture, right   • Closed right ankle fracture   • ORIF of fracture of ankle 10/20/2016   • Hyperlipidemia   • Tobacco abuse   • Surgical wound breakdown   • S/P right ankle hardware removal with irrigation and debridement of ankle wounds               Adult Rehabilitation Note       17 1500 17 1435       Rehab Assessment/Intervention    Discipline physical therapist  - physical therapist  -CT     Document Type therapy note (daily note)   wound care  - therapy note (daily note)  -CT     Subjective Information agree to therapy;complains of;pain   Pt had multiple questions about d/c plans and home vac  - agree to therapy;complains of   fullness in foot  -CT     Patient Effort, Rehab Treatment good  -JM      Precautions/Limitations fall precautions;non-weight bearing status   NWB RLE  -JM      Recorded by [JM] Izabela Adair, PT [CT] Valdemar Pal, PT     Pain Assessment    Pain Assessment 0-10  -JM 0-10  -CT     Pain Score 4  -JM 4  -CT     Post Pain Score 4  -JM 4  -CT     Pain Type  Acute pain  -CT     Recorded by [JM] Izabela Adair, PT [CT] Valdemar Pal, PT     Cognitive Assessment/Intervention    Current Cognitive/Communication Assessment functional  -      Orientation Status oriented x 4  -JM      Follows Commands/Answers Questions  100% of the time  -JM      Recorded by [JM] Izabela Adair, PT      Bed Mobility, Assessment/Treatment    Bed Mobility, Assistive Device  head of bed elevated  -CT     Bed Mobility, Roll Right, [place]  conditional independence  -CT     Bed Mob, Supine to Sit, [place]  conditional independence  -CT     Bed Mob, Sit to Supine, [place]  conditional independence  -CT     Recorded by  [CT] Valdemar Pal, PT     Transfer Assessment/Treatment    Transfers, Sit-Stand [place]  supervision required  -CT     Transfers, Sit-Stand-Sit, Assist Device  rolling walker  -CT     Transfer, Maintain Weight Bearing Status  able to maintain weight bearing status  -CT     Transfer, Impairments  impaired balance  -CT     Recorded by  [CT] Valdemar Pal, PT     Gait Assessment/Treatment    Gait, [place] Level  contact guard assist  -CT     Gait, Assistive Device  rolling walker  -CT     Gait, Distance (Feet)  120  -CT     Gait, Gait Deviations  elma decreased  -CT     Recorded by  [CT] Valdemar Pal PT     Positioning and Restraints    Pre-Treatment Position in bed  -JM in bed  -CT     Post Treatment Position bed  - bed  -CT     In Bed notified nsg;fowlers;call light within reach;encouraged to call for assist;with family/caregiver;RLE elevated  - notified nsg;sitting;exit alarm on;call light within reach  -CT     Recorded by [JM] Izabela Adair, PT [CT] Valdemar Pal, PT       User Key  (r) = Recorded By, (t) = Taken By, (c) = Cosigned By    Initials Name Effective Dates    CT Valdemar Pal, PT 06/19/15 -     JM Izabela Adair, PT 06/19/15 -                 IP PT Goals       01/07/17 1555 01/06/17 0815 01/05/17 1659    Bed Mobility PT LTG    Bed Mobility PT LTG, Date Established   01/05/17  -MJ    Bed Mobility PT LTG, Time to Achieve   5 days  -MJ    Bed Mobility PT LTG, Activity Type   supine to sit/sit to supine  -MJ    Bed Mobility PT LTG,  [place] Level   independent  -MJ    Bed Mobility PT LTG, Outcome goal met  -CT      Transfer Training PT LTG    Transfer Training PT LTG, Date Established   01/05/17  -MJ    Transfer Training PT LTG, Time to Achieve   5 days  -MJ    Transfer Training PT LTG, Activity Type   sit to stand/stand to sit  -MJ    Transfer Training PT LTG, [place] Level   conditional independence  -MJ    Transfer Training PT LTG, Assist Device   walker, rolling  -MJ    Transfer Training PT LTG, Outcome goal met  -CT      Gait Training PT LTG    Gait Training Goal PT LTG, Date Established   01/05/17  -MJ    Gait Training Goal PT LTG, Time to Achieve   5 days  -MJ    Gait Training Goal PT LTG, [place] Level   conditional independence  -MJ    Gait Training Goal PT LTG, Assist Device   walker, rolling  -MJ    Gait Training Goal PT LTG, Distance to Achieve   150 feet  -MJ    Stair Training PT LTG    Stair Training Goal PT LTG, Date Established   01/05/17  -MJ    Stair Training Goal PT LTG, Time to Achieve   5 days  -MJ    Stair Training Goal PT LTG, Number of Steps   1  -MJ    Stair Training Goal PT LTG, [place] Level   supervision required  -MJ    Stair Training Goal PT LTG, Assist Device   walker, rolling   backward  -MJ    Wound Care PT LTG    Wound Care PT LTG 1, Date Established  01/06/17  -MF     Wound Care PT LTG 1, Time to Achieve  other (see comments)   10 days  -MF     Wound Care PT LTG 1, Location  R lat ankle wound  -MF     Wound Care PT LTG 1, No S&S of Infection  yes  -MF     Wound Care PT LTG 1, Decrease Wound Size  10%  -MF     Wound Care PT LTG 1, Education  wound care  -MF     Wound Care PT LTG 1, Education Understanding  verbalize understanding  -MF       User Key  (r) = Recorded By, (t) = Taken By, (c) = Cosigned By    Initials Name Provider Type    MARIS Mcdonald, PT Physical Therapist    CT Valdemar Pal, PT Physical Therapist    DAMARIS Bosch, PT Physical Therapist          Physical  Therapy Education     Title: PT OT SLP Therapies (Active)     Topic: Physical Therapy (Active)     Point: Mobility training (Active)    Learning Progress Summary    Learner Readiness Method Response Comment Documented by Status   Patient Acceptance E,D NR  CT 01/07/17 1557 Active    Acceptance E,D [facility] 01/05/17 1659 Done               Point: Home exercise program (Active)    Learning Progress Summary    Learner Readiness Method Response Comment Documented by Status   Patient Acceptance E,D NR  CT 01/07/17 1557 Active               Point: Body mechanics (Active)    Learning Progress Summary    Learner Readiness Method Response Comment Documented by Status   Patient Acceptance E,D NR  CT 01/07/17 1557 Active    Acceptance E,D [facility] 01/05/17 1659 Done               Point: Precautions (Active)    Learning Progress Summary    Learner Readiness Method Response Comment Documented by Status   Patient Acceptance E,D NR  CT 01/07/17 1557 Active    Acceptance E,D [facility] 01/05/17 1659 Done                      User Key     Initials Effective Dates Name Provider Type Discipline    CT 06/19/15 -  Valdemar Pal, PT Physical Therapist PT     03/14/16 -  Giovany Bosch, PT Physical Therapist PT                    PT Recommendation and Plan  Anticipated Discharge Disposition: home with assist  Planned Therapy Interventions: balance training, bed mobility training, home exercise program, patient/family education, stair training, strengthening, transfer training  PT Frequency: daily  Plan of Care Review  Plan Of Care Reviewed With: patient  Progress: progress toward functional goals as expected  Outcome Summary/Follow up Plan: increased gait and distance with rolling walking and NWB ing well m/t          Outcome Measures       01/07/17 1435 01/05/17 1633       How much help from another person do you currently need...    Turning from your back to your side while in flat bed without using bedrails? 4  -CT 4  -MJ     Moving  from lying on back to sitting on the side of a flat bed without bedrails? 4  -CT 4  -MJ     Moving to and from a bed to a chair (including a wheelchair)? 4  -CT 3  -MJ     Standing up from a chair using your arms (e.g., wheelchair, bedside chair)? 4  -CT 3  -MJ     Climbing 3-5 steps with a railing? 3  -CT 2  -MJ     To walk in hospital room? 4  -CT 3  -MJ     AM-PAC 6 Clicks Score 23  -CT 19  -MJ     Functional Assessment    Outcome Measure Options AM-PAC 6 Clicks Basic Mobility (PT)  -CT AM-PAC 6 Clicks Basic Mobility (PT)  -MJ       User Key  (r) = Recorded By, (t) = Taken By, (c) = Cosigned By    Initials Name Provider Type    CT Valdemar Pal, PT Physical Therapist    DAMARIS Bosch, PT Physical Therapist           Time Calculation:         PT Charges       01/07/17 1558 01/07/17 1500       Time Calculation    Start Time 1435  -CT 1500  -     PT Received On 01/07/17  -CT      PT Goal Re-Cert Due Date  01/05/17  -     Time Calculation- PT    Total Timed Code Minutes- PT 30 minute(s)  -CT        User Key  (r) = Recorded By, (t) = Taken By, (c) = Cosigned By    Initials Name Provider Type    CT Valdemar Pal, PT Physical Therapist    WILY Adair, PT Physical Therapist          Therapy Charges for Today     Code Description Service Date Service Provider Modifiers Qty    74389389936 HC GAIT TRAINING EA 15 MIN 1/7/2017 Valdemar Pal, PT GP 2          PT G-Codes  Outcome Measure Options: AM-PAC 6 Clicks Basic Mobility (PT)    Valdemar Pal, PT  1/7/2017

## 2017-01-07 NOTE — PLAN OF CARE
Problem: Patient Care Overview (Adult)  Goal: Plan of Care Review  Outcome: Ongoing (interventions implemented as appropriate)    01/07/17 1555   Coping/Psychosocial Response Interventions   Plan Of Care Reviewed With patient   Outcome Evaluation   Outcome Summary/Follow up Plan increased gait and distance with rolling walking and NWB ing well m/t         Problem: Inpatient Physical Therapy  Goal: Bed Mobility Goal LTG- PT  Outcome: Outcome(s) achieved Date Met:  01/07/17  Goal: Transfer Training Goal 1 LTG- PT  Outcome: Outcome(s) achieved Date Met:  01/07/17 01/05/17 1659 01/07/17 1555   Transfer Training PT LTG   Transfer Training PT LTG, Date Established 01/05/17 --    Transfer Training PT LTG, Time to Achieve 5 days --    Transfer Training PT LTG, Activity Type sit to stand/stand to sit --    Transfer Training PT LTG, [place] Level conditional independence --    Transfer Training PT LTG, Assist Device walker, rolling --    Transfer Training PT LTG, Outcome --  goal met       Goal: Gait Training Goal LTG- PT  Outcome: Ongoing (interventions implemented as appropriate)    01/05/17 1659   Gait Training PT LTG   Gait Training Goal PT LTG, Date Established 01/05/17   Gait Training Goal PT LTG, Time to Achieve 5 days   Gait Training Goal PT LTG, [place] Level conditional independence   Gait Training Goal PT LTG, Assist Device walker, rolling   Gait Training Goal PT LTG, Distance to Achieve 150 feet       Goal: Stair Training Goal LTG- PT  Outcome: Ongoing (interventions implemented as appropriate)    01/05/17 1659   Stair Training PT LTG   Stair Training Goal PT LTG, Date Established 01/05/17   Stair Training Goal PT LTG, Time to Achieve 5 days   Stair Training Goal PT LTG, Number of Steps 1   Stair Training Goal PT LTG, [place] Level supervision required   Stair Training Goal PT LTG, Assist Device walker, rolling  (backward)

## 2017-01-07 NOTE — PROGRESS NOTES
Acute Care - Wound/Debridement Treatment Note  [facility]     Patient Name: Gloria Oropeza  : 1955  MRN: [id number]  Today's Date: 2017  Onset of Illness/Injury or Date of Surgery Date: 17   Date of Referral to PT: 17   Referring Physician: MD Jesus       Admit Date: 2017    Visit Dx:    ICD-10-CM ICD-9-CM   1. Open wound of ankle with complication, left, subsequent encounter S91.002D V58.89     891.1   2. Surgical wound breakdown, subsequent encounter T81.31XD V58.89     998.32   3. Impaired functional mobility, balance, gait, and endurance Z74.09 V49.89       Patient Active Problem List   Diagnosis   • Ankle fracture, right   • Closed right ankle fracture   • ORIF of fracture of ankle 10/20/2016   • Hyperlipidemia   • Tobacco abuse   • Surgical wound breakdown   • S/P right ankle hardware removal with irrigation and debridement of ankle wounds               LDA Wound       17 1500          Wound 17 0815 Right lateral ankle other (see comments)    Wound - Properties Group Date first assessed: 17  -MF Time first assessed: 815  -MF Side: Right  -MF Orientation: lateral  -MF Location: ankle  -MF Type: other (see comments)  -MF, surgical     Dressing Appearance other (see comments)   secured with ace wrap, PT did not visualize dressing  -JM      Therapy Setting (Negative Pressure Wound Therapy) continuous therapy  -      Pressure Setting (Negative Pressure Wound Therapy) 125 mmHg  -JM      Output (mL) 0  -JM      Wound 17 0815 Right medial ankle other (see comments)    Wound - Properties Group Date first assessed: 17  -MF Time first assessed: 815  -MF Side: Right  -MF Orientation: medial  -MF Location: ankle  -MF Type: other (see comments)  -MF, surgical     Dressing Appearance other (see comments)   PT did not visualize due to ace wrap/brace in place  -JM      Therapy Setting (Negative Pressure Wound Therapy) continuous therapy  -      Pressure  Setting (Negative Pressure Wound Therapy) 125 mmHg  -      Output (mL) 0  -JM        User Key  (r) = Recorded By, (t) = Taken By, (c) = Cosigned By    Initials Name Provider Type     Willard Mcdonald, PT Physical Therapist    WILY Adair, PT Physical Therapist                    Adult Rehabilitation Note       01/07/17 1500          Rehab Assessment/Intervention    Discipline physical therapist  -      Document Type therapy note (daily note)   wound care  -JM      Subjective Information agree to therapy;complains of;pain   Pt had multiple questions about d/c plans and home vac  -JM      Patient Effort, Rehab Treatment good  -JM      Precautions/Limitations fall precautions;non-weight bearing status   NWB RLE  -JM      Recorded by [WILY] Izabela Adair PT      Pain Assessment    Pain Assessment 0-10  -JM      Pain Score 4  -JM      Post Pain Score 4  -JM      Recorded by [WILY] Izabela Adair PT      Cognitive Assessment/Intervention    Current Cognitive/Communication Assessment functional  -JM      Orientation Status oriented x 4  -JM      Follows Commands/Answers Questions 100% of the time  -JM      Recorded by [WILY] Izabela Adair PT      Positioning and Restraints    Pre-Treatment Position in bed  -JM      Post Treatment Position bed  -JM      In Bed notified nsg;fowlers;call light within reach;encouraged to call for assist;with family/caregiver;RLE elevated  -JM      Recorded by [WILY] Izabela Adair PT        User Key  (r) = Recorded By, (t) = Taken By, (c) = Cosigned By    Initials Name Effective Dates    WILY Adair PT 06/19/15 -                 IP PT Goals       01/06/17 0815 01/05/17 1659       Bed Mobility PT LTG    Bed Mobility PT LTG, Date Established  01/05/17  -MJ     Bed Mobility PT LTG, Time to Achieve  5 days  -MJ     Bed Mobility PT LTG, Activity Type  supine to sit/sit to supine  -MJ     Bed Mobility PT LTG, [place] Level  independent  -MJ     Transfer  Training PT LTG    Transfer Training PT LTG, Date Established  01/05/17  -MJ     Transfer Training PT LTG, Time to Achieve  5 days  -MJ     Transfer Training PT LTG, Activity Type  sit to stand/stand to sit  -MJ     Transfer Training PT LTG, [place] Level  conditional independence  -MJ     Transfer Training PT LTG, Assist Device  walker, rolling  -MJ     Gait Training PT LTG    Gait Training Goal PT LTG, Date Established  01/05/17  -MJ     Gait Training Goal PT LTG, Time to Achieve  5 days  -MJ     Gait Training Goal PT LTG, [place] Level  conditional independence  -MJ     Gait Training Goal PT LTG, Assist Device  walker, rolling  -MJ     Gait Training Goal PT LTG, Distance to Achieve  150 feet  -MJ     Stair Training PT LTG    Stair Training Goal PT LTG, Date Established  01/05/17  -MJ     Stair Training Goal PT LTG, Time to Achieve  5 days  -MJ     Stair Training Goal PT LTG, Number of Steps  1  -MJ     Stair Training Goal PT LTG, [place] Level  supervision required  -MJ     Stair Training Goal PT LTG, Assist Device  walker, rolling   backward  -MJ     Wound Care PT LTG    Wound Care PT LTG 1, Date Established 01/06/17  -MF      Wound Care PT LTG 1, Time to Achieve other (see comments)   10 days  -MF      Wound Care PT LTG 1, Location R lat ankle wound  -MF      Wound Care PT LTG 1, No S&S of Infection yes  -MF      Wound Care PT LTG 1, Decrease Wound Size 10%  -MF      Wound Care PT LTG 1, Education wound care  -MF      Wound Care PT LTG 1, Education Understanding verbalize understanding  -MF        User Key  (r) = Recorded By, (t) = Taken By, (c) = Cosigned By    Initials Name Provider Type    MARIS Mcdonald, PT Physical Therapist    DAMARIS Bosch, PT Physical Therapist          Physical Therapy Education     Title: PT OT SLP Therapies (Active)     Topic: Physical Therapy (Active)     Point: Mobility training (Done)    Learning Progress Summary    Learner Readiness Method Response Comment  Documented by Status   Patient Acceptance EJADE [facility] 01/05/17 1659 Done               Point: Body mechanics (Done)    Learning Progress Summary    Learner Readiness Method Response Comment Documented by Status   Patient Acceptance JADE TALAVERA [facility] 01/05/17 1659 Done               Point: Precautions (Done)    Learning Progress Summary    Learner Readiness Method Response Comment Documented by Status   Patient Acceptance JADE TALAVERA   01/05/17 1659 Done                      User Key     Initials Effective Dates Name Provider Type Discipline     03/14/16 -  Giovany Bosch, PT Physical Therapist PT                   PT ASSESSMENT (last 72 hours)      PT Evaluation       01/07/17 1500 01/07/17 0259    Rehab Evaluation    Document Type therapy note (daily note)   wound care  -     Subjective Information agree to therapy;complains of;pain   Pt had multiple questions about d/c plans and home vac  -JM     Patient Effort, Rehab Treatment good  -JM     General Information    Precautions/Limitations fall precautions;non-weight bearing status   NWB RLE  -JM     Equipment Currently Used at Home  walker, rolling  -BS    Living Environment    Transportation Available  car;family or friend will provide  -    Pain Assessment    Pain Assessment 0-10  -JM     Pain Score 4  -JM     Post Pain Score 4  -JM     Cognitive Assessment/Intervention    Current Cognitive/Communication Assessment functional  -     Orientation Status oriented x 4  -JM     Follows Commands/Answers Questions 100% of the time  -JM     Positioning and Restraints    Pre-Treatment Position in bed  -JM     Post Treatment Position bed  -JM     In Bed notified nsg;fowlers;call light within reach;encouraged to call for assist;with family/caregiver;RLE elevated  -JM       01/06/17 1215 01/06/17 0815    Rehab Evaluation    Document Type  therapy note (daily note)   wound care eval  -    Subjective Information  agree to therapy;complains of;pain  -    General Information     Equipment Currently Used at Home walker, rolling  -SK     Living Environment    Lives With alone  -SK     Living Arrangements house  -SK     Transportation Available car;family or friend will provide  -SK     Clinical Impression    Date of Referral to PT  01/05/17  -    PT Diagnosis  open wound to R ankle  -    Rehab Potential  fair, will monitor progress closely  -    Pain Assessment    Pain Assessment  Rivera-Torres FACES  -    Rivera-Baker FACES Pain Rating  4  -    Pain Type  Acute pain  -    Pain Location  --   wound  -    Pain Intervention(s)  Repositioned  -    Positioning and Restraints    Pre-Treatment Position  in bed  -    Post Treatment Position  bed  -MF    In Bed  supine;RLE elevated  -      01/06/17 0316 01/05/17 1633    Rehab Evaluation    Document Type  evaluation  -    Subjective Information  agree to therapy;no complaints  -MJ    Patient Effort, Rehab Treatment  good  -    Symptoms Noted During/After Treatment  none  -    General Information    Patient Profile Review  yes  -MJ    Onset of Illness/Injury or Date of Surgery Date  01/05/17  -    Referring Physician  MD Jesus  -    General Observations  Pt supine in bed, IV, ace bandage/splint to R ankle. Friend present  -    Pertinent History Of Current Problem  Pt presents for surgical removal of wound debridement of R ankle. Pt underwent R ankle ORIF 10/2016 after sustaining trimalleolar fx from jumping off a four garvey  -    Precautions/Limitations  fall precautions;non-weight bearing status   NWB R LE  -MJ    Prior Level of Function  min assist:;all household mobility;community mobility;gait;independent:;transfer;bed mobility   Pt has been using rollator and NWB since sx, indep prior to  -MJ    Equipment Currently Used at Home  other (see comments)   rollator  -MJ    Plans/Goals Discussed With  patient;agreed upon  -MJ    Risks Reviewed  patient:;LOB;increased discomfort  -MJ    Benefits Reviewed   patient:;improve function;increase independence;increase strength;increase balance;decrease pain  -MJ    Barriers to Rehab  none identified  -MJ    Living Environment    Lives With  alone  -MJ    Living Arrangements  house  -MJ    Home Accessibility  stairs within home;tub/shower is not walk in  -MJ    Number of Stairs Within Home  1  -MJ    Transportation Available car;family or friend will provide  -BS     Clinical Impression    Date of Referral to PT  01/05/17  -MJ    PT Diagnosis  s/p R ankle hardware removal, I&D  -MJ    Criteria for Skilled Therapeutic Interventions Met  yes;treatment indicated  -MJ    Pain Assessment    Pain Assessment  No/denies pain  -MJ    Cognitive Assessment/Intervention    Current Cognitive/Communication Assessment  functional  -MJ    Orientation Status  oriented x 4  -MJ    Follows Commands/Answers Questions  100% of the time;able to follow multi-step instructions;needs cueing  -MJ    Personal Safety  WNL/WFL  -MJ    ROM (Range of Motion)    General ROM  no range of motion deficits identified  -MJ    MMT (Manual Muscle Testing)    General MMT Assessment  no strength deficits identified  -MJ    General MMT Assessment Detail  R ankle NT  -MJ    Mobility Assessment/Training    Extremity Weight-Bearing Status  right lower extremity  -MJ    Right Lower Extremity Weight-Bearing  non weight-bearing  -MJ    Bed Mobility, Assessment/Treatment    Bed Mobility, Assistive Device  head of bed elevated  -MJ    Bed Mob, Supine to Sit, [place]  conditional independence  -MJ    Bed Mob, Sit to Supine, [place]  conditional independence  -MJ    Transfer Assessment/Treatment    Transfers, Sit-Stand [place]  contact guard assist;verbal cues required  -MJ    Transfers, Stand-Sit [place]  contact guard assist;verbal cues required  -MJ    Transfers, Sit-Stand-Sit, Assist Device  rolling walker  -MJ    Transfer, Maintain Weight Bearing Status  able to maintain weight bearing status  -     Transfer, Safety Issues  step length decreased;weight-shifting ability decreased  -MJ    Transfer, Impairments  impaired balance  -MJ    Transfer, Comment  Verbal cues for correct hand placement and to maintain NWB status R LE  -MJ    Gait Assessment/Treatment    Gait, [place] Level  contact guard assist;verbal cues required  -MJ    Gait, Assistive Device  rolling walker  -MJ    Gait, Distance (Feet)  4   bed to BSC, BSC to bed  -MJ    Gait, Gait Deviations  elma decreased;step length decreased;weight-shifting ability decreased  -MJ    Gait, Maintain Weight Bearing Status  able to maintain weight bearing status;cues to maintain weight bearing status  -MJ    Gait, Safety Issues  balance decreased during turns;step length decreased;weight-shifting ability decreased  -MJ    Gait, Impairments  impaired balance  -MJ    Gait, Comment  Pt demo hop to gait pattern to t/f to[place] [facility] Verbal cues for WBing status  -MJ    Positioning and Restraints    Pre-Treatment Position  in bed  -MJ    Post Treatment Position  bed  -MJ    In Bed  notified nsg;supine;call light within reach;encouraged to call for assist  -      01/05/17 3777       General Information    Equipment Currently Used at Home walker, standard  -MB     Living Environment    Lives With alone  -MB     Living Arrangements house  -MB     Home Accessibility bed and bath on same level;no concerns  -MB     Stair Railings at Home none  -MB     Type of Financial/Environmental Concern none  -MB     Transportation Available car  -MB       User Key  (r) = Recorded By, (t) = Taken By, (c) = Cosigned By    Initials Name Provider Type    MARIS Mcdonald, PT Physical Therapist    MB Madelyn Tesfaye, RN Registered Nurse    DAMARIS Bosch, PT Physical Therapist    SK Sonja C Kellerman, RN Case Manager    BS Freedom Manning, RN Registered Nurse    WILY Adair, PT Physical Therapist            PT Recommendation and Plan  Anticipated Discharge Disposition:  home with assist  Planned Therapy Interventions: balance training, bed mobility training, home exercise program, patient/family education, stair training, strengthening, transfer training  PT Frequency: daily    Plan Of Care Reviewed With: patient, family   Progress: progress toward functional goals as expected   Outcome Summary/Follow up Plan: Wound vac operating without any alarming, dressing obscured by ace wrap and bace so PT did not visualize dressing today.  Will check on dressing tomorow and change vac sponge on Monday 1/9/17.  Pt's home vac unit has been approved for d/c.  Pt awaiting final d/c plans (HH vs OP services for PICC and IV abx, and for wound vac changes).          Outcome Measures       01/05/17 1633          How much help from another person do you currently need...    Turning from your back to your side while in flat bed without using bedrails? 4  -MJ      Moving from lying on back to sitting on the side of a flat bed without bedrails? 4  -MJ      Moving to and from a bed to a chair (including a wheelchair)? 3  -MJ      Standing up from a chair using your arms (e.g., wheelchair, bedside chair)? 3  -MJ      Climbing 3-5 steps with a railing? 2  -MJ      To walk in hospital room? 3  -MJ      AM-PAC 6 Clicks Score 19  -MJ      Functional Assessment    Outcome Measure Options AM-PAC 6 Clicks Basic Mobility (PT)  -        User Key  (r) = Recorded By, (t) = Taken By, (c) = Cosigned By    Initials Name Provider Type    DAMARIS Bosch PT Physical Therapist              Time Calculation        PT Charges       01/07/17 1500          Time Calculation    Start Time 1500  -      PT Goal Re-Cert Due Date 01/05/17  -WILY        User Key  (r) = Recorded By, (t) = Taken By, (c) = Cosigned By    Initials Name Provider Type    WILY Adair PT Physical Therapist             Therapy Charges for Today     Code Description Service Date Service Provider Modifiers Qty    [id number]  PT NEG PRESS WOUND  TO 50SQCM DME1 1/7/2017 Izabela Adair, PT  1            PT G-Codes  Outcome Measure Options: AM-PAC 6 Clicks Basic Mobility (PT)        Izabela Adair, PT  1/7/2017

## 2017-01-07 NOTE — PLAN OF CARE
Problem: Patient Care Overview (Adult)  Goal: Plan of Care Review  Outcome: Ongoing (interventions implemented as appropriate)    01/07/17 1500   Coping/Psychosocial Response Interventions   Plan Of Care Reviewed With patient;family   Patient Care Overview   Progress progress toward functional goals as expected   Outcome Evaluation   Outcome Summary/Follow up Plan Wound vac operating without any alarming, no drainage from wounds, dressing obscured by ace wrap and brace so PT did not visualize dressing today. Will check on dressing tomorow and change vac sponge on Monday 1/9/17. Pt's home vac unit has been approved for d/c. Pt awaiting final d/c plans (HH vs OP services for PICC and IV abx, and for wound vac changes).  PT had long discussion with pt and family about potential d/c plans and plan to pre-medicate for pain prior to next vac change.         Problem: Inpatient Physical Therapy  Goal: Wound Care Goal 1 LTG- PT  Outcome: Ongoing (interventions implemented as appropriate)    01/06/17 0815   Wound Care PT LTG   Wound Care PT LTG 1, Date Established 01/06/17   Wound Care PT LTG 1, Time to Achieve other (see comments)  (10 days)   Wound Care PT LTG 1, Location R lat ankle wound   Wound Care PT LTG 1, No S&S of Infection yes   Wound Care PT LTG 1, Decrease Wound Size 10%   Wound Care PT LTG 1, Education wound care   Wound Care PT LTG 1, Education Understanding verbalize understanding

## 2017-01-07 NOTE — PROGRESS NOTES
"IM progress note      Gloria Oropeza  [id number]  1955     LOS: 2 days     Attending: Cristina Lee MD    Primary Care Provider: Murali Scott MD      Chief Complaint/Reason for visit:  wound dehisence    Subjective   No new complaints. No f/c/n/vom/sob.    Objective     Vital Signs  Blood pressure 111/58, pulse 72, temperature 96.6 °F (35.9 °C), temperature source Tympanic, resp. rate 18, height 62\" (157.5 cm), weight 135 lb (61.2 kg), SpO2 98 %.  Temp (24hrs), Av.3 °F (36.3 °C), Min:96.6 °F (35.9 °C), Max:97.8 °F (36.6 °C)      Intake/Output:    Intake/Output Summary (Last 24 hours) at 17 1800  Last data filed at 17 1713   Gross per 24 hour   Intake    820 ml   Output   3900 ml   Net  -3080 ml       Nutrition:po    Respiratory:ra    Physical Therapy:  Plan Of Care Reviewed With: patient, family    Progress: progress toward functional goals as expected    Outcome Summary/Follow up Plan: Wound vac operating without any alarming, dressing obscured by ace wrap and bace so PT did not visualize dressing today. Will check on dressing tomorow and change vac sponge on 17. Pt's home vac unit has been approved for d/c. Pt awaiting final d/c plans (HH vs OP services for PICC and IV abx, and for wound vac changes).    Physical Exam:     General Appearance:    Alert, cooperative, in no acute distress   Head:    Normocephalic, without obvious abnormality, atraumatic    Lungs:     Normal effort, symmetric chest rise, no crepitus, clear to      auscultation bilaterally, diminished bases              Heart:    Regular rhythm and normal rate, normal S1 and S2   Abdomen:     Normal bowel sounds   Extremities:   Right foot splint CDI. Wound vac present . Moves right toes well.    Pulses:   Pulses palpable and equal bilaterally   Skin:   No bleeding, bruising or rash   Neurologic:   Moves all extremities with no obvious focal motor deficit.  Cranial nerves 2 - 12 grossly intact     Results " Review:     I reviewed the patient's new clinical results.     Results from last 7 days  Lab Units 01/06/17  0518 01/05/17  1705   WBC 10*3/mm3 9.76 5.66   HEMOGLOBIN g/dL 11.6 13.2   HEMATOCRIT % 34.5 39.1   PLATELETS 10*3/mm3 210 225       Results from last 7 days  Lab Units 01/06/17  0518 01/05/17  1705   SODIUM mmol/L 135 142   POTASSIUM mmol/L 4.5 3.8   CHLORIDE mmol/L 105 106   TOTAL CO2 mmol/L 25.0 28.0   BUN mg/dL 7* 8*   CREATININE mg/dL 0.60 0.70   CALCIUM mg/dL 9.2 9.5   BILIRUBIN mg/dL 0.2* 0.1*   ALK PHOS U/L 66 80   ALT (SGPT) U/L 14 22   AST (SGOT) U/L 18 25   GLUCOSE mg/dL 148* 118*     Microbiology Results (last 21 days)        Procedure Component Value - Date/Time       Anaerobic Culture [[id number]] (Normal) Collected: 01/05/17 1446       Lab Status: Preliminary result Specimen: Tissue from Ankle, Right Updated: 01/06/17 1430        Culture No anaerobes isolated        Fungus Culture [[id number]] Collected: 01/05/17 1446       Lab Status: In process Specimen: Tissue from Ankle, Right Updated: 01/05/17 1454       Tissue Culture [[id number]] (Normal) Collected: 01/05/17 1446       Lab Status: Preliminary result Specimen: Tissue from Ankle, Right Updated: 01/07/17 0637        Culture No growth at 2 days         Gram Stain Result No WBCs or organisms seen        AFB Culture [[id number]] Collected: 01/05/17 1446       Lab Status: Preliminary result Specimen: Tissue from Ankle, Right Updated: 01/06/17 1240        AFB Stain           No acid fast bacilli seen on concentrated smear          I reviewed the patient's new imaging including images and reports.    All medications reviewed.     ceftriaxone 2 g Intravenous Q24H   DAPTOmycin (CUBICIN)   mg Intravenous Q24H   enoxaparin 40 mg Subcutaneous Q24H   gabapentin 600 mg Oral Q8H   nicotine 1 patch Transdermal Daily   saccharomyces boulardii 250 mg Oral BID   sennosides-docusate sodium 2 tablet Oral BID       acetaminophen 650 mg Q4H PRN   acetaminophen 650  mg Q4H PRN   albuterol 2.5 mg Q4H PRN   ALPRAZolam 0.25 mg BID PRN   bisacodyl 10 mg Daily PRN   bisacodyl 10 mg Daily PRN   diphenhydrAMINE 25 mg Q6H PRN   docusate sodium 100 mg BID PRN   hydrALAZINE 10 mg Q6H PRN   HYDROcodone-acetaminophen 1 tablet Q4H PRN   HYDROcodone-acetaminophen 2 tablet Q4H PRN   HYDROmorphone 0.5 mg Q2H PRN   And     naloxone 0.1 mg Q5 Min PRN   influenza vaccine 0.5 mL During Hospitalization   magnesium hydroxide 10 mL Daily PRN   oxyCODONE-acetaminophen 1 tablet Q4H PRN   oxyCODONE-acetaminophen 2 tablet Q4H PRN   promethazine 12.5 mg Q6H PRN   Or     promethazine 12.5 mg Q6H PRN   promethazine 12.5 mg Q6H PRN   sodium chloride 500 mL TID PRN   sodium chloride 10 mL PRN       Assessment/Plan     Principal Problem:    S/P right ankle hardware removal with irrigation and debridement of ankle wounds  Active Problems:    ORIF of fracture of ankle 10/20/2016    Hyperlipidemia    Tobacco abuse    Surgical wound breakdown      Plan  1. PT/OT-NWB right foot  2. Pain control-prns. Increase Neurontin dose.   3. IS-encouraged  4. DVT proph- mechs/lovenox  5. Bowel regimen  6. DC planning Will need PICC line and arrangement for outpt antibiotics prior to discharge. wy  WOCN- wound vac  ID following- Vanc and Rocephin.    Discussed with patient and Nurse.    Crystal Anna MD  01/07/17  6:00 PM

## 2017-01-07 NOTE — CONSULTS
Placed by BRIJESH Lin RN - confirmed with 3CG technology.  LUE PICC single lumen 36cm total, 0 exposed.

## 2017-01-08 LAB
BACTERIA SPEC AEROBE CULT: ABNORMAL
GRAM STN SPEC: ABNORMAL

## 2017-01-08 PROCEDURE — 25010000002 DAPTOMYCIN PER 1 MG: Performed by: INTERNAL MEDICINE

## 2017-01-08 PROCEDURE — 25010000002 ENOXAPARIN PER 10 MG: Performed by: ORTHOPAEDIC SURGERY

## 2017-01-08 PROCEDURE — 25010000003 CEFTRIAXONE PER 250 MG: Performed by: INTERNAL MEDICINE

## 2017-01-08 PROCEDURE — 97605 NEG PRS WND THER DME<=50SQCM: CPT

## 2017-01-08 RX ADMIN — DOCUSATE SODIUM AND SENNOSIDES 2 TABLET: 8.6; 5 TABLET, FILM COATED ORAL at 08:42

## 2017-01-08 RX ADMIN — DAPTOMYCIN 500 MG: 500 INJECTION, POWDER, LYOPHILIZED, FOR SOLUTION INTRAVENOUS at 21:34

## 2017-01-08 RX ADMIN — OXYCODONE AND ACETAMINOPHEN 2 TABLET: 5; 325 TABLET ORAL at 08:41

## 2017-01-08 RX ADMIN — DOCUSATE SODIUM AND SENNOSIDES 2 TABLET: 8.6; 5 TABLET, FILM COATED ORAL at 17:57

## 2017-01-08 RX ADMIN — OXYCODONE AND ACETAMINOPHEN 2 TABLET: 5; 325 TABLET ORAL at 13:15

## 2017-01-08 RX ADMIN — NICOTINE 1 PATCH: 21 PATCH, EXTENDED RELEASE TRANSDERMAL at 08:54

## 2017-01-08 RX ADMIN — ENOXAPARIN SODIUM 40 MG: 40 INJECTION SUBCUTANEOUS at 08:41

## 2017-01-08 RX ADMIN — OXYCODONE AND ACETAMINOPHEN 2 TABLET: 5; 325 TABLET ORAL at 21:34

## 2017-01-08 RX ADMIN — CEFTRIAXONE SODIUM 2 G: 2 INJECTION, SOLUTION INTRAVENOUS at 13:28

## 2017-01-08 RX ADMIN — Medication 250 MG: at 17:57

## 2017-01-08 RX ADMIN — GABAPENTIN 600 MG: 300 CAPSULE ORAL at 13:15

## 2017-01-08 RX ADMIN — OXYCODONE AND ACETAMINOPHEN 2 TABLET: 5; 325 TABLET ORAL at 03:35

## 2017-01-08 RX ADMIN — Medication 250 MG: at 08:42

## 2017-01-08 RX ADMIN — GABAPENTIN 600 MG: 300 CAPSULE ORAL at 05:47

## 2017-01-08 RX ADMIN — GABAPENTIN 600 MG: 300 CAPSULE ORAL at 21:34

## 2017-01-08 NOTE — PLAN OF CARE
Problem: Patient Care Overview (Adult)  Goal: Plan of Care Review  Outcome: Ongoing (interventions implemented as appropriate)  Goal: Discharge Needs Assessment  Outcome: Ongoing (interventions implemented as appropriate)    Problem: Perioperative Period (Adult)  Goal: Signs and Symptoms of Listed Potential Problems Will be Absent or Manageable (Perioperative Period)  Outcome: Ongoing (interventions implemented as appropriate)    Problem: Orthopaedic Fracture (Adult)  Goal: Signs and Symptoms of Listed Potential Problems Will be Absent or Manageable (Orthopaedic Fracture)  Outcome: Ongoing (interventions implemented as appropriate)

## 2017-01-08 NOTE — PLAN OF CARE
Problem: Patient Care Overview (Adult)  Goal: Plan of Care Review  Outcome: Ongoing (interventions implemented as appropriate)    01/08/17 1000   Coping/Psychosocial Response Interventions   Plan Of Care Reviewed With patient   Patient Care Overview   Progress progress toward functional goals as expected   Outcome Evaluation   Outcome Summary/Follow up Plan VAC dressing intact, pump operating normally without issues. Plan to change wound vac dressing tomorrow and pre-medicate for tx. Home vac approved and awaiting d/c plans (HH vs. OP).         Problem: Inpatient Physical Therapy  Goal: Wound Care Goal 1 LTG- PT  Outcome: Ongoing (interventions implemented as appropriate)    01/06/17 0815   Wound Care PT LTG   Wound Care PT LTG 1, Date Established 01/06/17   Wound Care PT LTG 1, Time to Achieve other (see comments)  (10 days)   Wound Care PT LTG 1, Location R lat ankle wound   Wound Care PT LTG 1, No S&S of Infection yes   Wound Care PT LTG 1, Decrease Wound Size 10%   Wound Care PT LTG 1, Education wound care   Wound Care PT LTG 1, Education Understanding verbalize understanding

## 2017-01-08 NOTE — PROGRESS NOTES
Orthopedic Foot/Ankle Progress Note    Subjective     Post-Operative Day:  LOS: 3 days   Systemic or Specific Complaints: some tingling/shooting pain  Objective     Vital signs in last 24 hours:  Temp:  [96.6 °F (35.9 °C)-98.4 °F (36.9 °C)] 96.6 °F (35.9 °C)  Heart Rate:  [70-72] 71  Resp:  [18-20] 18  BP: (101-111)/(55-58) 108/55    Neurovascular: Toes wiggle, pink, as pre-op   Wound: Stable, wound vac in place         Data Review  Lab Results (last 24 hours)     Procedure Component Value Units Date/Time    Tissue Culture [[id number]]  (Abnormal) Collected:  01/05/17 1446    Specimen:  Tissue from Ankle, Right Updated:  01/08/17 1153     Culture        Scant growth (1+) Corynebacterium species (A)      Presumptively identified.          Gram Stain Result No WBCs or organisms seen             Assessment/Plan     Status post- stable, cultures show corynebact- will await sensitivities, continue wound vac           Cristina Lee MD    Date: 1/8/2017  Time: 12:24 PM

## 2017-01-08 NOTE — PROGRESS NOTES
" progress note      Gloria Oropeza  [id number]  1955     LOS: 3 days     Attending: Cristina Lee MD    Primary Care Provider: Murali Scott MD      Chief Complaint/Reason for visit:  wound dehisence    Subjective   No new complaints. Feels ok.     Objective     Vital Signs  Blood pressure 90/56, pulse 76, temperature 97.6 °F (36.4 °C), temperature source Oral, resp. rate 16, height 62\" (157.5 cm), weight 135 lb (61.2 kg), SpO2 97 %.  Temp (24hrs), Av.5 °F (36.4 °C), Min:96.6 °F (35.9 °C), Max:98.4 °F (36.9 °C)      Intake/Output:    Intake/Output Summary (Last 24 hours) at 17 1707  Last data filed at 17 1328   Gross per 24 hour   Intake    850 ml   Output   2950 ml   Net  -2100 ml       Nutrition:po    Respiratory:ra       Physical Exam:     General Appearance:    Alert, cooperative, in no acute distress   Head:    Normocephalic, without obvious abnormality, atraumatic    Lungs:     Normal effort, symmetric chest rise, no crepitus, clear to      auscultation bilaterally, diminished bases              Heart:    Regular rhythm and normal rate, normal S1 and S2   Abdomen:     Normal bowel sounds   Extremities:   Right foot splint CDI. Wound vac present . Moves right toes well.    Pulses:   Pulses palpable and equal bilaterally   Skin:   No bleeding, bruising or rash   Neurologic:   Moves all extremities with no obvious focal motor deficit.  Cranial nerves 2 - 12 grossly intact     Results Review:     I reviewed the patient's new clinical results.     Results from last 7 days  Lab Units 17  0518 17  1705   WBC 10*3/mm3 9.76 5.66   HEMOGLOBIN g/dL 11.6 13.2   HEMATOCRIT % 34.5 39.1   PLATELETS 10*3/mm3 210 225       Results from last 7 days  Lab Units 17  0518 17  1705   SODIUM mmol/L 135 142   POTASSIUM mmol/L 4.5 3.8   CHLORIDE mmol/L 105 106   TOTAL CO2 mmol/L 25.0 28.0   BUN mg/dL 7* 8*   CREATININE mg/dL 0.60 0.70   CALCIUM mg/dL 9.2 9.5   BILIRUBIN mg/dL 0.2* " 0.1*   ALK PHOS U/L 66 80   ALT (SGPT) U/L 14 22   AST (SGOT) U/L 18 25   GLUCOSE mg/dL 148* 118*     Microbiology Results (last 21 days)        Procedure Component Value - Date/Time       Anaerobic Culture [[id number]] (Normal) Collected: 01/05/17 1446       Lab Status: Preliminary result Specimen: Tissue from Ankle, Right Updated: 01/06/17 1430        Culture No anaerobes isolated        Fungus Culture [[id number]] Collected: 01/05/17 1446       Lab Status: In process Specimen: Tissue from Ankle, Right Updated: 01/05/17 1454       Tissue Culture [[id number]] (Abnormal) Collected: 01/05/17 1446       Lab Status: Final result Specimen: Tissue from Ankle, Right Updated: 01/08/17 1153        Culture           Scant growth (1+) Corynebacterium species (A)         Presumptively identified.              Gram Stain Result No WBCs or organisms seen        AFB Culture [[id number]] Collected: 01/05/17 1446       Lab Status: Preliminary result Specimen: Tissue from Ankle, Right Updated: 01/06/17 1240        AFB Stain           No acid fast bacilli seen on concentrated smear               I reviewed the patient's new imaging including images and reports.    All medications reviewed.     ceftriaxone 2 g Intravenous Q24H   DAPTOmycin (CUBICIN)   mg Intravenous Q24H   enoxaparin 40 mg Subcutaneous Q24H   gabapentin 600 mg Oral Q8H   nicotine 1 patch Transdermal Daily   saccharomyces boulardii 250 mg Oral BID   sennosides-docusate sodium 2 tablet Oral BID       acetaminophen 650 mg Q4H PRN   acetaminophen 650 mg Q4H PRN   albuterol 2.5 mg Q4H PRN   ALPRAZolam 0.25 mg BID PRN   bisacodyl 10 mg Daily PRN   bisacodyl 10 mg Daily PRN   diphenhydrAMINE 25 mg Q6H PRN   docusate sodium 100 mg BID PRN   hydrALAZINE 10 mg Q6H PRN   HYDROcodone-acetaminophen 1 tablet Q4H PRN   HYDROcodone-acetaminophen 2 tablet Q4H PRN   HYDROmorphone 0.5 mg Q2H PRN   And     naloxone 0.1 mg Q5 Min PRN   influenza vaccine 0.5 mL During Hospitalization    magnesium hydroxide 10 mL Daily PRN   oxyCODONE-acetaminophen 1 tablet Q4H PRN   oxyCODONE-acetaminophen 2 tablet Q4H PRN   promethazine 12.5 mg Q6H PRN   Or     promethazine 12.5 mg Q6H PRN   promethazine 12.5 mg Q6H PRN   sodium chloride 500 mL TID PRN   sodium chloride 10 mL PRN       Assessment/Plan     Principal Problem:    S/P right ankle hardware removal with irrigation and debridement of ankle wounds  Active Problems:    ORIF of fracture of ankle 10/20/2016    Hyperlipidemia    Tobacco abuse    Surgical wound breakdown    Corynebacterium positive tissue culture       Plan  1. PT/OT-NWB right foot  2. Pain control-prns. Increase Neurontin dose.   3. IS-encouraged  4. DVT proph- mechs/lovenox  5. Bowel regimen  6. DC planning   PICC line( placed)  And needs arrangement for outpt antibiotics prior to discharge   WOCN- wound vac  ID following- Dapto and Rocephin.    Discussed with patient and Nurse.    Crystal Anna MD  01/08/17  5:07 PM

## 2017-01-09 VITALS
HEIGHT: 62 IN | BODY MASS INDEX: 24.84 KG/M2 | RESPIRATION RATE: 18 BRPM | HEART RATE: 78 BPM | OXYGEN SATURATION: 95 % | TEMPERATURE: 97.4 F | SYSTOLIC BLOOD PRESSURE: 96 MMHG | DIASTOLIC BLOOD PRESSURE: 53 MMHG | WEIGHT: 135 LBS

## 2017-01-09 PROCEDURE — 25010000003 CEFTRIAXONE PER 250 MG: Performed by: INTERNAL MEDICINE

## 2017-01-09 PROCEDURE — 25010000002 ENOXAPARIN PER 10 MG: Performed by: ORTHOPAEDIC SURGERY

## 2017-01-09 PROCEDURE — 97116 GAIT TRAINING THERAPY: CPT

## 2017-01-09 PROCEDURE — 97605 NEG PRS WND THER DME<=50SQCM: CPT

## 2017-01-09 PROCEDURE — 97110 THERAPEUTIC EXERCISES: CPT

## 2017-01-09 RX ORDER — NICOTINE 21 MG/24HR
1 PATCH, TRANSDERMAL 24 HOURS TRANSDERMAL DAILY
Qty: 28 PATCH | Refills: 0 | Status: SHIPPED | OUTPATIENT
Start: 2017-01-09 | End: 2017-02-06

## 2017-01-09 RX ORDER — CEFTRIAXONE SODIUM 2 G/50ML
2 INJECTION, SOLUTION INTRAVENOUS EVERY 24 HOURS
Refills: 0
Start: 2017-01-09 | End: 2017-04-17

## 2017-01-09 RX ORDER — ASPIRIN 81 MG/1
81 TABLET ORAL DAILY
Qty: 60 TABLET | Refills: 5
Start: 2017-01-09 | End: 2017-04-17

## 2017-01-09 RX ORDER — GABAPENTIN 300 MG/1
600 CAPSULE ORAL EVERY 8 HOURS SCHEDULED
Qty: 60 CAPSULE | Refills: 0 | Status: SHIPPED | OUTPATIENT
Start: 2017-01-09 | End: 2017-08-16

## 2017-01-09 RX ADMIN — Medication 250 MG: at 08:28

## 2017-01-09 RX ADMIN — OXYCODONE AND ACETAMINOPHEN 2 TABLET: 5; 325 TABLET ORAL at 05:40

## 2017-01-09 RX ADMIN — OXYCODONE AND ACETAMINOPHEN 2 TABLET: 5; 325 TABLET ORAL at 11:49

## 2017-01-09 RX ADMIN — GABAPENTIN 600 MG: 300 CAPSULE ORAL at 05:40

## 2017-01-09 RX ADMIN — ENOXAPARIN SODIUM 40 MG: 40 INJECTION SUBCUTANEOUS at 08:28

## 2017-01-09 RX ADMIN — NICOTINE 1 PATCH: 21 PATCH, EXTENDED RELEASE TRANSDERMAL at 08:30

## 2017-01-09 RX ADMIN — GABAPENTIN 600 MG: 300 CAPSULE ORAL at 14:21

## 2017-01-09 RX ADMIN — OXYCODONE AND ACETAMINOPHEN 2 TABLET: 5; 325 TABLET ORAL at 16:56

## 2017-01-09 RX ADMIN — CEFTRIAXONE SODIUM 2 G: 2 INJECTION, SOLUTION INTRAVENOUS at 14:21

## 2017-01-09 RX ADMIN — HYDROCODONE BITARTRATE AND ACETAMINOPHEN 2 TABLET: 7.5; 325 TABLET ORAL at 13:30

## 2017-01-09 RX ADMIN — POTASSIUM CHLORIDE, DEXTROSE MONOHYDRATE AND SODIUM CHLORIDE 100 ML/HR: 150; 5; 450 INJECTION, SOLUTION INTRAVENOUS at 10:10

## 2017-01-09 NOTE — DISCHARGE SUMMARY
Patient Name: Gloria Oropeza  MRN: [id number]  : 1955  DOS: 2017    Attending: Cristina Lee MD    Primary Care Provider: Murali Scott MD    Date of Admission:.2017 11:50 AM    Date of Discharge:  2017    Discharge Diagnosis:  Principal Problem:        S/P right ankle hardware removal with irrigation and debridement of ankle wounds  Active Problems:    Right ankle prosthetic device infection with possible tracking to the bone with osteomyelitis given the recent ORIF on 10/10/16, with exposed hardware. Usual organisms are related to skin quinten including staph and strep versus other. No evidence of abscess or necrotizing fasciitis. Corynebacterium isolated. Skin quinten.  ORIF of fracture of ankle 10/20/2016    Hyperlipidemia    Tobacco abuse    Corynebacterium positive tissue culture    Hospital Course  Patient is a 61 y.o. female presented for right ankle hardware removal with irrigation and debridement of ankle wounds by Dr. Lee. She tolerated surgery well and was admitted for further medical management. She had ORIF right ankle in Oct. 2016. When seen in office for follow up by Dr. Lee, she noted exposed hardware in the ankle wound.     Per : ((The patient is a very pleasant 61-year-old status post open reduction and internal fixation of a right ankle fracture on 10/20/2016. The patient is a smoker. I counseled her regarding the 4 times greater risk of complications for foot and ankle surgery in smokers. The patient decreased but did not quit smoking. She has had very slowly healing incisions. I saw her in 2016 and the fractures were healed radiographically. I allowed her to begin weight-bearing. Sometime between yesterday and then she noted hardware visible in the wound. She spoke with a neighbor who did not think it was infected. She has not had any fevers or chills. She notes that the wounds have closed up a little bit since then. I saw her in the office  yesterday and found the exposed hardware. I recommend we treat this as an urgent problem. The plan is for removal of the hardware, irrigation and debridement of the wounds, probable wound VAC and at least 6 weeks of IV antibiotics. At the time of surgery, I found the exposed hardware. There were necrotic skin edges. There was very little inflammatory response, only slight swelling, no purulence and no significant erythema. Deep cultures were obtained per routine and antibiotics were started after the cultures))      Infectious Disease was consulted for antibiotic management. A PICC line was placed and arrangements for long-term antibiotic infusion were made.  Smoking cessation information was given and patient was verbally counseled.   A wound vac was applied to the ankle wound, and WOCN followed patient throughout admission.    Patient was provided pain medications as needed for pain control.    She was seen by PT has progressed well over her stay.  She used an IS for atelectasis prophylaxis and Lovenox along with mechanicals for DVT prophylaxis.  Home medications were resumed as appropriate, and labs were monitored and remained fairly stable.   With the progress she has made, she is ready for DC home today.    Wound vac in place, keep CDI.  Discussed with patient regarding plan and she shows understanding and agreement.        Procedures Performed  DATE OF PROCEDURE:  01/05/2017     SURGEON: Cristina Brandon MD     ASSISTANT: Renae Dozier PA-C, who was present for the entire procedure including prepping, draping, retraction, closure, and dressing.       ANESTHESIA: General with popliteal and adductor blocks.       PREOPERATIVE DIAGNOSIS: Wound dehiscence with exposed hardware, right ankle medial and lateral malleolus.       POSTOPERATIVE DIAGNOSIS: Wound dehiscence with exposed hardware, right ankle medial and lateral malleolus.       PROCEDURE PERFORMED: Extensive irrigation and debridement medial and lateral soft  tissue and removal of hardware right ankle.         Pertinent Test Results:    I reviewed the patient's new clinical results.     Results from last 7 days  Lab Units 01/06/17  0518 01/05/17  1705   WBC 10*3/mm3 9.76 5.66   HEMOGLOBIN g/dL 11.6 13.2   HEMATOCRIT % 34.5 39.1   PLATELETS 10*3/mm3 210 225       Results from last 7 days  Lab Units 01/06/17  0518 01/05/17  1705   SODIUM mmol/L 135 142   POTASSIUM mmol/L 4.5 3.8   CHLORIDE mmol/L 105 106   TOTAL CO2 mmol/L 25.0 28.0   BUN mg/dL 7* 8*   CREATININE mg/dL 0.60 0.70   CALCIUM mg/dL 9.2 9.5   BILIRUBIN mg/dL 0.2* 0.1*   ALK PHOS U/L 66 80   ALT (SGPT) U/L 14 22   AST (SGOT) U/L 18 25   GLUCOSE mg/dL 148* 118*     Microbiology Results (last 21 days)        Procedure Component Value - Date/Time       Anaerobic Culture [[id number]] (Normal) Collected: 01/05/17 1446       Lab Status: Preliminary result Specimen: Tissue from Ankle, Right Updated: 01/06/17 1430        Culture No anaerobes isolated        Fungus Culture [[id number]] Collected: 01/05/17 1446       Lab Status: In process Specimen: Tissue from Ankle, Right Updated: 01/05/17 1454       Tissue Culture [[id number]] (Abnormal) Collected: 01/05/17 1446       Lab Status: Final result Specimen: Tissue from Ankle, Right Updated: 01/08/17 1153        Culture           Scant growth (1+) Corynebacterium species (A)         Presumptively identified.              Gram Stain Result No WBCs or organisms seen        AFB Culture [[id number]] Collected: 01/05/17 1446       Lab Status: Preliminary result Specimen: Tissue from Ankle, Right Updated: 01/06/17 1240        AFB Stain           No acid fast bacilli seen on concentrated smear        I reviewed the patient's new imaging including images and reports.      Physical therapy: increased gait and distance with rolling walking and NWB ing well m/t    Discharge Assessment:    Vital Signs  Visit Vitals   • BP 96/53 (BP Location: Right arm, Patient Position: Lying)   • Pulse 78   •  "Temp 97.4 °F (36.3 °C) (Oral)   • Resp 18   • Ht 62\" (157.5 cm)   • Wt 135 lb (61.2 kg)   • SpO2 95%   • BMI 24.69 kg/m2     Temp (24hrs), Av.7 °F (36.5 °C), Min:97.3 °F (36.3 °C), Max:98.4 °F (36.9 °C)      General Appearance:    Alert, cooperative, in no acute distress   Lungs:     Clear to auscultation,respirations regular, even and                   unlabored    Heart:    Regular rhythm and normal rate, normal S1 and S2   Abdomen:     Normal bowel sounds   Extremities:   Right foot splint CDI. Wound vac present . Moves right toes well.     Pulses:   Pulses palpable and equal bilaterally   Skin:   No bleeding, bruising or rash   Neurologic:   Cranial nerves 2 - 12 grossly intact, sensation intact       Discharge Disposition: Home with wound vac and IV antibiotics    Discharge Medications   Gloria Oropeza   Home Medication Instructions DANIA:[id number]    Printed on:17 3546   Medication Information                      ALPRAZolam (XANAX) 0.25 MG tablet  Take 0.25 mg by mouth 2 (Two) Times a Day As Needed for anxiety.             aspirin 81 MG EC tablet  Take 1 tablet by mouth Daily. Resume in 2 weeks             cefTRIAXone (ROCEPHIN) 40 MG/ML IVPB  Infuse 50 mL into a venous catheter Daily. Per [facility]  Indications: Skin and Soft Tissue Infection             DAPTOmycin 500 mg in sodium chloride 0.9 % 50 mL  Infuse 500 mg into a venous catheter Daily. Per [facility]  Indications: Bone and Joint Infection             enoxaparin (LOVENOX) 40 MG/0.4ML solution syringe  Inject 0.4 mL under the skin Daily. For 2 weeks             gabapentin (NEURONTIN) 300 MG capsule  Take 2 capsules by mouth Every 8 (Eight) Hours.             HYDROcodone-acetaminophen (NORCO)  MG per tablet  Take 1 tablet by mouth Every 6 (Six) Hours As Needed for moderate pain (4-6).             nicotine (NICODERM CQ) 21 MG/24HR patch  Place 1 patch on the skin Daily for 28 days.             ondansetron (ZOFRAN) 4 MG tablet  Take 1 tablet " by mouth Every 6 (Six) Hours As Needed for nausea.             saccharomyces boulardii (FLORASTOR) 250 MG capsule  Take 250 mg by mouth 2 (Two) Times a Day.             sennosides-docusate sodium (SENOKOT-S) 8.6-50 MG tablet  Take 1 tablet by mouth Daily.                 Discharge Diet:   Diet Instructions     You may resume a regular diet.           Regular diet    Activity at Discharge:   Activity Instructions     Do NOT bear any weight on your Right leg. Keep your right foot elevated.            NWB right foot    Follow-up Appointments  [facility]       Crystal Anna MD  01/09/17  2:37 PM  Seen and examined by me. Agree with above. Discussed with patient.   Discharge took over 30 min  Discussed with .

## 2017-01-09 NOTE — PROGRESS NOTES
Orthopedic Foot/Ankle Progress Note    Subjective     Post-Operative Day:  LOS: 4 days   Systemic or Specific Complaints: better pain control  Objective     Vital signs in last 24 hours:  Temp:  [96.6 °F (35.9 °C)-98.4 °F (36.9 °C)] 97.3 °F (36.3 °C)  Heart Rate:  [72-82] 82  Resp:  [16] 16  BP: ()/(55-56) 108/56    Neurovascular: stable   Wound: Stable, wound vac in place         Data Review  Lab Results (last 24 hours)     Procedure Component Value Units Date/Time    Tissue Culture [[id number]]  (Abnormal) Collected:  01/05/17 1446    Specimen:  Tissue from Ankle, Right Updated:  01/08/17 1153     Culture        Scant growth (1+) Corynebacterium species (A)      Presumptively identified.          Gram Stain Result No WBCs or organisms seen             Assessment/Plan     Status post- stable, d/c home with IV abx, wound vac, non-wt bearing.  I wrote rx for nicoderm patches at her request.  She already has percocet and lortab and zofran prescriptions from office  1/4/17          Cristina Lee MD    Date: 1/9/2017  Time: 8:25 AM

## 2017-01-09 NOTE — THERAPY DISCHARGE NOTE
Acute Care - Wound/Debridement Treatment Note/Discharge  [facility]     Patient Name: Gloria Oropeza  : 1955  MRN: [id number]  Today's Date: 2017  Onset of Illness/Injury or Date of Surgery Date: 17   Date of Referral to PT: 17   Referring Physician: MD Jesus       Admit Date: 2017    Visit Dx:    ICD-10-CM ICD-9-CM   1. Open wound of ankle with complication, left, subsequent encounter S91.002D V58.89     891.1   2. Surgical wound breakdown, subsequent encounter T81.31XD V58.89     998.32   3. Impaired functional mobility, balance, gait, and endurance Z74.09 V49.89   4. S/P right ankle hardware removal with irrigation and debridement of ankle wounds Z98.890 V45.89       Patient Active Problem List   Diagnosis   • Ankle fracture, right   • Closed right ankle fracture   • ORIF of fracture of ankle 10/20/2016   • Hyperlipidemia   • Tobacco abuse   • Surgical wound breakdown   • S/P right ankle hardware removal with irrigation and debridement of ankle wounds               LDA Wound       17 1335          Wound 17 0815 Right lateral ankle other (see comments)    Wound - Properties Group Date first assessed: 17  -MF Time first assessed: 815  -MF Side: Right  -MF Orientation: lateral  -MF Location: ankle  -MF Type: other (see comments)  -MF, surgical     Dressing Appearance no drainage;dry;intact  -MF      Base moist;clean;pink  -MF      Drainage Characteristics/Odor sanguineous  -MF      Drainage Amount none  -MF      Wound Cleaning irrigated with;sterile normal saline  -MF      Therapy Setting (Negative Pressure Wound Therapy) continuous therapy  -MF      Pressure Setting (Negative Pressure Wound Therapy) 125 mmHg  -MF      Dressing (Negative Pressure Wound Therapy) foam, black  -MF      Sponges Inserted (Negative Pressure Wound Therapy) 3   1 black and 2 adaptic  -MF      Sponges Removed (Negative Pressure Wound Therapy) 3   1 black and 2 adaptic  -MF      Output  (mL) 0  -MF      Wound 01/06/17 0815 Right medial ankle other (see comments)    Wound - Properties Group Date first assessed: 01/06/17  - Time first assessed: 0815  -MF Side: Right  -MF Orientation: medial  -MF Location: ankle  -MF Type: other (see comments)  -MF, surgical     Dressing Appearance no drainage;dry;intact  -MF      Base moist;pink;clean  -MF      Drainage Characteristics/Odor serosanguineous  -MF      Drainage Amount small  -MF      Wound Cleaning irrigated with;sterile normal saline  -MF      Therapy Setting (Negative Pressure Wound Therapy) continuous therapy  -      Pressure Setting (Negative Pressure Wound Therapy) 125 mmHg  -MF      Dressing (Negative Pressure Wound Therapy) foam, black  -MF      Sponges Inserted (Negative Pressure Wound Therapy) 1   1 connecting piece to wound a only  -MF      Sponges Removed (Negative Pressure Wound Therapy) 1  -MF      Output (mL) 0  -MF        User Key  (r) = Recorded By, (t) = Taken By, (c) = Cosigned By    Initials Name Provider Type     Willrad Mcdonald, PT Physical Therapist              Finger sweep and visual inspection performed. Empty wound bed confirmed.  External label applied and verified.            Adult Rehabilitation Note       01/09/17 1335 01/09/17 0935 01/08/17 1000    Rehab Assessment/Intervention    Discipline physical therapist  - physical therapist  -TRACY physical therapist  -    Document Type therapy note (daily note);discharge summary  - therapy note (daily note);progress note  - therapy note (daily note)   wound care  -    Subjective Information agree to therapy;complains of;weakness;fatigue;pain  - agree to therapy;no complaints  -TRACY agree to therapy;complains of;pain  -    Patient Effort, Rehab Treatment  good  -TRACY good  -    Symptoms Noted During/After Treatment  none  -TRACY none  -    Recorded by [MF] Willard Mcdonald, PT [TRACY] Apple Brink, PT [JM] Izabela Adair, PT    Pain Assessment    Pain  Assessment Rivera-Torres FACES  -MF 0-10  -TRACY 0-10  -JM    Rivera-Torres FACES Pain Rating 6  -MF      Pain Score  2  -TRACY 3  -JM    Post Pain Score  2  -TRACY 3  -JM    Pain Location Ankle  -MF Ankle  -TRACY Ankle  -JM    Pain Orientation Right  -MF Right  -TRACY Right  -JM    Pain Intervention(s) Medication (See MAR);Repositioned  -MF      Recorded by [MF] Willard Mcdonald, PT [TRACY] Apple Brink, PT [JM] Izabela Adair, PT    Cognitive Assessment/Intervention    Current Cognitive/Communication Assessment  functional  -TRACY functional  -JM    Orientation Status  oriented x 4  -TRACY oriented x 4  -JM    Follows Commands/Answers Questions  100% of the time  -TRACY 100% of the time  -JM    Recorded by  [TRACY] Apple Brink, PT [JM] Izabela Adair, PT    Bed Mobility, Assessment/Treatment    Bed Mobility, Assistive Device  head of bed elevated;bed rails  -TRACY     Bed Mobility, Scoot/Bridge, [place]  independent  -TRACY     Bed Mob, Supine to Sit, [place]  conditional independence  -TRACY     Bed Mob, Sit to Supine, [place]  conditional independence  -TRACY     Recorded by  [TRACY] Apple Brink, PT     Transfer Assessment/Treatment    Transfers, Sit-Stand [place]  supervision required  -TRACY     Transfers, Stand-Sit [place]  supervision required  -TRACY     Transfers, Sit-Stand-Sit, Assist Device  rolling walker  -TRACY     Transfer, Maintain Weight Bearing Status  able to maintain weight bearing status  -TRACY     Transfer, Safety Issues  sequencing ability decreased;weight-shifting ability decreased  -TRACY     Transfer, Comment  Pt able to maintain WBing stauts without cues to do so. Pt well aware and demonstrated good safety and balance throughout.  -TRACY     Recorded by  [TRACY] Apple Brink, PT     Gait Assessment/Treatment    Gait, [place] Level  contact guard assist  -TRACY     Gait, Assistive Device  rolling walker  -TRAYC     Gait, Distance (Feet)  120  -TRACY     Gait, Gait Deviations   elma decreased;step length decreased;weight-shifting ability decreased  -     Gait, Maintain Weight Bearing Status  able to maintain weight bearing status  -     Gait, Safety Issues  step length decreased  -     Gait, Comment  Pt with hop-to gait pattern per weightbearing status. Pt demosntrated good awareness, safety, and balance throughout.  -     Recorded by  [TRACY] Apple Brink, PT     Balance Skills Training    Standing-Level of Assistance  Close supervision  -     Static Standing Balance Support  assistive device   Completed several seconds of standing without holding to AD  -TRACY     Recorded by  [TRACY] Apple Brink, OLIMPIA     Positioning and Restraints    Pre-Treatment Position in bed  - in bed  - in bed  -    Post Treatment Position bed  - bed  - bed  -    In Bed supine;call light within reach;with family/caregiver  - notified nsg;supine;call light within reach;encouraged to call for assist;exit alarm on;SCD pump applied;RLE elevated;LUE elevated  - notified nsg;fowlers;call light within reach;encouraged to call for assist;RLE elevated  -    Recorded by [] Willard Mcdonald, PT [TRACY] Apple Brink, PT [] Izabela Adair, PT      01/07/17 1500 01/07/17 0885       Rehab Assessment/Intervention    Discipline physical therapist  - physical therapist  -CT     Document Type therapy note (daily note)   wound care  - therapy note (daily note)  -CT     Subjective Information agree to therapy;complains of;pain   Pt had multiple questions about d/c plans and home vac  - agree to therapy;complains of   fullness in foot  -CT     Patient Effort, Rehab Treatment good  -      Precautions/Limitations fall precautions;non-weight bearing status   NWB RLE  -      Recorded by [] Izabela Adair, PT [CT] Valdemar Pal, PT     Pain Assessment    Pain Assessment 0-10  -JM 0-10  -CT     Pain Score 4  -JM 4  -CT     Post Pain Score 4  -JM 4  -CT      Pain Type  Acute pain  -CT     Recorded by [JM] Izabela Adair, PT [CT] Valdemar Pal, PT     Cognitive Assessment/Intervention    Current Cognitive/Communication Assessment functional  -      Orientation Status oriented x 4  -JM      Follows Commands/Answers Questions 100% of the time  -      Recorded by [JM] Izabela Adair PT      Bed Mobility, Assessment/Treatment    Bed Mobility, Assistive Device  head of bed elevated  -CT     Bed Mobility, Roll Right, [place]  conditional independence  -CT     Bed Mob, Supine to Sit, [place]  conditional independence  -CT     Bed Mob, Sit to Supine, [place]  conditional independence  -CT     Recorded by  [CT] Valdemar Pal PT     Transfer Assessment/Treatment    Transfers, Sit-Stand [place]  supervision required  -CT     Transfers, Sit-Stand-Sit, Assist Device  rolling walker  -CT     Transfer, Maintain Weight Bearing Status  able to maintain weight bearing status  -CT     Transfer, Impairments  impaired balance  -CT     Recorded by  [CT] Valdemar Pal PT     Gait Assessment/Treatment    Gait, [place] Level  contact guard assist  -CT     Gait, Assistive Device  rolling walker  -CT     Gait, Distance (Feet)  120  -CT     Gait, Gait Deviations  elma decreased  -CT     Recorded by  [CT] Valdemar Pal PT     Positioning and Restraints    Pre-Treatment Position in bed  -JM in bed  -CT     Post Treatment Position bed  - bed  -CT     In Bed notified nsg;fowlers;call light within reach;encouraged to call for assist;with family/caregiver;RLE elevated  - notified nsg;sitting;exit alarm on;call light within reach  -CT     Recorded by [JM] Izabela Adair, PT [CT] Valdemar Pal, PT       User Key  (r) = Recorded By, (t) = Taken By, (c) = Cosigned By    Initials Name Effective Dates    TRACY Apple Brink, PT 06/19/15 -      Willard Mcdonald, PT 06/19/15 -     CT Valdemar Molina  Demarco, PT 06/19/15 -     JM Izabela Adair, PT 06/19/15 -                 IP PT Goals       01/09/17 1335 01/09/17 0935 01/07/17 1555    Bed Mobility PT LTG    Bed Mobility PT LTG, Outcome   goal met  -CT    Transfer Training PT LTG    Transfer Training PT LTG, Outcome   goal met  -CT    Gait Training PT LTG    Gait Training Goal PT LTG, Date Goal Reviewed  01/09/17  -TRACY     Gait Training Goal PT LTG, Outcome  goal ongoing  -TRACY     Stair Training PT LTG    Stair Training Goal PT LTG, Date Goal Reviewed  01/09/17  -TRACY     Stair Training Goal PT LTG, Outcome  goal ongoing  -TRACY     Wound Care PT LTG    Wound Care PT LTG 1, Outcome goal partially met  -MF        01/06/17 0815 01/05/17 1659       Bed Mobility PT LTG    Bed Mobility PT LTG, Date Established  01/05/17  -MJ     Bed Mobility PT LTG, Time to Achieve  5 days  -MJ     Bed Mobility PT LTG, Activity Type  supine to sit/sit to supine  -MJ     Bed Mobility PT LTG, [place] Level  independent  -MJ     Transfer Training PT LTG    Transfer Training PT LTG, Date Established  01/05/17  -MJ     Transfer Training PT LTG, Time to Achieve  5 days  -MJ     Transfer Training PT LTG, Activity Type  sit to stand/stand to sit  -MJ     Transfer Training PT LTG, [place] Level  conditional independence  -MJ     Transfer Training PT LTG, Assist Device  walker, rolling  -MJ     Gait Training PT LTG    Gait Training Goal PT LTG, Date Established  01/05/17  -MJ     Gait Training Goal PT LTG, Time to Achieve  5 days  -MJ     Gait Training Goal PT LTG, [place] Level  conditional independence  -MJ     Gait Training Goal PT LTG, Assist Device  walker, rolling  -MJ     Gait Training Goal PT LTG, Distance to Achieve  150 feet  -MJ     Stair Training PT LTG    Stair Training Goal PT LTG, Date Established  01/05/17  -MJ     Stair Training Goal PT LTG, Time to Achieve  5 days  -MJ     Stair Training Goal PT LTG, Number of Steps  1  -MJ     Stair Training Goal PT LTG,  [place] Level  supervision required  -MJ     Stair Training Goal PT LTG, Assist Device  walker, rolling   backward  -MJ     Wound Care PT LTG    Wound Care PT LTG 1, Date Established 01/06/17  -MF      Wound Care PT LTG 1, Time to Achieve other (see comments)   10 days  -MF      Wound Care PT LTG 1, Location R lat ankle wound  -MF      Wound Care PT LTG 1, No S&S of Infection yes  -MF      Wound Care PT LTG 1, Decrease Wound Size 10%  -MF      Wound Care PT LTG 1, Education wound care  -MF      Wound Care PT LTG 1, Education Understanding verbalize understanding  -        User Key  (r) = Recorded By, (t) = Taken By, (c) = Cosigned By    Initials Name Provider Type    TRACY Brink, PT Physical Therapist    MF Willard Mcdonald, PT Physical Therapist    CT Valdemar Pal, PT Physical Therapist    MJ Giovany Bosch, PT Physical Therapist          Physical Therapy Education     Title: PT OT SLP Therapies (Active)     Topic: Physical Therapy (Active)     Point: Mobility training (Done)    Learning Progress Summary    Learner Readiness Method Response Comment Documented by Status   Patient Acceptance EJADE ARNOLDO VILLANUEVA 01/09/17 1024 Done    Acceptance E,D NR  CT 01/07/17 1557 Active    Acceptance E,D VU   01/05/17 1659 Done               Point: Home exercise program (Active)    Learning Progress Summary    Learner Readiness Method Response Comment Documented by Status   Patient Acceptance E,JADE NR  CT 01/07/17 1557 Active               Point: Body mechanics (Done)    Learning Progress Summary    Learner Readiness Method Response Comment Documented by Status   Patient Acceptance JADE TALAVERA DU JB 01/09/17 1024 Done    Acceptance E,D NR  CT 01/07/17 1557 Active    Acceptance E,D KAY   01/05/17 1659 Done               Point: Precautions (Done)    Learning Progress Summary    Learner Readiness Method Response Comment Documented by Status   Patient Acceptance JADE TALAVERA DU JB 01/09/17 1024 Done    Acceptance  E,D NR  CT 01/07/17 1557 Active    Acceptance E,D VU   01/05/17 1659 Done                      User Key     Initials Effective Dates Name Provider Type Discipline    TRACY 06/19/15 -  Apple Brink, PT Physical Therapist PT    CT 06/19/15 -  Valdemar Pal, PT Physical Therapist PT     03/14/16 -  Giovany Bosch, PT Physical Therapist PT                   PT ASSESSMENT (last 72 hours)      PT Evaluation       01/09/17 1335 01/09/17 1149    Rehab Evaluation    Document Type therapy note (daily note);discharge summary  -     Subjective Information agree to therapy;complains of;weakness;fatigue;pain  -MF     Pain Assessment    Pain Assessment Rivera-Baker FACES  -MF     Rivera-Baker FACES Pain Rating 6  -MF     Pain Location Ankle  -MF     Pain Orientation Right  -MF     Pain Intervention(s) Medication (See MAR);Repositioned  -     Muscle Tone Assessment    Muscle Tone Assessment  RLE  -KW    RLE Muscle Tone Assessment  mildly decreased tone  -KW    Sensory Assessment/Intervention    Light Touch  RLE  -KW    RLE Light Touch  mild impairment   tingling in foot  -KW    Positioning and Restraints    Pre-Treatment Position in bed  -     Post Treatment Position bed  -     In Bed supine;call light within reach;with family/caregiver  -       01/09/17 0935 01/09/17 0828    Rehab Evaluation    Document Type therapy note (daily note);progress note  -TRACY     Subjective Information agree to therapy;no complaints  -TRACY     Patient Effort, Rehab Treatment good  -TRACY     Symptoms Noted During/After Treatment none  -TRACY     Pain Assessment    Pain Assessment 0-10  -TRACY     Pain Score 2  -TRACY     Post Pain Score 2  -TRACY     Pain Location Ankle  -TRACY     Pain Orientation Right  -TRACY     Cognitive Assessment/Intervention    Current Cognitive/Communication Assessment functional  -TRACY     Orientation Status oriented x 4  -TRACY     Follows Commands/Answers Questions 100% of the time  -TRACY     Muscle Tone Assessment    Muscle Tone  Assessment  RLE  -KW    RLE Muscle Tone Assessment  mildly decreased tone  -KW    Bed Mobility, Assessment/Treatment    Bed Mobility, Assistive Device head of bed elevated;bed rails  -TRCAY     Bed Mobility, Scoot/Bridge, [place] independent  -TRACY     Bed Mob, Supine to Sit, [place] conditional independence  -TRACY     Bed Mob, Sit to Supine, [place] conditional independence  -TRACY     Transfer Assessment/Treatment    Transfers, Sit-Stand [place] supervision required  -TRACY     Transfers, Stand-Sit [place] supervision required  -TRACY     Transfers, Sit-Stand-Sit, Assist Device rolling walker  -TRACY     Transfer, Maintain Weight Bearing Status able to maintain weight bearing status  -TRACY     Transfer, Safety Issues sequencing ability decreased;weight-shifting ability decreased  -TRACY     Transfer, Comment Pt able to maintain WBing stauts without cues to do so. Pt well aware and demonstrated good safety and balance throughout.  -TRACY     Gait Assessment/Treatment    Gait, [place] Level contact guard assist  -TRACY     Gait, Assistive Device rolling walker  -TRACY     Gait, Distance (Feet) 120  -TRACY     Gait, Gait Deviations elma decreased;step length decreased;weight-shifting ability decreased  -TRACY     Gait, Maintain Weight Bearing Status able to maintain weight bearing status  -TRACY     Gait, Safety Issues step length decreased  -TRACY     Gait, Comment Pt with hop-to gait pattern per weightbearing status. Pt demosntrated good awareness, safety, and balance throughout.  -TRACY     Balance Skills Training    Standing-Level of Assistance Close supervision  -TRACY     Static Standing Balance Support assistive device   Completed several seconds of standing without holding to AD  -TRACY     Positioning and Restraints    Pre-Treatment Position in bed  -TRACY     Post Treatment Position bed  -TRACY     In Bed notified nsg;supine;call light within reach;encouraged to call for assist;exit alarm on;SCD pump applied;RLE elevated;LUE elevated   -TRACY       01/08/17 1000 01/08/17 0306    Rehab Evaluation    Document Type therapy note (daily note)   wound care  -JM     Subjective Information agree to therapy;complains of;pain  -JM     Patient Effort, Rehab Treatment good  -JM     Symptoms Noted During/After Treatment none  -JM     Living Environment    Transportation Available  car;family or friend will provide  -BS    Pain Assessment    Pain Assessment 0-10  -JM     Pain Score 3  -JM     Post Pain Score 3  -JM     Pain Location Ankle  -JM     Pain Orientation Right  -JM     Cognitive Assessment/Intervention    Current Cognitive/Communication Assessment functional  -JM     Orientation Status oriented x 4  -JM     Follows Commands/Answers Questions 100% of the time  -JM     Positioning and Restraints    Pre-Treatment Position in bed  -JM     Post Treatment Position bed  -JM     In Bed notified nsg;fowlers;call light within reach;encouraged to call for assist;RLE elevated  -JM       01/07/17 1500 01/07/17 1435    Rehab Evaluation    Document Type therapy note (daily note)   wound care  -JM therapy note (daily note)  -CT    Subjective Information agree to therapy;complains of;pain   Pt had multiple questions about d/c plans and home vac  - agree to therapy;complains of   fullness in foot  -CT    Patient Effort, Rehab Treatment good  -JM     General Information    Precautions/Limitations fall precautions;non-weight bearing status   NWB RLE  -JM     Pain Assessment    Pain Assessment 0-10  -JM 0-10  -CT    Pain Score 4  -JM 4  -CT    Post Pain Score 4  -JM 4  -CT    Pain Type  Acute pain  -CT    Cognitive Assessment/Intervention    Current Cognitive/Communication Assessment functional  -JM     Orientation Status oriented x 4  -JM     Follows Commands/Answers Questions 100% of the time  -JM     Bed Mobility, Assessment/Treatment    Bed Mobility, Assistive Device  head of bed elevated  -CT    Bed Mobility, Roll Right, [place]  conditional independence  -CT     Bed Mob, Supine to Sit, [place]  conditional independence  -CT    Bed Mob, Sit to Supine, [place]  conditional independence  -CT    Transfer Assessment/Treatment    Transfers, Sit-Stand [place]  supervision required  -CT    Transfers, Sit-Stand-Sit, Assist Device  rolling walker  -CT    Transfer, Maintain Weight Bearing Status  able to maintain weight bearing status  -CT    Transfer, Impairments  impaired balance  -CT    Gait Assessment/Treatment    Gait, [place] Level  contact guard assist  -CT    Gait, Assistive Device  rolling walker  -CT    Gait, Distance (Feet)  120  -CT    Gait, Gait Deviations  elma decreased  -CT    Positioning and Restraints    Pre-Treatment Position in bed  - in bed  -CT    Post Treatment Position bed  - bed  -CT    In Bed notified nsg;fowlers;call light within reach;encouraged to call for assist;with family/caregiver;RLE elevated  - notified nsg;sitting;exit alarm on;call light within reach  -CT      01/07/17 0259       General Information    Equipment Currently Used at Home walker, rolling  -BS     Living Environment    Transportation Available car;family or friend will provide  -BS       User Key  (r) = Recorded By, (t) = Taken By, (c) = Cosigned By    Initials Name Provider Type    TRACY Brink, PT Physical Therapist    MF Willard Mcdonald, PT Physical Therapist    CT Valdemar Pal, PT Physical Therapist    BS Freedom Manning, RN Registered Nurse    KALEB Wren, RN Registered Nurse    WILY Adair, PT Physical Therapist            PT Recommendation and Plan  Anticipated Discharge Disposition: home with assist  Planned Therapy Interventions: balance training, bed mobility training, home exercise program, patient/family education, stair training, strengthening, transfer training  PT Frequency: daily    Plan Of Care Reviewed With: patient   Progress: improving   Outcome Summary/Follow up Plan: wound vac helping to  increase granulation and decrease bioburden. Pt will benefit from cont use of wound vac to help improve healing potential.             Outcome Measures       01/09/17 0935 01/07/17 1435       How much help from another person do you currently need...    Turning from your back to your side while in flat bed without using bedrails? 4  -TRACY 4  -CT     Moving from lying on back to sitting on the side of a flat bed without bedrails? 4  -TRACY 4  -CT     Moving to and from a bed to a chair (including a wheelchair)? 4  -TRACY 4  -CT     Standing up from a chair using your arms (e.g., wheelchair, bedside chair)? 4  -TRACY 4  -CT     Climbing 3-5 steps with a railing? 3  -TRACY 3  -CT     To walk in hospital room? 4  -TRACY 4  -CT     AM-PAC 6 Clicks Score 23  -TRACY 23  -CT     Functional Assessment    Outcome Measure Options AM-PAC 6 Clicks Basic Mobility (PT)  -TRACY AM-PAC 6 Clicks Basic Mobility (PT)  -CT       User Key  (r) = Recorded By, (t) = Taken By, (c) = Cosigned By    Initials Name Provider Type    TRACY Brink, PT Physical Therapist    CT Valdemar Pal, PT Physical Therapist            Time Calculation        PT Charges       01/09/17 1335 01/09/17 1028       Time Calculation    Start Time 1335  - 0935  -TRACY     PT Received On  01/09/17  -     PT Goal Re-Cert Due Date  01/15/17  -     Time Calculation- PT    Total Timed Code Minutes- PT 50 minute(s)  - 24 minute(s)  -       User Key  (r) = Recorded By, (t) = Taken By, (c) = Cosigned By    Initials Name Provider Type    TRACY Brink, PT Physical Therapist     Willard Mcdonald, PT Physical Therapist            Therapy Charges for Today     Code Description Service Date Service Provider Modifiers Qty    08314259452 HC PT NEG PRESS WOUND TO 50SQCM DME1 1/9/2017 Willard Mcdonald, PT  1    81697998122 HC PT THER PROC EA 15 MIN 1/9/2017 Willard Mcdonald, PT GP 1    [id number] HC PT THER SUPP EA 15 MIN 1/9/2017 Willard Mcdonald, PT GP  1            PT G-Codes  Outcome Measure Options: AM-PAC 6 Clicks Basic Mobility (PT)      PT Discharge Summary  Anticipated Discharge Disposition: home with assist        Willard Mcdonald, PT  1/9/2017

## 2017-01-09 NOTE — NURSING NOTE
Instructed patient/friend on self administration of IV antibiotics at home via PICC.  Friend returned demonstration with efficiency.  Discussed PICC care, side effects, and 24hrs RN availability.  Antibiotics and IV supplies given.  Patient is to f/u with Dr. Olson on 1/16/2017 @ 2:15PM with labs prior.

## 2017-01-09 NOTE — PLAN OF CARE
Problem: Patient Care Overview (Adult)  Goal: Plan of Care Review  Outcome: Ongoing (interventions implemented as appropriate)    01/09/17 1335   Coping/Psychosocial Response Interventions   Plan Of Care Reviewed With patient   Outcome Evaluation   Outcome Summary/Follow up Plan wound vac helping to increase granulation and decrease bioburden. Pt will benefit from cont use of wound vac to help improve healing potential.          Problem: Inpatient Physical Therapy  Goal: Wound Care Goal 1 LTG- PT  Outcome: Unable to achieve outcome(s) by discharge Date Met:  01/09/17 01/06/17 0815 01/09/17 1335   Wound Care PT LTG   Wound Care PT LTG 1, Date Established 01/06/17 --    Wound Care PT LTG 1, Time to Achieve other (see comments)  (10 days) --    Wound Care PT LTG 1, Location R lat ankle wound --    Wound Care PT LTG 1, No S&S of Infection yes --    Wound Care PT LTG 1, Decrease Wound Size 10% --    Wound Care PT LTG 1, Education wound care --    Wound Care PT LTG 1, Education Understanding verbalize understanding --    Wound Care PT LTG 1, Outcome --  goal partially met

## 2017-01-09 NOTE — DISCHARGE INSTRUCTIONS
You have a PICC line in place. The dressing was changed 01/08/2017. The dressing is due to be changed again on 01/15/2017. [facility] office will provide IV antibiotics, teaching, PICC line dressing changes, and lab work as needed.     [facility] will provide you with home health. They will contact you within 1-3 days to set up your first appointment. If they do not contact you within this time frame, you may contact them at [phone].     Home infusion is set up through [facility].

## 2017-01-09 NOTE — PLAN OF CARE
Problem: Patient Care Overview (Adult)  Goal: Plan of Care Review  Outcome: Ongoing (interventions implemented as appropriate)    01/09/17 0935   Coping/Psychosocial Response Interventions   Plan Of Care Reviewed With patient   Patient Care Overview   Progress improving   Outcome Evaluation   Outcome Summary/Follow up Plan Pt tolerated session well. Pt demosntrated good awareness. safety, and balance throughout therapy session. Pt declined stair training as she reports she feels comfortable as she reports she has been completing with her walker at home with non-weightbearing status. Pt planned for discharge home today with  therapy. Pt to benefit from cont skilled therapy to address deficits and to reach therapy goals for safe progression toward PLOF.         Problem: Inpatient Physical Therapy  Goal: Gait Training Goal LTG- PT  Outcome: Ongoing (interventions implemented as appropriate)    01/05/17 1659 01/09/17 0935   Gait Training PT LTG   Gait Training Goal PT LTG, Date Established 01/05/17 --    Gait Training Goal PT LTG, Time to Achieve 5 days --    Gait Training Goal PT LTG, [place] Level conditional independence --    Gait Training Goal PT LTG, Assist Device walker, rolling --    Gait Training Goal PT LTG, Distance to Achieve 150 feet --    Gait Training Goal PT LTG, Date Goal Reviewed --  01/09/17   Gait Training Goal PT LTG, Outcome --  goal ongoing       Goal: Stair Training Goal LTG- PT  Outcome: Ongoing (interventions implemented as appropriate)    01/05/17 1659 01/09/17 0935   Stair Training PT LTG   Stair Training Goal PT LTG, Date Established 01/05/17 --    Stair Training Goal PT LTG, Time to Achieve 5 days --    Stair Training Goal PT LTG, Number of Steps 1 --    Stair Training Goal PT LTG, [place] Level supervision required --    Stair Training Goal PT LTG, Assist Device walker, rolling  (backward) --    Stair Training Goal PT LTG, Date Goal Reviewed --  01/09/17   Stair Training Goal  PT LTG, Outcome --  goal ongoing

## 2017-01-09 NOTE — PROGRESS NOTES
Acute Care - Physical Therapy Progress Note  [facility]     Patient Name: Gloria Oropeza  : 1955  MRN: [id number]  Today's Date: 2017  Onset of Illness/Injury or Date of Surgery Date: 17  Date of Referral to PT: 17  Referring Physician: MD Jesus    Admit Date: 2017    Visit Dx:    ICD-10-CM ICD-9-CM   1. Open wound of ankle with complication, left, subsequent encounter S91.002D V58.89     891.1   2. Surgical wound breakdown, subsequent encounter T81.31XD V58.89     998.32   3. Impaired functional mobility, balance, gait, and endurance Z74.09 V49.89   4. S/P right ankle hardware removal with irrigation and debridement of ankle wounds Z98.890 V45.89     Patient Active Problem List   Diagnosis   • Ankle fracture, right   • Closed right ankle fracture   • ORIF of fracture of ankle 10/20/2016   • Hyperlipidemia   • Tobacco abuse   • Surgical wound breakdown   • S/P right ankle hardware removal with irrigation and debridement of ankle wounds               Adult Rehabilitation Note       17 0935 17 1000 17 1500    Rehab Assessment/Intervention    Discipline physical therapist  -TRACY physical therapist  -JM physical therapist  -    Document Type therapy note (daily note);progress note  -TRACY therapy note (daily note)   wound care  - therapy note (daily note)   wound care  -    Subjective Information agree to therapy;no complaints  -TRACY agree to therapy;complains of;pain  -JM agree to therapy;complains of;pain   Pt had multiple questions about d/c plans and home vac  -    Patient Effort, Rehab Treatment good  -TRACY good  -JM good  -JM    Symptoms Noted During/After Treatment none  -TRACY none  -JM     Precautions/Limitations   fall precautions;non-weight bearing status   NWB RLE  -JM    Recorded by [TRACY] Apple Brink, PT [JM] Izabela Adair, PT [JM] Izabela Adair, PT    Pain Assessment    Pain Assessment 0-10  -TRACY 0-10  -JM 0-10  -JM    Pain Score 2   -TRACY 3  -JM 4  -JM    Post Pain Score 2  -TRACY 3  -JM 4  -JM    Pain Location Ankle  -TRACY Ankle  -JM     Pain Orientation Right  -TRACY Right  -JM     Recorded by [TRACY] Apple Brink, PT [JM] Izabela Adair, PT [JM] Izabela Adair, PT    Cognitive Assessment/Intervention    Current Cognitive/Communication Assessment functional  -TRACY functional  -JM functional  -JM    Orientation Status oriented x 4  -TRACY oriented x 4  -JM oriented x 4  -JM    Follows Commands/Answers Questions 100% of the time  -TRACY 100% of the time  -% of the time  -JM    Recorded by [TRACY] Apple Brink, PT [JM] Izabela Adair, PT [JM] Izabela Adair, PT    Bed Mobility, Assessment/Treatment    Bed Mobility, Assistive Device head of bed elevated;bed rails  -TRACY      Bed Mobility, Scoot/Bridge, [place] independent  -TRACY      Bed Mob, Supine to Sit, [place] conditional independence  -TRACY      Bed Mob, Sit to Supine, [place] conditional independence  -TRACY      Recorded by [TRACY] Apple Brink, PT      Transfer Assessment/Treatment    Transfers, Sit-Stand [place] supervision required  -TRACY      Transfers, Stand-Sit [place] supervision required  -TRACY      Transfers, Sit-Stand-Sit, Assist Device rolling walker  -TRACY      Transfer, Maintain Weight Bearing Status able to maintain weight bearing status  -TRACY      Transfer, Safety Issues sequencing ability decreased;weight-shifting ability decreased  -TRACY      Transfer, Comment Pt able to maintain WBing stauts without cues to do so. Pt well aware and demonstrated good safety and balance throughout.  -TRACY      Recorded by [TRACY] Apple Brink, PT      Gait Assessment/Treatment    Gait, [place] Level contact guard assist  -TRACY      Gait, Assistive Device rolling walker  -TRACY      Gait, Distance (Feet) 120  -TRACY      Gait, Gait Deviations elma decreased;step length decreased;weight-shifting ability decreased  -TRACY      Gait, Maintain Weight  Bearing Status able to maintain weight bearing status  -TRACY      Gait, Safety Issues step length decreased  -TRACY      Gait, Comment Pt with hop-to gait pattern per weightbearing status. Pt demosntrated good awareness, safety, and balance throughout.  -TRACY      Recorded by [TRACY] Apple Brink, PT      Balance Skills Training    Standing-Level of Assistance Close supervision  -TRACY      Static Standing Balance Support assistive device   Completed several seconds of standing without holding to AD  -TRACY      Recorded by [TRACY] Apple Brink, PT      Positioning and Restraints    Pre-Treatment Position in bed  -TRACY in bed  - in bed  -    Post Treatment Position bed  -TRACY bed  - bed  -JM    In Bed notified nsg;supine;call light within reach;encouraged to call for assist;exit alarm on;SCD pump applied;RLE elevated;LUE elevated  -TRACY notified nsg;fowlers;call light within reach;encouraged to call for assist;RLE elevated  - notified nsg;fowlers;call light within reach;encouraged to call for assist;with family/caregiver;RLE elevated  -    Recorded by [TRACY] Apple Brink, PT [JM] Izabela Adair, PT [JM] Izabela Adair, PT      01/07/17 1435          Rehab Assessment/Intervention    Discipline physical therapist  -CT      Document Type therapy note (daily note)  -CT      Subjective Information agree to therapy;complains of   fullness in foot  -CT      Recorded by [CT] Valdemar Pal, PT      Pain Assessment    Pain Assessment 0-10  -CT      Pain Score 4  -CT      Post Pain Score 4  -CT      Pain Type Acute pain  -CT      Recorded by [CT] Valdemar Pal, PT      Bed Mobility, Assessment/Treatment    Bed Mobility, Assistive Device head of bed elevated  -CT      Bed Mobility, Roll Right, [place] conditional independence  -CT      Bed Mob, Supine to Sit, [facility] conditional independence  -CT      Bed Mob, Sit to Supine, [place] conditional independence  -CT       Recorded by [CT] Valdemar Pal PT      Transfer Assessment/Treatment    Transfers, Sit-Stand [place] supervision required  -CT      Transfers, Sit-Stand-Sit, Assist Device rolling walker  -CT      Transfer, Maintain Weight Bearing Status able to maintain weight bearing status  -CT      Transfer, Impairments impaired balance  -CT      Recorded by [CT] Valdemar Pal PT      Gait Assessment/Treatment    Gait, [place] Level contact guard assist  -CT      Gait, Assistive Device rolling walker  -CT      Gait, Distance (Feet) 120  -CT      Gait, Gait Deviations elma decreased  -CT      Recorded by [CT] Valdemar Pal PT      Positioning and Restraints    Pre-Treatment Position in bed  -CT      Post Treatment Position bed  -CT      In Bed notified nsg;sitting;exit alarm on;call light within reach  -CT      Recorded by [CT] Valdemar Pal PT        User Key  (r) = Recorded By, (t) = Taken By, (c) = Cosigned By    Initials Name Effective Dates    TRACY Brink, PT 06/19/15 -     CT Valdemar Pal, PT 06/19/15 -     JM Izabela Adair, PT 06/19/15 -                 IP PT Goals       01/09/17 0935 01/07/17 1555 01/06/17 0815    Bed Mobility PT LTG    Bed Mobility PT LTG, Outcome  goal met  -CT     Transfer Training PT LTG    Transfer Training PT LTG, Outcome  goal met  -CT     Gait Training PT LTG    Gait Training Goal PT LTG, Date Goal Reviewed 01/09/17  -TRACY      Gait Training Goal PT LTG, Outcome goal ongoing  -TRACY      Stair Training PT LTG    Stair Training Goal PT LTG, Date Goal Reviewed 01/09/17  -TRACY      Stair Training Goal PT LTG, Outcome goal ongoing  -TRACY      Wound Care PT LTG    Wound Care PT LTG 1, Date Established   01/06/17  -MF    Wound Care PT LTG 1, Time to Achieve   other (see comments)   10 days  -MF    Wound Care PT LTG 1, Location   R lat ankle wound  -MF    Wound Care PT LTG 1, No S&S of Infection   yes  -MF    Wound Care PT LTG 1,  Decrease Wound Size   10%  -    Wound Care PT LTG 1, Education   wound care  -    Wound Care PT LTG 1, Education Understanding   verbalize understanding  -      01/05/17 1659          Bed Mobility PT LTG    Bed Mobility PT LTG, Date Established 01/05/17  -MJ      Bed Mobility PT LTG, Time to Achieve 5 days  -MJ      Bed Mobility PT LTG, Activity Type supine to sit/sit to supine  -MJ      Bed Mobility PT LTG, [place] Level independent  -MJ      Transfer Training PT LTG    Transfer Training PT LTG, Date Established 01/05/17  -MJ      Transfer Training PT LTG, Time to Achieve 5 days  -MJ      Transfer Training PT LTG, Activity Type sit to stand/stand to sit  -MJ      Transfer Training PT LTG, [place] Level conditional independence  -MJ      Transfer Training PT LTG, Assist Device walker, rolling  -MJ      Gait Training PT LTG    Gait Training Goal PT LTG, Date Established 01/05/17  -MJ      Gait Training Goal PT LTG, Time to Achieve 5 days  -MJ      Gait Training Goal PT LTG, [place] Level conditional independence  -MJ      Gait Training Goal PT LTG, Assist Device walker, rolling  -MJ      Gait Training Goal PT LTG, Distance to Achieve 150 feet  -MJ      Stair Training PT LTG    Stair Training Goal PT LTG, Date Established 01/05/17  -MJ      Stair Training Goal PT LTG, Time to Achieve 5 days  -MJ      Stair Training Goal PT LTG, Number of Steps 1  -MJ      Stair Training Goal PT LTG, [place] Level supervision required  -MJ      Stair Training Goal PT LTG, Assist Device walker, rolling   backward  -MJ        User Key  (r) = Recorded By, (t) = Taken By, (c) = Cosigned By    Initials Name Provider Type    TRACY Brink, PT Physical Therapist    MF Willard Mcdonald, PT Physical Therapist    BEULAH Pal, PT Physical Therapist    MJ Giovany Bosch, PT Physical Therapist          Physical Therapy Education     Title: PT OT SLP Therapies (Active)     Topic: Physical Therapy  (Active)     Point: Mobility training (Done)    Learning Progress Summary    Learner Readiness Method Response Comment Documented by Status   Patient Acceptance JADE TALAVERA DU  TRACY 01/09/17 1024 Done    Acceptance E,D NR  CT 01/07/17 1557 Active    Acceptance E,D VU   01/05/17 1659 Done               Point: Home exercise program (Active)    Learning Progress Summary    Learner Readiness Method Response Comment Documented by Status   Patient Acceptance E,D NR  CT 01/07/17 1557 Active               Point: Body mechanics (Done)    Learning Progress Summary    Learner Readiness Method Response Comment Documented by Status   Patient Acceptance SADAF,JADE VILLANUEVA,DU  TRACY 01/09/17 1024 Done    Acceptance E,D NR  CT 01/07/17 1557 Active    Acceptance E,D VU   01/05/17 1659 Done               Point: Precautions (Done)    Learning Progress Summary    Learner Readiness Method Response Comment Documented by Status   Patient Acceptance JADE TALAVERA,ARNOLDO  TRACY 01/09/17 1024 Done    Acceptance E,D NR  CT 01/07/17 1557 Active    Acceptance E,D VU   01/05/17 1659 Done                      User Key     Initials Effective Dates Name Provider Type Discipline    TRACY 06/19/15 -  Apple Brink, PT Physical Therapist PT    CT 06/19/15 -  Valdemar Pal, PT Physical Therapist PT     03/14/16 -  Giovany Bosch, PT Physical Therapist PT                    PT Recommendation and Plan  Anticipated Discharge Disposition: home with assist  Planned Therapy Interventions: balance training, bed mobility training, home exercise program, patient/family education, stair training, strengthening, transfer training  PT Frequency: daily  Plan of Care Review  Plan Of Care Reviewed With: patient  Progress: improving  Outcome Summary/Follow up Plan: Pt tolerated session well. Pt demosntrated good awareness. safety, and balance throughout therapy session. Pt declined stair training as she reports she feels comfortable as she has been complting with her walker at  home with non-weightbearing status. Pt planned for discharge home today with  therapy. Pt to benefit from cont skilled therapy to address deficits and to reach therapy goals for safe progression toward PLOF.          Outcome Measures       01/09/17 0935 01/07/17 1435       How much help from another person do you currently need...    Turning from your back to your side while in flat bed without using bedrails? 4  -TRACY 4  -CT     Moving from lying on back to sitting on the side of a flat bed without bedrails? 4  -TRACY 4  -CT     Moving to and from a bed to a chair (including a wheelchair)? 4  -TRACY 4  -CT     Standing up from a chair using your arms (e.g., wheelchair, bedside chair)? 4  -TRACY 4  -CT     Climbing 3-5 steps with a railing? 3  -TRACY 3  -CT     To walk in hospital room? 4  -TRACY 4  -CT     AM-PAC 6 Clicks Score 23  -TRACY 23  -CT     Functional Assessment    Outcome Measure Options AM-PAC 6 Clicks Basic Mobility (PT)  -TRACY AM-PAC 6 Clicks Basic Mobility (PT)  -CT       User Key  (r) = Recorded By, (t) = Taken By, (c) = Cosigned By    Initials Name Provider Type    TRACY Brink PT Physical Therapist    CT Valdemar Pal, PT Physical Therapist           Time Calculation:         PT Charges       01/09/17 1028          Time Calculation    Start Time 0935  -TRACY      PT Received On 01/09/17  -      PT Goal Re-Cert Due Date 01/15/17  -      Time Calculation- PT    Total Timed Code Minutes- PT 24 minute(s)  -        User Key  (r) = Recorded By, (t) = Taken By, (c) = Cosigned By    Initials Name Provider Type    TRACY Brink PT Physical Therapist          Therapy Charges for Today     Code Description Service Date Service Provider Modifiers Qty    57422821531 HC GAIT TRAINING EA 15 MIN 1/9/2017 Apple Brink, PT GP 1    45416149217 HC PT THER PROC EA 15 MIN 1/9/2017 Apple Brink PT GP 1    97255513467 HC PT THER SUPP EA 15 MIN 1/9/2017 Apple Martinez  [place], PT GP 2          PT G-Codes  Outcome Measure Options: AM-PAC 6 Clicks Basic Mobility (PT)    Apple Brink, PT  1/9/2017

## 2017-01-09 NOTE — PROGRESS NOTES
Discharge Planning Assessment  [facility]     Patient Name: Gloria Oropeza  MRN: [id number]  Today's Date: 1/9/2017    Admit Date: 1/5/2017          Discharge Needs Assessment     None            Discharge Plan       01/09/17 1517    Case Management/Social Work Plan    Additional Comments Ashleigh from [facility], [phone],  has called to accept pt for 3X weekly wound vac dressing changes.  Outpt appointment has been canceled.  Pt is in agreement with this plan.    Final Note    Final Note Pt to be discharged home with HH from [facility], [phone], and home infusion with [facility].      01/09/17 1505    Case Management/Social Work Plan    Additional Comments CM has spoken with Ashleigh from  [facility]  multiple times today but she has been unable or unwilling to commit to accept pt for wound vac dressing changes.  This is the only HH in pts area that could accept pt.  This was explained to pt who verbalized understanding.  Pt has been set up with an outpt wound vac dressing change through [facility] PT for 1/11/17 at 1430 with Willard.  Her IV infusion, labs and PICC care has been arrange with [facility].  Pt is in agreement with this plan.        Discharge Placement     No information found        Expected Discharge Date and Time     Expected Discharge Date Expected Discharge Time    Jan 9, 2017               Demographic Summary     None            Functional Status     None            Psychosocial     None            Abuse/Neglect     None            Legal     None            Substance Abuse     None            Patient Forms     None          Ruth Kaur, RN

## 2017-01-09 NOTE — PLAN OF CARE
Problem: Patient Care Overview (Adult)  Goal: Plan of Care Review  Outcome: Outcome(s) achieved Date Met:  01/09/17 01/09/17 1557   Coping/Psychosocial Response Interventions   Plan Of Care Reviewed With patient   Patient Care Overview   Progress improving   Outcome Evaluation   Outcome Summary/Follow up Plan Pt tolerates ambulation with assist X 1 and a walker without bearing weight on her RLE. Pain is improved with prn pain meds. Vitals are WNL.          Problem: Perioperative Period (Adult)  Goal: Signs and Symptoms of Listed Potential Problems Will be Absent or Manageable (Perioperative Period)  Outcome: Outcome(s) achieved Date Met:  01/09/17 01/09/17 1557   Perioperative Period   Problems Assessed (Perioperative Period) all   Problems Present (Perioperative Period) pain;infection         Problem: Orthopaedic Fracture (Adult)  Goal: Signs and Symptoms of Listed Potential Problems Will be Absent or Manageable (Orthopaedic Fracture)  Outcome: Outcome(s) achieved Date Met:  01/09/17 01/09/17 1557   Orthopaedic Fracture   Problems Assessed (Orthopaedic Fracture) all   Problems Present (Orthopaedic Fracture) functional deficit/ self-care deficit;pain;skin integrity impairment

## 2017-01-09 NOTE — PROGRESS NOTES
Discharge Planning Assessment  [facility]     Patient Name: Gloria Oropeza  MRN: [id number]  Today's Date: 1/9/2017    Admit Date: 1/5/2017          Discharge Needs Assessment     None            Discharge Plan       01/09/17 1505    Case Management/Social Work Plan    Additional Comments CM has spoken with Ashleigh from  [facility] multiple times today but she has been unable or unwilling to commit to accept pt for wound vac dressing changes.  This is the only  in pts area that could accept pt.  This was explained to pt who verbalized understanding.  Pt has been set up with an outpt wound vac dressing change through [facility] PT for 1/11/17 at 1430 with [place]ard.  Her IV infusion, labs and PICC care has been arrange with [facility].  Pt is in agreement with this plan.        Discharge Placement     No information found        Expected Discharge Date and Time     Expected Discharge Date Expected Discharge Time    Jan 9, 2017               Demographic Summary     None            Functional Status     None            Psychosocial     None            Abuse/Neglect     None            Legal     None            Substance Abuse     None            Patient Forms     None          Ruth Kaur RN

## 2017-01-09 NOTE — PROGRESS NOTES
[facility] Progress Note    Admission Date: 1/5/2017    Gloria Oropeza  1955  [id number]    Date: 1/9/2017    Antibiotics:  IV Anti-Infectives     Ordered     Dose/Rate Route Frequency Start Stop    01/09/17 0900  DAPTOmycin 500 mg in sodium chloride 0.9 % 50 mL     Ordering Provider:  SALVATORE Null    500 mg  100 mL/hr over 30 Minutes Intravenous Every 24 Hours 01/09/17 0000      01/09/17 0900  cefTRIAXone (ROCEPHIN) 40 MG/ML IVPB     Ordering Provider:  SALVATORE Null    2 g  over 30 Minutes Intravenous Every 24 Hours 01/09/17 0000      01/06/17 1852  DAPTOmycin (CUBICIN) 500 mg in sodium chloride 0.9 % 50 mL IVPB     Ordering Provider:  Tine Hinton MD    500 mg  100 mL/hr over 30 Minutes Intravenous Every 24 Hours 01/06/17 2000      01/05/17 1607  cefTRIAXone (ROCEPHIN) IVPB 2 g     Ordering Provider:  Tien Hinton MD    2 g  over 30 Minutes Intravenous Every 24 Hours 01/06/17 1400      01/05/17 1353  cefTRIAXone (ROCEPHIN) 2 g in sodium chloride 0.9 % 100 mL IVPB     Ordering Provider:  Cristina Lee MD    2 g  over 30 Minutes Intravenous Once 01/05/17 1430 01/05/17 1444    01/05/17 1353  vancomycin (VANCOCIN) IVPB 1 g (premix) in Dextrose 5% 200 mL     Ordering Provider:  Cristina Lee MD    1,000 mg Intravenous Once 01/05/17 1430 01/05/17 1453             CC:  Right ankle osteomyelitis    HPI:  Patient is a very pleasant 61 y.o. Yr old female with a history of right open reduction internal fixation of a bimalleolar fracture (jumped off a 4 garvey) on 10/20/16. She had an unremarkable postoperative course and was healing well. However patient did continue to smoke. Cast was removed in December 2016. Patient then developed some exposure of hardware over the ensuing weeks and did not seek medical attention. Was informed by a friend that despite seeing the metal on the outside of her ankle that it was not likely infected. Patient was evaluated by Dr. Lee who is now taking  the patient to the operating room for hardware removal. Patient has not had a high fevers or chills. She has no other localizing signs or symptoms of infection. I was consulted by  on 1/5/17 for further evaluation and treatment. Patient was admitted to [facility] on 1/5/17. 1/6/17  history reviewed.  Kameron abx.  Min right foot pain.  1/9/17 history reviewed.  Tolerating antibiotics.  No muscle pains and pain control.  Corynebacterium isolated from surgery.       ROS:  No f/c/s. No n/v/d. No rash. No new ADR to Abx.     Constitutional-- No Fever, chills or sweats.  Appetite good, and no malaise. No fatigue.  Heent-- No new vision, hearing or throat complaints.  No epistaxis or oral sores.  Denies odynophagia or dysphagia.  No odynophagia or dysphagia. No headache, photophobia or neck stiffness.  CV-- No chest pain, palpitation or syncope  Resp-- No SOB/cough/Hemoptysis  GI- No nausea, vomiting, or diarrhea.  No hematochezia, melena, or hematemesis. Denies jaundice or chronic liver disease.  -- No dysuria, hematuria, or flank pain.  Denies hesitancy, urgency or flank pain.  Lymph- no swollen lymph nodes in neck/axilla or groin.   Heme- No active bruising or bleeding; no Hx of DVT or PE.  MS-- no swelling or pain in the bones or joints of arms/legs.  No new back pain.  Right foot pain.  Neuro-- No acute focal weakness or numbness in the arms or legs.  No seizures.  Skin--No rash, nodules, blisters.    Objective   PE:  Vital Signs  Temp  Min: 97.3 °F (36.3 °C)  Max: 98.4 °F (36.9 °C)  BP  Min: 90/56  Max: 108/56  Pulse  Min: 72  Max: 82  Resp  Min: 16  Max: 18  SpO2  Min: 95 %  Max: 97 %    GENERAL: Awake and alert, in minimal distress.   HEENT: Normocephalic, atraumatic.  PERRL. EOMI. No conjunctival injection.  Oropharynx clear without evidence of thrush or exudate. No evidence of peridontal disease.    NECK: Supple without nuchal rigidity. No mass.  LYMPH: No cervical, axillary or  inguinal lymphadenopathy.  HEART: RRR; No murmur, rubs, gallops.  No JVD.  LUNGS: Clear to auscultation bilaterally without wheezing, rales, rhonchi. Normal respiratory effort. Nonlabored. No dullness.  ABDOMEN: Soft, nontender, nondistended. Positive bowel sounds. No rebound or guarding. NO mass or HSM.  EXT:  No cyanosis, clubbing or edema. No cord.  MSK: FROM without joint effusions noted arms/legs.    SKIN: Warm and dry without cutaneous eruptions on Inspection/palpation.  Surg dressing and wound vac on right ankle.  NEURO: Oriented to name. No focal deficits on motor/sensory exam at arms/legs.    Laboratory Data      Results from last 7 days  Lab Units 01/06/17  0518 01/05/17  1705   WBC 10*3/mm3 9.76 5.66   HEMOGLOBIN g/dL 11.6 13.2   HEMATOCRIT % 34.5 39.1   PLATELETS 10*3/mm3 210 225       Results from last 7 days  Lab Units 01/06/17  0518   SODIUM mmol/L 135   POTASSIUM mmol/L 4.5   CHLORIDE mmol/L 105   TOTAL CO2 mmol/L 25.0   BUN mg/dL 7*   CREATININE mg/dL 0.60   GLUCOSE mg/dL 148*   CALCIUM mg/dL 9.2       Results from last 7 days  Lab Units 01/06/17  0518   ALK PHOS U/L 66   BILIRUBIN mg/dL 0.2*   ALT (SGPT) U/L 14   AST (SGOT) U/L 18       Results from last 7 days  Lab Units 01/06/17  0518   SED RATE mm/hr 11       Results from last 7 days  Lab Units 01/05/17  1705   CRP mg/dL 4.90       Results from last 7 days  Lab Units 01/06/17  0518 01/05/17  1705   CK TOTAL U/L 60 75             Estimated Creatinine Clearance: 85.8 mL/min (by C-G formula based on Cr of 0.6).      Microbiology:  Right ankle cx 1/5/17 neg to date    Radiology:  Imaging Results (last 72 hours)     ** No results found for the last 72 hours. **          I personally reviewed the radiographic studies     Assessment/Plan   IMPRESSION:     1. Right ankle prosthetic device infection with possible tracking to the bone with osteomyelitis given the recent ORIF on 10/10/16, with exposed hardware. Usual organisms are related to skin quinten  including staph and strep versus other. No evidence of abscess or necrotizing fasciitis.  Corynebacterium isolated.  Skin quinten.  2. Right bimalleolar fracture with ORIF repair 10/20/16.  3. Ongoing smoking, which may adversely affect healing of wounds including her right ankle.     Plan:     1. Diagnostically, continue to follow patient's physical exam, CBC, CMP, CRP, ESR, cultures from the right ankle for routine culture and sensitivity, AFB, fungus, and also check CPK.  This will be continued as an outpatient.  2. Therapeutically, daptomycin 500 mg IV daily, and Rocephin 2 g IV daily. She is likely to need 4-6 weeks of IV antibiotics until 2/15/17.  This is being arranged for as an outpatient.  3. PICC line for long-term venous access.    Increased risk for adverse drug reactions, readmission, need for further surgery, pathologic fracture.  Discussed with case management and family.  Okay for discharge to next level of care from an infection standpoint.  Signs and symptoms of infection and side effects of the antibiotics discussed with the patient.    Tien Hinton MD  1/9/2017

## 2017-01-10 NOTE — THERAPY DISCHARGE NOTE
Acute Care - Physical Therapy Discharge Summary  [facility]       Patient Name: Gloria Oropeza  : 1955  MRN: [id number]    Today's Date: 1/10/2017  Onset of Illness/Injury or Date of Surgery Date: 17    Date of Referral to PT: 17  Referring Physician: MD Jesus      Admit Date: 2017      PT Recommendation and Plan    Visit Dx:    ICD-10-CM ICD-9-CM   1. Open wound of ankle with complication, left, subsequent encounter S91.002D V58.89     891.1   2. Surgical wound breakdown, subsequent encounter T81.31XD V58.89     998.32   3. Impaired functional mobility, balance, gait, and endurance Z74.09 V49.89   4. S/P right ankle hardware removal with irrigation and debridement of ankle wounds Z98.890 V45.89             Outcome Measures       17 0935 17 1435       How much help from another person do you currently need...    Turning from your back to your side while in flat bed without using bedrails? 4  -TRACY 4  -CT     Moving from lying on back to sitting on the side of a flat bed without bedrails? 4  -TRACY 4  -CT     Moving to and from a bed to a chair (including a wheelchair)? 4  -TRACY 4  -CT     Standing up from a chair using your arms (e.g., wheelchair, bedside chair)? 4  -TRACY 4  -CT     Climbing 3-5 steps with a railing? 3  -TRACY 3  -CT     To walk in hospital room? 4  -TRACY 4  -CT     AM-PAC 6 Clicks Score 23  -TRACY 23  -CT     Functional Assessment    Outcome Measure Options AM-PAC 6 Clicks Basic Mobility (PT)  -TRACY AM-PAC 6 Clicks Basic Mobility (PT)  -CT       User Key  (r) = Recorded By, (t) = Taken By, (c) = Cosigned By    Initials Name Provider Type    TRACY Brink, PT Physical Therapist    CT Valdemar Pal, PT Physical Therapist                      IP PT Goals       17 1335 17 0935 17 1555    Bed Mobility PT LTG    Bed Mobility PT LTG, Outcome   goal met  -CT    Transfer Training PT LTG    Transfer Training PT LTG, Outcome   goal met  -CT     Gait Training PT LTG    Gait Training Goal PT LTG, Date Goal Reviewed  01/09/17  -TRACY     Gait Training Goal PT LTG, Outcome  goal ongoing  -TRACY     Stair Training PT LTG    Stair Training Goal PT LTG, Date Goal Reviewed  01/09/17  -TRACY     Stair Training Goal PT LTG, Outcome  goal ongoing  -TRACY     Wound Care PT LTG    Wound Care PT LTG 1, Outcome goal partially met  -        01/06/17 0815 01/05/17 1659       Bed Mobility PT LTG    Bed Mobility PT LTG, Date Established  01/05/17  -MJ     Bed Mobility PT LTG, Time to Achieve  5 days  -MJ     Bed Mobility PT LTG, Activity Type  supine to sit/sit to supine  -MJ     Bed Mobility PT LTG, [place] Level  independent  -MJ     Transfer Training PT LTG    Transfer Training PT LTG, Date Established  01/05/17  -MJ     Transfer Training PT LTG, Time to Achieve  5 days  -MJ     Transfer Training PT LTG, Activity Type  sit to stand/stand to sit  -MJ     Transfer Training PT LTG, [place] Level  conditional independence  -MJ     Transfer Training PT LTG, Assist Device  walker, rolling  -MJ     Gait Training PT LTG    Gait Training Goal PT LTG, Date Established  01/05/17  -MJ     Gait Training Goal PT LTG, Time to Achieve  5 days  -MJ     Gait Training Goal PT LTG, [place] Level  conditional independence  -MJ     Gait Training Goal PT LTG, Assist Device  walker, rolling  -MJ     Gait Training Goal PT LTG, Distance to Achieve  150 feet  -MJ     Stair Training PT LTG    Stair Training Goal PT LTG, Date Established  01/05/17  -MJ     Stair Training Goal PT LTG, Time to Achieve  5 days  -MJ     Stair Training Goal PT LTG, Number of Steps  1  -MJ     Stair Training Goal PT LTG, [place] Level  supervision required  -MJ     Stair Training Goal PT LTG, Assist Device  walker, rolling   backward  -MJ     Wound Care PT LTG    Wound Care PT LTG 1, Date Established 01/06/17  -      Wound Care PT LTG 1, Time to Achieve other (see comments)   10 days  -MF      Wound Care PT  LTG 1, Location R lat ankle wound  -      Wound Care PT LTG 1, No S&S of Infection yes  -MF      Wound Care PT LTG 1, Decrease Wound Size 10%  -      Wound Care PT LTG 1, Education wound care  -      Wound Care PT LTG 1, Education Understanding verbalize understanding  -        User Key  (r) = Recorded By, (t) = Taken By, (c) = Cosigned By    Initials Name Provider Type    TRACY Brink, PT Physical Therapist    MARIS Mcdonald, PT Physical Therapist    BEULAH Pal, PT Physical Therapist    DAMARIS Bosch, PT Physical Therapist           Goals Status: Treatment plan discontinued secondary to discharge from acute facility.      PT Discharge Summary  Anticipated Discharge Disposition: home with assist  Reason for Discharge: Discharge from facility  Discharge Destination: Home with home health      Latosha Matthews, PT   1/10/2017

## 2017-01-11 ENCOUNTER — APPOINTMENT (OUTPATIENT)
Dept: PHYSICAL THERAPY | Facility: HOSPITAL | Age: 62
End: 2017-01-11

## 2017-01-12 LAB — BACTERIA SPEC ANAEROBE CULT: NORMAL

## 2017-01-16 ENCOUNTER — LAB (OUTPATIENT)
Dept: LAB | Facility: HOSPITAL | Age: 62
End: 2017-01-16

## 2017-01-16 DIAGNOSIS — T84.69XA INFLAMMATORY REACTION DUE TO INTERNAL FIXATION DEVICE OF OTHER SITE, INITIAL ENCOUNTER (HCC): Primary | ICD-10-CM

## 2017-01-16 LAB
ALBUMIN SERPL-MCNC: 4.4 G/DL (ref 3.2–4.8)
ALBUMIN/GLOB SERPL: 1.6 G/DL (ref 1.5–2.5)
ALP SERPL-CCNC: 100 U/L (ref 25–100)
ALT SERPL W P-5'-P-CCNC: 56 U/L (ref 7–40)
ANION GAP SERPL CALCULATED.3IONS-SCNC: 11 MMOL/L (ref 3–11)
AST SERPL-CCNC: 38 U/L (ref 0–33)
BASOPHILS # BLD AUTO: 0.04 10*3/MM3 (ref 0–0.2)
BASOPHILS NFR BLD AUTO: 0.6 % (ref 0–1)
BILIRUB SERPL-MCNC: 0.2 MG/DL (ref 0.3–1.2)
BUN BLD-MCNC: 7 MG/DL (ref 9–23)
BUN/CREAT SERPL: 10 (ref 7–25)
CALCIUM SPEC-SCNC: 10.1 MG/DL (ref 8.7–10.4)
CHLORIDE SERPL-SCNC: 104 MMOL/L (ref 99–109)
CK SERPL-CCNC: 52 U/L (ref 26–174)
CO2 SERPL-SCNC: 32 MMOL/L (ref 20–31)
CREAT BLD-MCNC: 0.7 MG/DL (ref 0.6–1.3)
CRP SERPL-MCNC: 21.3 MG/DL (ref 0–10)
DEPRECATED RDW RBC AUTO: 43.9 FL (ref 37–54)
EOSINOPHIL # BLD AUTO: 0.45 10*3/MM3 (ref 0.1–0.3)
EOSINOPHIL NFR BLD AUTO: 6.2 % (ref 0–3)
ERYTHROCYTE [DISTWIDTH] IN BLOOD BY AUTOMATED COUNT: 13.3 % (ref 11.3–14.5)
ERYTHROCYTE [SEDIMENTATION RATE] IN BLOOD: 45 MM/HR (ref 0–30)
GFR SERPL CREATININE-BSD FRML MDRD: 85 ML/MIN/1.73
GLOBULIN UR ELPH-MCNC: 2.8 GM/DL
GLUCOSE BLD-MCNC: 97 MG/DL (ref 70–100)
HCT VFR BLD AUTO: 39.6 % (ref 34.5–44)
HGB BLD-MCNC: 12.6 G/DL (ref 11.5–15.5)
IMM GRANULOCYTES # BLD: 0.02 10*3/MM3 (ref 0–0.03)
IMM GRANULOCYTES NFR BLD: 0.3 % (ref 0–0.6)
LYMPHOCYTES # BLD AUTO: 2.36 10*3/MM3 (ref 0.6–4.8)
LYMPHOCYTES NFR BLD AUTO: 32.6 % (ref 24–44)
MCH RBC QN AUTO: 29 PG (ref 27–31)
MCHC RBC AUTO-ENTMCNC: 31.8 G/DL (ref 32–36)
MCV RBC AUTO: 91.2 FL (ref 80–99)
MONOCYTES # BLD AUTO: 0.57 10*3/MM3 (ref 0–1)
MONOCYTES NFR BLD AUTO: 7.9 % (ref 0–12)
NEUTROPHILS # BLD AUTO: 3.81 10*3/MM3 (ref 1.5–8.3)
NEUTROPHILS NFR BLD AUTO: 52.4 % (ref 41–71)
PLATELET # BLD AUTO: 199 10*3/MM3 (ref 150–450)
PMV BLD AUTO: 9.8 FL (ref 6–12)
POTASSIUM BLD-SCNC: 4.9 MMOL/L (ref 3.5–5.5)
PROT SERPL-MCNC: 7.2 G/DL (ref 5.7–8.2)
RBC # BLD AUTO: 4.34 10*6/MM3 (ref 3.89–5.14)
SODIUM BLD-SCNC: 147 MMOL/L (ref 132–146)
WBC NRBC COR # BLD: 7.25 10*3/MM3 (ref 3.5–10.8)

## 2017-01-16 PROCEDURE — 80053 COMPREHEN METABOLIC PANEL: CPT | Performed by: INTERNAL MEDICINE

## 2017-01-16 PROCEDURE — 85025 COMPLETE CBC W/AUTO DIFF WBC: CPT | Performed by: INTERNAL MEDICINE

## 2017-01-16 PROCEDURE — 85652 RBC SED RATE AUTOMATED: CPT | Performed by: INTERNAL MEDICINE

## 2017-01-16 PROCEDURE — 86140 C-REACTIVE PROTEIN: CPT | Performed by: INTERNAL MEDICINE

## 2017-01-16 PROCEDURE — 82550 ASSAY OF CK (CPK): CPT | Performed by: INTERNAL MEDICINE

## 2017-01-16 PROCEDURE — 36415 COLL VENOUS BLD VENIPUNCTURE: CPT

## 2017-01-23 ENCOUNTER — LAB (OUTPATIENT)
Dept: LAB | Facility: HOSPITAL | Age: 62
End: 2017-01-23

## 2017-01-23 ENCOUNTER — TRANSCRIBE ORDERS (OUTPATIENT)
Dept: LAB | Facility: HOSPITAL | Age: 62
End: 2017-01-23

## 2017-01-23 DIAGNOSIS — F17.210 CIGARETTE SMOKER: ICD-10-CM

## 2017-01-23 DIAGNOSIS — J05.10 ACUTE BACTERIAL EPIGLOTTITIS: ICD-10-CM

## 2017-01-23 DIAGNOSIS — B96.89 ACUTE BACTERIAL EPIGLOTTITIS: ICD-10-CM

## 2017-01-23 DIAGNOSIS — M86.171 ACUTE OSTEOMYELITIS OF RIGHT ANKLE OR FOOT (HCC): ICD-10-CM

## 2017-01-23 DIAGNOSIS — L03.115 CELLULITIS OF RIGHT FOOT: ICD-10-CM

## 2017-01-23 DIAGNOSIS — T84.69XD INFLAMMATORY REACTION DUE TO INTERNAL FIXATION DEVICE OF OTHER SITE, SUBSEQUENT ENCOUNTER: Primary | ICD-10-CM

## 2017-01-23 DIAGNOSIS — T84.69XD INFLAMMATORY REACTION DUE TO INTERNAL FIXATION DEVICE OF OTHER SITE, SUBSEQUENT ENCOUNTER: ICD-10-CM

## 2017-01-23 LAB
ALBUMIN SERPL-MCNC: 4.5 G/DL (ref 3.2–4.8)
ALBUMIN/GLOB SERPL: 1.7 G/DL (ref 1.5–2.5)
ALP SERPL-CCNC: 85 U/L (ref 25–100)
ALT SERPL W P-5'-P-CCNC: 44 U/L (ref 7–40)
ANION GAP SERPL CALCULATED.3IONS-SCNC: 13 MMOL/L (ref 3–11)
AST SERPL-CCNC: 37 U/L (ref 0–33)
BASOPHILS # BLD AUTO: 0.03 10*3/MM3 (ref 0–0.2)
BASOPHILS NFR BLD AUTO: 0.4 % (ref 0–1)
BILIRUB SERPL-MCNC: 0.2 MG/DL (ref 0.3–1.2)
BUN BLD-MCNC: 8 MG/DL (ref 9–23)
BUN/CREAT SERPL: 11.4 (ref 7–25)
CALCIUM SPEC-SCNC: 10.1 MG/DL (ref 8.7–10.4)
CHLORIDE SERPL-SCNC: 97 MMOL/L (ref 99–109)
CK SERPL-CCNC: 62 U/L (ref 26–174)
CO2 SERPL-SCNC: 31 MMOL/L (ref 20–31)
CREAT BLD-MCNC: 0.7 MG/DL (ref 0.6–1.3)
CRP SERPL-MCNC: 7.6 MG/DL (ref 0–10)
DEPRECATED RDW RBC AUTO: 44 FL (ref 37–54)
EOSINOPHIL # BLD AUTO: 0.23 10*3/MM3 (ref 0.1–0.3)
EOSINOPHIL NFR BLD AUTO: 3.4 % (ref 0–3)
ERYTHROCYTE [DISTWIDTH] IN BLOOD BY AUTOMATED COUNT: 13.3 % (ref 11.3–14.5)
ERYTHROCYTE [SEDIMENTATION RATE] IN BLOOD: 22 MM/HR (ref 0–30)
GFR SERPL CREATININE-BSD FRML MDRD: 85 ML/MIN/1.73
GLOBULIN UR ELPH-MCNC: 2.7 GM/DL
GLUCOSE BLD-MCNC: 88 MG/DL (ref 70–100)
HCT VFR BLD AUTO: 40.7 % (ref 34.5–44)
HGB BLD-MCNC: 13.3 G/DL (ref 11.5–15.5)
IMM GRANULOCYTES # BLD: 0.01 10*3/MM3 (ref 0–0.03)
IMM GRANULOCYTES NFR BLD: 0.1 % (ref 0–0.6)
LYMPHOCYTES # BLD AUTO: 2.46 10*3/MM3 (ref 0.6–4.8)
LYMPHOCYTES NFR BLD AUTO: 36.4 % (ref 24–44)
MCH RBC QN AUTO: 29.7 PG (ref 27–31)
MCHC RBC AUTO-ENTMCNC: 32.7 G/DL (ref 32–36)
MCV RBC AUTO: 90.8 FL (ref 80–99)
MONOCYTES # BLD AUTO: 0.45 10*3/MM3 (ref 0–1)
MONOCYTES NFR BLD AUTO: 6.7 % (ref 0–12)
NEUTROPHILS # BLD AUTO: 3.58 10*3/MM3 (ref 1.5–8.3)
NEUTROPHILS NFR BLD AUTO: 53 % (ref 41–71)
PLATELET # BLD AUTO: 249 10*3/MM3 (ref 150–450)
PMV BLD AUTO: 9.7 FL (ref 6–12)
POTASSIUM BLD-SCNC: 3.9 MMOL/L (ref 3.5–5.5)
PROT SERPL-MCNC: 7.2 G/DL (ref 5.7–8.2)
RBC # BLD AUTO: 4.48 10*6/MM3 (ref 3.89–5.14)
SODIUM BLD-SCNC: 141 MMOL/L (ref 132–146)
WBC NRBC COR # BLD: 6.76 10*3/MM3 (ref 3.5–10.8)

## 2017-01-23 PROCEDURE — 85025 COMPLETE CBC W/AUTO DIFF WBC: CPT | Performed by: INTERNAL MEDICINE

## 2017-01-23 PROCEDURE — 85652 RBC SED RATE AUTOMATED: CPT | Performed by: INTERNAL MEDICINE

## 2017-01-23 PROCEDURE — 86140 C-REACTIVE PROTEIN: CPT | Performed by: INTERNAL MEDICINE

## 2017-01-23 PROCEDURE — 82550 ASSAY OF CK (CPK): CPT | Performed by: INTERNAL MEDICINE

## 2017-01-23 PROCEDURE — 36415 COLL VENOUS BLD VENIPUNCTURE: CPT | Performed by: INTERNAL MEDICINE

## 2017-01-23 PROCEDURE — 80053 COMPREHEN METABOLIC PANEL: CPT | Performed by: INTERNAL MEDICINE

## 2017-01-30 ENCOUNTER — LAB (OUTPATIENT)
Dept: LAB | Facility: HOSPITAL | Age: 62
End: 2017-01-30

## 2017-01-30 ENCOUNTER — TRANSCRIBE ORDERS (OUTPATIENT)
Dept: LAB | Facility: HOSPITAL | Age: 62
End: 2017-01-30

## 2017-01-30 DIAGNOSIS — M86.171 ACUTE OSTEOMYELITIS OF RIGHT ANKLE OR FOOT (HCC): ICD-10-CM

## 2017-01-30 DIAGNOSIS — L03.115 CELLULITIS OF RIGHT FOOT: ICD-10-CM

## 2017-01-30 DIAGNOSIS — M86.171 ACUTE OSTEOMYELITIS OF RIGHT ANKLE OR FOOT (HCC): Primary | ICD-10-CM

## 2017-01-30 LAB
ALBUMIN SERPL-MCNC: 4.3 G/DL (ref 3.2–4.8)
ALBUMIN/GLOB SERPL: 1.6 G/DL (ref 1.5–2.5)
ALP SERPL-CCNC: 76 U/L (ref 25–100)
ALT SERPL W P-5'-P-CCNC: 24 U/L (ref 7–40)
ANION GAP SERPL CALCULATED.3IONS-SCNC: 9 MMOL/L (ref 3–11)
AST SERPL-CCNC: 27 U/L (ref 0–33)
BASOPHILS # BLD AUTO: 0.04 10*3/MM3 (ref 0–0.2)
BASOPHILS NFR BLD AUTO: 0.6 % (ref 0–1)
BILIRUB SERPL-MCNC: 0.3 MG/DL (ref 0.3–1.2)
BUN BLD-MCNC: 7 MG/DL (ref 9–23)
BUN/CREAT SERPL: 10 (ref 7–25)
CALCIUM SPEC-SCNC: 9.8 MG/DL (ref 8.7–10.4)
CHLORIDE SERPL-SCNC: 102 MMOL/L (ref 99–109)
CK SERPL-CCNC: 56 U/L (ref 26–174)
CO2 SERPL-SCNC: 28 MMOL/L (ref 20–31)
CREAT BLD-MCNC: 0.7 MG/DL (ref 0.6–1.3)
CRP SERPL-MCNC: 7.5 MG/DL (ref 0–10)
DEPRECATED RDW RBC AUTO: 45.7 FL (ref 37–54)
EOSINOPHIL # BLD AUTO: 0.25 10*3/MM3 (ref 0.1–0.3)
EOSINOPHIL NFR BLD AUTO: 3.6 % (ref 0–3)
ERYTHROCYTE [DISTWIDTH] IN BLOOD BY AUTOMATED COUNT: 13.5 % (ref 11.3–14.5)
ERYTHROCYTE [SEDIMENTATION RATE] IN BLOOD: 21 MM/HR (ref 0–30)
GFR SERPL CREATININE-BSD FRML MDRD: 85 ML/MIN/1.73
GLOBULIN UR ELPH-MCNC: 2.7 GM/DL
GLUCOSE BLD-MCNC: 88 MG/DL (ref 70–100)
HCT VFR BLD AUTO: 40.7 % (ref 34.5–44)
HGB BLD-MCNC: 13.2 G/DL (ref 11.5–15.5)
IMM GRANULOCYTES # BLD: 0.02 10*3/MM3 (ref 0–0.03)
IMM GRANULOCYTES NFR BLD: 0.3 % (ref 0–0.6)
LYMPHOCYTES # BLD AUTO: 2.68 10*3/MM3 (ref 0.6–4.8)
LYMPHOCYTES NFR BLD AUTO: 38.5 % (ref 24–44)
MCH RBC QN AUTO: 29.9 PG (ref 27–31)
MCHC RBC AUTO-ENTMCNC: 32.4 G/DL (ref 32–36)
MCV RBC AUTO: 92.3 FL (ref 80–99)
MONOCYTES # BLD AUTO: 0.48 10*3/MM3 (ref 0–1)
MONOCYTES NFR BLD AUTO: 6.9 % (ref 0–12)
NEUTROPHILS # BLD AUTO: 3.49 10*3/MM3 (ref 1.5–8.3)
NEUTROPHILS NFR BLD AUTO: 50.1 % (ref 41–71)
PLATELET # BLD AUTO: 258 10*3/MM3 (ref 150–450)
PMV BLD AUTO: 9.9 FL (ref 6–12)
POTASSIUM BLD-SCNC: 4.6 MMOL/L (ref 3.5–5.5)
PROT SERPL-MCNC: 7 G/DL (ref 5.7–8.2)
RBC # BLD AUTO: 4.41 10*6/MM3 (ref 3.89–5.14)
SODIUM BLD-SCNC: 139 MMOL/L (ref 132–146)
WBC NRBC COR # BLD: 6.96 10*3/MM3 (ref 3.5–10.8)

## 2017-01-30 PROCEDURE — 85025 COMPLETE CBC W/AUTO DIFF WBC: CPT | Performed by: INTERNAL MEDICINE

## 2017-01-30 PROCEDURE — 85652 RBC SED RATE AUTOMATED: CPT | Performed by: INTERNAL MEDICINE

## 2017-01-30 PROCEDURE — 86140 C-REACTIVE PROTEIN: CPT | Performed by: INTERNAL MEDICINE

## 2017-01-30 PROCEDURE — 82550 ASSAY OF CK (CPK): CPT | Performed by: INTERNAL MEDICINE

## 2017-01-30 PROCEDURE — 80053 COMPREHEN METABOLIC PANEL: CPT | Performed by: INTERNAL MEDICINE

## 2017-01-30 PROCEDURE — 36415 COLL VENOUS BLD VENIPUNCTURE: CPT | Performed by: INTERNAL MEDICINE

## 2017-02-06 ENCOUNTER — LAB (OUTPATIENT)
Dept: LAB | Facility: HOSPITAL | Age: 62
End: 2017-02-06
Attending: INTERNAL MEDICINE

## 2017-02-06 DIAGNOSIS — M86.171 ACUTE OSTEOMYELITIS OF RIGHT ANKLE OR FOOT (HCC): ICD-10-CM

## 2017-02-06 DIAGNOSIS — T84.69XD INFLAMMATORY REACTION DUE TO INTERNAL FIXATION DEVICE OF OTHER SITE, SUBSEQUENT ENCOUNTER: Primary | ICD-10-CM

## 2017-02-06 LAB
ALBUMIN SERPL-MCNC: 4.4 G/DL (ref 3.2–4.8)
ALBUMIN/GLOB SERPL: 1.6 G/DL (ref 1.5–2.5)
ALP SERPL-CCNC: 74 U/L (ref 25–100)
ALT SERPL W P-5'-P-CCNC: 18 U/L (ref 7–40)
ANION GAP SERPL CALCULATED.3IONS-SCNC: 8 MMOL/L (ref 3–11)
AST SERPL-CCNC: 24 U/L (ref 0–33)
BASOPHILS # BLD AUTO: 0.03 10*3/MM3 (ref 0–0.2)
BASOPHILS NFR BLD AUTO: 0.5 % (ref 0–1)
BILIRUB SERPL-MCNC: 0.2 MG/DL (ref 0.3–1.2)
BUN BLD-MCNC: 8 MG/DL (ref 9–23)
BUN/CREAT SERPL: 10 (ref 7–25)
CALCIUM SPEC-SCNC: 9.9 MG/DL (ref 8.7–10.4)
CHLORIDE SERPL-SCNC: 100 MMOL/L (ref 99–109)
CK SERPL-CCNC: 52 U/L (ref 26–174)
CO2 SERPL-SCNC: 31 MMOL/L (ref 20–31)
CREAT BLD-MCNC: 0.8 MG/DL (ref 0.6–1.3)
CRP SERPL-MCNC: 4.6 MG/DL (ref 0–10)
DEPRECATED RDW RBC AUTO: 43.8 FL (ref 37–54)
EOSINOPHIL # BLD AUTO: 0.26 10*3/MM3 (ref 0.1–0.3)
EOSINOPHIL NFR BLD AUTO: 4 % (ref 0–3)
ERYTHROCYTE [DISTWIDTH] IN BLOOD BY AUTOMATED COUNT: 13.2 % (ref 11.3–14.5)
ERYTHROCYTE [SEDIMENTATION RATE] IN BLOOD: 22 MM/HR (ref 0–30)
GFR SERPL CREATININE-BSD FRML MDRD: 73 ML/MIN/1.73
GLOBULIN UR ELPH-MCNC: 2.7 GM/DL
GLUCOSE BLD-MCNC: 84 MG/DL (ref 70–100)
HCT VFR BLD AUTO: 40.7 % (ref 34.5–44)
HGB BLD-MCNC: 13.4 G/DL (ref 11.5–15.5)
IMM GRANULOCYTES # BLD: 0.01 10*3/MM3 (ref 0–0.03)
IMM GRANULOCYTES NFR BLD: 0.2 % (ref 0–0.6)
LYMPHOCYTES # BLD AUTO: 2.92 10*3/MM3 (ref 0.6–4.8)
LYMPHOCYTES NFR BLD AUTO: 44.6 % (ref 24–44)
MCH RBC QN AUTO: 30 PG (ref 27–31)
MCHC RBC AUTO-ENTMCNC: 32.9 G/DL (ref 32–36)
MCV RBC AUTO: 91.3 FL (ref 80–99)
MONOCYTES # BLD AUTO: 0.5 10*3/MM3 (ref 0–1)
MONOCYTES NFR BLD AUTO: 7.6 % (ref 0–12)
NEUTROPHILS # BLD AUTO: 2.83 10*3/MM3 (ref 1.5–8.3)
NEUTROPHILS NFR BLD AUTO: 43.1 % (ref 41–71)
PLATELET # BLD AUTO: 214 10*3/MM3 (ref 150–450)
PMV BLD AUTO: 9.8 FL (ref 6–12)
POTASSIUM BLD-SCNC: 4.1 MMOL/L (ref 3.5–5.5)
PROT SERPL-MCNC: 7.1 G/DL (ref 5.7–8.2)
RBC # BLD AUTO: 4.46 10*6/MM3 (ref 3.89–5.14)
SODIUM BLD-SCNC: 139 MMOL/L (ref 132–146)
WBC NRBC COR # BLD: 6.55 10*3/MM3 (ref 3.5–10.8)

## 2017-02-06 PROCEDURE — 36415 COLL VENOUS BLD VENIPUNCTURE: CPT

## 2017-02-06 PROCEDURE — 80053 COMPREHEN METABOLIC PANEL: CPT | Performed by: INTERNAL MEDICINE

## 2017-02-06 PROCEDURE — 85652 RBC SED RATE AUTOMATED: CPT | Performed by: INTERNAL MEDICINE

## 2017-02-06 PROCEDURE — 86140 C-REACTIVE PROTEIN: CPT | Performed by: INTERNAL MEDICINE

## 2017-02-06 PROCEDURE — 82550 ASSAY OF CK (CPK): CPT | Performed by: INTERNAL MEDICINE

## 2017-02-06 PROCEDURE — 85025 COMPLETE CBC W/AUTO DIFF WBC: CPT | Performed by: INTERNAL MEDICINE

## 2017-02-13 ENCOUNTER — TRANSCRIBE ORDERS (OUTPATIENT)
Dept: LAB | Facility: HOSPITAL | Age: 62
End: 2017-02-13

## 2017-02-13 ENCOUNTER — LAB (OUTPATIENT)
Dept: LAB | Facility: HOSPITAL | Age: 62
End: 2017-02-13

## 2017-02-13 DIAGNOSIS — T84.69XD INFLAMMATORY REACTION DUE TO INTERNAL FIXATION DEVICE OF OTHER SITE, SUBSEQUENT ENCOUNTER: Primary | ICD-10-CM

## 2017-02-13 DIAGNOSIS — T84.69XD INFLAMMATORY REACTION DUE TO INTERNAL FIXATION DEVICE OF OTHER SITE, SUBSEQUENT ENCOUNTER: ICD-10-CM

## 2017-02-13 LAB
ALBUMIN SERPL-MCNC: 4.5 G/DL (ref 3.2–4.8)
ALBUMIN/GLOB SERPL: 1.7 G/DL (ref 1.5–2.5)
ALP SERPL-CCNC: 69 U/L (ref 25–100)
ALT SERPL W P-5'-P-CCNC: 19 U/L (ref 7–40)
ANION GAP SERPL CALCULATED.3IONS-SCNC: 6 MMOL/L (ref 3–11)
AST SERPL-CCNC: 25 U/L (ref 0–33)
BASOPHILS # BLD AUTO: 0.01 10*3/MM3 (ref 0–0.2)
BASOPHILS NFR BLD AUTO: 0.1 % (ref 0–1)
BILIRUB SERPL-MCNC: 0.4 MG/DL (ref 0.3–1.2)
BUN BLD-MCNC: 6 MG/DL (ref 9–23)
BUN/CREAT SERPL: 8.6 (ref 7–25)
CALCIUM SPEC-SCNC: 10.2 MG/DL (ref 8.7–10.4)
CHLORIDE SERPL-SCNC: 99 MMOL/L (ref 99–109)
CK SERPL-CCNC: 94 U/L (ref 26–174)
CO2 SERPL-SCNC: 32 MMOL/L (ref 20–31)
CREAT BLD-MCNC: 0.7 MG/DL (ref 0.6–1.3)
CRP SERPL-MCNC: 1.1 MG/DL (ref 0–10)
DEPRECATED RDW RBC AUTO: 42.7 FL (ref 37–54)
EOSINOPHIL # BLD AUTO: 0.07 10*3/MM3 (ref 0.1–0.3)
EOSINOPHIL NFR BLD AUTO: 0.9 % (ref 0–3)
ERYTHROCYTE [DISTWIDTH] IN BLOOD BY AUTOMATED COUNT: 13 % (ref 11.3–14.5)
ERYTHROCYTE [SEDIMENTATION RATE] IN BLOOD: 24 MM/HR (ref 0–30)
GFR SERPL CREATININE-BSD FRML MDRD: 85 ML/MIN/1.73
GLOBULIN UR ELPH-MCNC: 2.7 GM/DL
GLUCOSE BLD-MCNC: 109 MG/DL (ref 70–100)
HCT VFR BLD AUTO: 39.1 % (ref 34.5–44)
HGB BLD-MCNC: 13.1 G/DL (ref 11.5–15.5)
IMM GRANULOCYTES # BLD: 0.02 10*3/MM3 (ref 0–0.03)
IMM GRANULOCYTES NFR BLD: 0.3 % (ref 0–0.6)
LYMPHOCYTES # BLD AUTO: 2.18 10*3/MM3 (ref 0.6–4.8)
LYMPHOCYTES NFR BLD AUTO: 29.4 % (ref 24–44)
MCH RBC QN AUTO: 30 PG (ref 27–31)
MCHC RBC AUTO-ENTMCNC: 33.5 G/DL (ref 32–36)
MCV RBC AUTO: 89.7 FL (ref 80–99)
MONOCYTES # BLD AUTO: 0.5 10*3/MM3 (ref 0–1)
MONOCYTES NFR BLD AUTO: 6.7 % (ref 0–12)
NEUTROPHILS # BLD AUTO: 4.63 10*3/MM3 (ref 1.5–8.3)
NEUTROPHILS NFR BLD AUTO: 62.6 % (ref 41–71)
PLATELET # BLD AUTO: 234 10*3/MM3 (ref 150–450)
PMV BLD AUTO: 9.8 FL (ref 6–12)
POTASSIUM BLD-SCNC: 4.1 MMOL/L (ref 3.5–5.5)
PROT SERPL-MCNC: 7.2 G/DL (ref 5.7–8.2)
RBC # BLD AUTO: 4.36 10*6/MM3 (ref 3.89–5.14)
SODIUM BLD-SCNC: 137 MMOL/L (ref 132–146)
WBC NRBC COR # BLD: 7.41 10*3/MM3 (ref 3.5–10.8)

## 2017-02-13 PROCEDURE — 85652 RBC SED RATE AUTOMATED: CPT | Performed by: INTERNAL MEDICINE

## 2017-02-13 PROCEDURE — 85025 COMPLETE CBC W/AUTO DIFF WBC: CPT | Performed by: INTERNAL MEDICINE

## 2017-02-13 PROCEDURE — 36415 COLL VENOUS BLD VENIPUNCTURE: CPT | Performed by: INTERNAL MEDICINE

## 2017-02-13 PROCEDURE — 80053 COMPREHEN METABOLIC PANEL: CPT | Performed by: INTERNAL MEDICINE

## 2017-02-13 PROCEDURE — 82550 ASSAY OF CK (CPK): CPT | Performed by: INTERNAL MEDICINE

## 2017-02-13 PROCEDURE — 86140 C-REACTIVE PROTEIN: CPT | Performed by: INTERNAL MEDICINE

## 2017-02-16 LAB
CHYMOTRYP AB SER-ACNC: NORMAL
FUNGUS WND CULT: NORMAL
NIGHT BLUE STAIN TISS: NORMAL

## 2017-04-12 RX ORDER — DULOXETIN HYDROCHLORIDE 30 MG/1
30 CAPSULE, DELAYED RELEASE ORAL DAILY
COMMUNITY

## 2017-04-17 ENCOUNTER — OFFICE VISIT (OUTPATIENT)
Dept: ORTHOPEDIC SURGERY | Facility: CLINIC | Age: 62
End: 2017-04-17

## 2017-04-17 VITALS
HEIGHT: 62 IN | BODY MASS INDEX: 23 KG/M2 | DIASTOLIC BLOOD PRESSURE: 78 MMHG | HEART RATE: 88 BPM | WEIGHT: 125 LBS | SYSTOLIC BLOOD PRESSURE: 129 MMHG

## 2017-04-17 DIAGNOSIS — Z87.39 HISTORY OF OSTEOMYELITIS: ICD-10-CM

## 2017-04-17 DIAGNOSIS — Z09 S/P ORTHOPEDIC SURGERY, FOLLOW-UP EXAM: Primary | ICD-10-CM

## 2017-04-17 DIAGNOSIS — Z87.81 HISTORY OF FRACTURE OF RIGHT ANKLE: ICD-10-CM

## 2017-04-17 PROCEDURE — 99212 OFFICE O/P EST SF 10 MIN: CPT | Performed by: ORTHOPAEDIC SURGERY

## 2017-04-17 NOTE — PROGRESS NOTES
"Post-op (15 weeks Right ankle hardware removal with I&D of ankle wounds)      Gloria Oropeza is {1-10:46884} {days weeks months years:09234} status post ***. She reports no fever, chills.  She reports pain is {DESC; CONTROLLED POOR-RESOLVED:30267}.  They have been taking {trend:05451} for DVT prophylaxis.  They have been {Weight bearing restriction:49354}.      Past Surgical History:   Procedure Laterality Date   • ANKLE OPEN REDUCTION INTERNAL FIXATION Right 10/20/2016    Procedure: RIGHT ANKLE OPEN REDUCTION INTERNAL FIXATION;  Surgeon: Cristina Lee MD;  Location:  [facility];  Service:    • BREAST LUMPECTOMY Right    • COLONOSCOPY      2004 approx   • HARDWARE REMOVAL Right 1/5/2017    Procedure: RIGHT ANKLE HARDWARE REMOVAL WITH IRRIGATION AND DEBRIDEMENT OF ANKLE WOUNDS;  Surgeon: Cristina Lee MD;  Location:  [facility];  Service:    • MOUTH SURGERY         Review of Systems    /78  Pulse 88  Ht 62\" (157.5 cm)  Wt 125 lb (56.7 kg)  BMI 22.86 kg/m2       Incision  healing well, dry and intact. No erythema, no drainage, no sign of infection, normal post op swelling. ***    Assessment and Plan:  The primary encounter diagnosis was S/P orthopedic surgery, follow-up exam. Diagnoses of History of osteomyelitis and History of fracture of right ankle were also pertinent to this visit.     ***        "

## 2017-04-17 NOTE — PROGRESS NOTES
"Post-op (15 weeks Right ankle hardware removal with I&D of ankle wounds)      Gloria Oropeza is {1-10:86910} {days weeks months years:68587} status post ***. She reports no fever, chills.  She reports pain is {DESC; CONTROLLED POOR-RESOLVED:20660}.  They have been taking {trend:31860} for DVT prophylaxis.  They have been {Weight bearing restriction:95539}.      Past Surgical History:   Procedure Laterality Date   • ANKLE OPEN REDUCTION INTERNAL FIXATION Right 10/20/2016    Procedure: RIGHT ANKLE OPEN REDUCTION INTERNAL FIXATION;  Surgeon: Cristina Lee MD;  Location:  [facility];  Service:    • BREAST LUMPECTOMY Right    • COLONOSCOPY      2004 approx   • HARDWARE REMOVAL Right 1/5/2017    Procedure: RIGHT ANKLE HARDWARE REMOVAL WITH IRRIGATION AND DEBRIDEMENT OF ANKLE WOUNDS;  Surgeon: Cristina Lee MD;  Location:  [facility];  Service:    • MOUTH SURGERY         /78  Pulse 88  Ht 62\" (157.5 cm)  Wt 125 lb (56.7 kg)  BMI 22.86 kg/m2       Incision  healing well, dry and intact. No erythema, no drainage, no sign of infection, normal post op swelling. ***    Assessment and Plan:  There were no encounter diagnoses.     ***    "

## 2017-04-17 NOTE — PROGRESS NOTES
"Post-op (15 weeks Right ankle hardware removal with I&D of ankle wounds)      Gloria Oropeza is 15 week status post hardware removal and I&D ankle wounds. She reports no fever, chills.  She reports pain is resolved.  She still complains of dysesthesias on the dorsum or the foot and calf.  Working on desensitization on her own.  They have been taking off meds now for DVT prophylaxis.  They have been weight bearing as tolerated.      Past Surgical History:   Procedure Laterality Date   • ANKLE OPEN REDUCTION INTERNAL FIXATION Right 10/20/2016    Procedure: RIGHT ANKLE OPEN REDUCTION INTERNAL FIXATION;  Surgeon: Cristina Lee MD;  Location:  [facility];  Service:    • BREAST LUMPECTOMY Right    • COLONOSCOPY      2004 approx   • HARDWARE REMOVAL Right 1/5/2017    Procedure: RIGHT ANKLE HARDWARE REMOVAL WITH IRRIGATION AND DEBRIDEMENT OF ANKLE WOUNDS;  Surgeon: Cristina Lee MD;  Location:  [facility];  Service:    • MOUTH SURGERY         Review of Systems   Constitutional: Negative.    HENT: Negative.    Eyes: Negative.    Respiratory: Negative.    Cardiovascular: Negative.    Gastrointestinal: Negative.    Endocrine: Negative.    Genitourinary: Negative.    Musculoskeletal: Positive for arthralgias.   Skin: Negative.    Allergic/Immunologic: Negative.    Neurological: Positive for numbness.   Hematological: Negative.    Psychiatric/Behavioral: Negative.        /78  Pulse 88  Ht 62\" (157.5 cm)  Wt 125 lb (56.7 kg)  BMI 22.86 kg/m2     Right foot/ankle exam:  Incisions now healed.  Small scab on medial incision.  No erythema, warmth.  Mild swelling as expected.  Dysesthesia over dorsum of foot.   Sensation and motor function intact.  She is using a cane for ambulation.    Assessment and Plan:  The primary encounter diagnosis was S/P orthopedic surgery, follow-up exam. Diagnoses of History of osteomyelitis and History of fracture of right ankle were also pertinent to this visit.     Doing well.  Her " incisions are now healed.  She has one small scab left on the medial incision.   I would like her to begin some formal PT working on ROM and desensitization.  She may get on her riding .  I do not want her soaking the ankle or tub bathing until the scab has fallen off.  She will return in 6 weeks or sooner if needed.  I have given her a rx for PT.    Scribed for Cristina Lee MD by Pricilla Bah PA-C. 4/17/2017  4:00 PM        I, Cristina Lee MD, personally performed the services described in this documentation as scribed by the above named individual in my presence, and it is both accurate and complete.  4/17/2017  5:21 PM

## 2017-07-17 ENCOUNTER — OFFICE VISIT (OUTPATIENT)
Dept: ORTHOPEDIC SURGERY | Facility: CLINIC | Age: 62
End: 2017-07-17

## 2017-07-17 DIAGNOSIS — S82.891S ANKLE FRACTURE, RIGHT, SEQUELA: Primary | ICD-10-CM

## 2017-07-17 PROCEDURE — 99212 OFFICE O/P EST SF 10 MIN: CPT | Performed by: ORTHOPAEDIC SURGERY

## 2017-07-17 RX ORDER — GABAPENTIN 600 MG/1
TABLET ORAL
COMMUNITY
Start: 2017-06-05

## 2017-07-17 RX ORDER — NICOTINE 21 MG/24HR
PATCH, TRANSDERMAL 24 HOURS TRANSDERMAL
COMMUNITY
Start: 2017-06-05 | End: 2017-08-16

## 2017-07-17 NOTE — PROGRESS NOTES
ESTABLISHED PATIENT    Patient: Gloria Oropeza  : 1955    Primary Care Provider: Murali Scott MD    Requesting Provider: As above    Follow-up of the Right Ankle (S/P RIGHT ANKLE HARDWARE REMOVAL WITH IRRIGATION AND DEBRIDEMENT OF ANKLE WOUNDS 2017)      History    Chief Complaint: right ankle fracture    History of Present Illness: She is finally healed, s/p ankle fracture complicated by wound breakdown.  All hardware is out.  She reports much less pain    Current Outpatient Prescriptions on File Prior to Visit   Medication Sig Dispense Refill   • ALPRAZolam (XANAX) 0.25 MG tablet Take 0.25 mg by mouth 2 (Two) Times a Day As Needed for anxiety.     • DULoxetine (CYMBALTA) 30 MG capsule Take 30 mg by mouth Daily.     • gabapentin (NEURONTIN) 300 MG capsule Take 2 capsules by mouth Every 8 (Eight) Hours. 60 capsule 0   • HYDROcodone-acetaminophen (NORCO)  MG per tablet Take 1 tablet by mouth Every 6 (Six) Hours As Needed for moderate pain (4-6).       No current facility-administered medications on file prior to visit.       Allergies   Allergen Reactions   • Sulfa Antibiotics Nausea And Vomiting      Past Medical History:   Diagnosis Date   • Arthritis    • Breast cancer    • Fracture     right ankle   • Hyperlipidemia    • Wears eyeglasses    • Wears partial dentures     upper     Past Surgical History:   Procedure Laterality Date   • ANKLE OPEN REDUCTION INTERNAL FIXATION Right 10/20/2016    Procedure: RIGHT ANKLE OPEN REDUCTION INTERNAL FIXATION;  Surgeon: Cristina Lee MD;  Location: [facility] OR;  Service:    • BREAST LUMPECTOMY Right    • COLONOSCOPY       approx   • HARDWARE REMOVAL Right 2017    Procedure: RIGHT ANKLE HARDWARE REMOVAL WITH IRRIGATION AND DEBRIDEMENT OF ANKLE WOUNDS;  Surgeon: Cristina Lee MD;  Location:  [facility];  Service:    • MOUTH SURGERY       Family History   Problem Relation Age of Onset   • Cancer Father       Social History     Social History    • Marital status:      Spouse name: N/A   • Number of children: N/A   • Years of education: N/A     Occupational History   • Not on file.     Social History Main Topics   • Smoking status: Current Every Day Smoker     Types: Electronic Cigarette   • Smokeless tobacco: Never Used   • Alcohol use No   • Drug use: No   • Sexual activity: Defer     Other Topics Concern   • Not on file     Social History Narrative        Review of Systems   Constitutional: Negative.    HENT: Negative.    Eyes: Negative.    Respiratory: Negative.    Cardiovascular: Negative.    Gastrointestinal: Negative.    Endocrine: Negative.    Musculoskeletal: Positive for arthralgias.   Skin:        Adherent scar   Allergic/Immunologic: Negative.    Neurological: Negative.    Hematological: Negative.    Psychiatric/Behavioral: Negative.        The following portions of the patient's history were reviewed and updated as appropriate: allergies, current medications, past family history, past medical history, past social history, past surgical history and problem list.    Physical Exam:   There were no vitals taken for this visit.  GENERAL: Body habitus: normal weight for height    Lower extremity edema: Left: none; Right: none    Gait: normal     Mental Status:  awake and alert; oriented to person, place, and time  MSK:  [place]:  Right:  non tender; Left:  non tender        Ankle:  Right:ankle has 10 deg dorsiflex, 30 plantar, 5 inv and ev, incisions healed, no sign of infection, scars are somewhat adherent  Medical Decision Making    Data Review:   none    Assessment/Plan/Diagnosis/Treatment Options:   She is doing very well after a difficult problem. We talked about scar massage.  I will be happy to see her any time.

## 2017-08-16 ENCOUNTER — LAB (OUTPATIENT)
Dept: LAB | Facility: HOSPITAL | Age: 62
End: 2017-08-16

## 2017-08-16 ENCOUNTER — TELEPHONE (OUTPATIENT)
Dept: FAMILY MEDICINE CLINIC | Facility: CLINIC | Age: 62
End: 2017-08-16

## 2017-08-16 ENCOUNTER — OFFICE VISIT (OUTPATIENT)
Dept: FAMILY MEDICINE CLINIC | Facility: CLINIC | Age: 62
End: 2017-08-16

## 2017-08-16 VITALS
WEIGHT: 133 LBS | HEIGHT: 62 IN | BODY MASS INDEX: 24.48 KG/M2 | HEART RATE: 76 BPM | DIASTOLIC BLOOD PRESSURE: 80 MMHG | OXYGEN SATURATION: 96 % | SYSTOLIC BLOOD PRESSURE: 128 MMHG

## 2017-08-16 DIAGNOSIS — E55.9 VITAMIN D DEFICIENCY: ICD-10-CM

## 2017-08-16 DIAGNOSIS — E78.2 MIXED HYPERLIPIDEMIA: ICD-10-CM

## 2017-08-16 DIAGNOSIS — Z00.00 ANNUAL PHYSICAL EXAM: Primary | ICD-10-CM

## 2017-08-16 DIAGNOSIS — Z00.00 ANNUAL PHYSICAL EXAM: ICD-10-CM

## 2017-08-16 DIAGNOSIS — L57.0 ACTINIC KERATOSIS: ICD-10-CM

## 2017-08-16 LAB
25(OH)D3 SERPL-MCNC: 22.1 NG/ML
ALBUMIN SERPL-MCNC: 4.6 G/DL (ref 3.2–4.8)
ALBUMIN/GLOB SERPL: 1.5 G/DL (ref 1.5–2.5)
ALP SERPL-CCNC: 119 U/L (ref 25–100)
ALT SERPL W P-5'-P-CCNC: 27 U/L (ref 7–40)
ANION GAP SERPL CALCULATED.3IONS-SCNC: 4 MMOL/L (ref 3–11)
ARTICHOKE IGE QN: 223 MG/DL (ref 0–130)
AST SERPL-CCNC: 30 U/L (ref 0–33)
BILIRUB SERPL-MCNC: 0.3 MG/DL (ref 0.3–1.2)
BUN BLD-MCNC: 9 MG/DL (ref 9–23)
BUN/CREAT SERPL: 11.3 (ref 7–25)
CALCIUM SPEC-SCNC: 9.9 MG/DL (ref 8.7–10.4)
CHLORIDE SERPL-SCNC: 104 MMOL/L (ref 99–109)
CHOLEST SERPL-MCNC: 273 MG/DL (ref 0–200)
CO2 SERPL-SCNC: 31 MMOL/L (ref 20–31)
CREAT BLD-MCNC: 0.8 MG/DL (ref 0.6–1.3)
GFR SERPL CREATININE-BSD FRML MDRD: 73 ML/MIN/1.73
GLOBULIN UR ELPH-MCNC: 3 GM/DL
GLUCOSE BLD-MCNC: 103 MG/DL (ref 70–100)
HCV AB SER DONR QL: NORMAL
HDLC SERPL-MCNC: 42 MG/DL (ref 40–60)
POTASSIUM BLD-SCNC: 5 MMOL/L (ref 3.5–5.5)
PROT SERPL-MCNC: 7.6 G/DL (ref 5.7–8.2)
SODIUM BLD-SCNC: 139 MMOL/L (ref 132–146)
TRIGL SERPL-MCNC: 241 MG/DL (ref 0–150)
TSH SERPL DL<=0.05 MIU/L-ACNC: 0.95 MIU/ML (ref 0.35–5.35)

## 2017-08-16 PROCEDURE — 82306 VITAMIN D 25 HYDROXY: CPT | Performed by: NURSE PRACTITIONER

## 2017-08-16 PROCEDURE — 84080 ASSAY ALKALINE PHOSPHATASES: CPT | Performed by: NURSE PRACTITIONER

## 2017-08-16 PROCEDURE — 80061 LIPID PANEL: CPT | Performed by: NURSE PRACTITIONER

## 2017-08-16 PROCEDURE — 84075 ASSAY ALKALINE PHOSPHATASE: CPT | Performed by: NURSE PRACTITIONER

## 2017-08-16 PROCEDURE — 86803 HEPATITIS C AB TEST: CPT | Performed by: NURSE PRACTITIONER

## 2017-08-16 PROCEDURE — 99396 PREV VISIT EST AGE 40-64: CPT | Performed by: NURSE PRACTITIONER

## 2017-08-16 PROCEDURE — 17000 DESTRUCT PREMALG LESION: CPT | Performed by: NURSE PRACTITIONER

## 2017-08-16 PROCEDURE — 80053 COMPREHEN METABOLIC PANEL: CPT | Performed by: NURSE PRACTITIONER

## 2017-08-16 PROCEDURE — 36415 COLL VENOUS BLD VENIPUNCTURE: CPT

## 2017-08-16 PROCEDURE — 84443 ASSAY THYROID STIM HORMONE: CPT | Performed by: NURSE PRACTITIONER

## 2017-08-16 RX ORDER — SACCHAROMYCES BOULARDII 250 MG
250 CAPSULE ORAL 2 TIMES DAILY
COMMUNITY

## 2017-08-16 NOTE — TELEPHONE ENCOUNTER
----- Message from SALVATORE Sosa sent at 8/16/2017  1:14 PM EDT -----  Please let Pat know her her cholesterol and triglycerides are higher than before. She does not want to start a statin, but needs to make the diet changes we discussed,  mainly water to drink, limit white flour/processed sugar, and processed foods, choose fresh fruits, vegetables, fish, lean meats,high fiber carbs, exercise  working toward 150 mins cardio per week, weight training 2x/week. She may try omega3 fatty acids or fish oil OTC to help lower cholesterol also.   Her Hep C was negative  Vitamin D is still low, I recommend 2000 units OTC Vit D3.   Other labs are stable

## 2017-08-16 NOTE — PROGRESS NOTES
Patient Care Team:  Murali Scott MD as PCP - General (Internal Medicine)     Chief complaint: Patient is in today for a physical     Patient is a 61 y.o. female who presents for her yearly physical exam.     CHRIS Castro is here today for annual physical, she needs a diagnostic mammogram ordered because she has a history of breast cancer, prefers [facility].  Patient has a small skin lesion she wants removed today in center of chest.  Patient states she eats a regular diet, sandwiches, occasional fast food. Patient declines statin therapy for hyperlipidemia, states it causes her legs to ache. Patient stays active, denies chest pain, shortness of breath, dizziness, or vision changes.  Patient smokes 1/2 pack of cigarettes/day, has tried Chantix in the past, is not interested in trying other methods at this time.  Review of Systems   Constitutional: Negative for activity change, appetite change, chills, diaphoresis, fatigue, fever and unexpected weight change.   HENT: Negative for congestion, ear discharge, ear pain, postnasal drip, rhinorrhea and sinus pressure.    Eyes: Negative for photophobia, pain, itching and visual disturbance.   Respiratory: Negative for cough, shortness of breath and wheezing.    Cardiovascular: Negative for chest pain, palpitations and leg swelling.   Gastrointestinal: Negative for abdominal pain, blood in stool, constipation, diarrhea and vomiting.   Endocrine: Negative for cold intolerance, heat intolerance, polydipsia and polyuria.   Genitourinary: Negative for difficulty urinating, dysuria, frequency, urgency, vaginal bleeding and vaginal discharge.   Musculoskeletal: Positive for arthralgias. Negative for gait problem, joint swelling, neck pain and neck stiffness.   Skin:        Actinic keratosis on mid chest   Allergic/Immunologic: Positive for environmental allergies.   Neurological: Positive for numbness (chronic rt foot r/t past fracture) and headaches.  Negative for dizziness, syncope and facial asymmetry.   Psychiatric/Behavioral: Negative for behavioral problems, confusion and self-injury. The patient is not nervous/anxious.       History  Past Medical History:   Diagnosis Date   • Arthritis    • Breast cancer    • Fracture     right ankle   • Hyperlipidemia    • Wears eyeglasses    • Wears partial dentures     upper      Past Surgical History:   Procedure Laterality Date   • ANKLE OPEN REDUCTION INTERNAL FIXATION Right 10/20/2016    Procedure: RIGHT ANKLE OPEN REDUCTION INTERNAL FIXATION;  Surgeon: Cristina Lee MD;  Location:  [facility];  Service:    • BREAST LUMPECTOMY Right    • COLONOSCOPY      2004 approx   • HARDWARE REMOVAL Right 1/5/2017    Procedure: RIGHT ANKLE HARDWARE REMOVAL WITH IRRIGATION AND DEBRIDEMENT OF ANKLE WOUNDS;  Surgeon: Cristina Lee MD;  Location:  [facility];  Service:    • MOUTH SURGERY        Allergies   Allergen Reactions   • Codeine    • Sulfa Antibiotics Nausea And Vomiting      Family History   Problem Relation Age of Onset   • Cancer Father      Social History     Social History   • Marital status:      Spouse name: N/A   • Number of children: N/A   • Years of education: N/A     Occupational History   • Not on file.     Social History Main Topics   • Smoking status: Current Every Day Smoker     Types: Electronic Cigarette   • Smokeless tobacco: Never Used   • Alcohol use No   • Drug use: No   • Sexual activity: Defer     Other Topics Concern   • Not on file     Social History Narrative        Current Outpatient Prescriptions:   •  ALPRAZolam (XANAX) 0.25 MG tablet, Take 0.25 mg by mouth 2 (Two) Times a Day As Needed for anxiety., Disp: , Rfl:   •  DULoxetine (CYMBALTA) 30 MG capsule, Take 30 mg by mouth Daily., Disp: , Rfl:   •  gabapentin (NEURONTIN) 600 MG tablet, , Disp: , Rfl:   •  saccharomyces boulardii (FLORASTOR) 250 MG capsule, Take 250 mg by mouth 2 (Two) Times a Day., Disp: , Rfl:     Immunizations   N/A    Prescribed/Refused   Date     Td/Tdap  (Booster Q 10 yrs)   [x]           Prescribed    []     Refused        []           Flu  (Yearly)   [x]        Prescribed    []     Refused        []           Pneumovax  (1 yr after Prevnar)   []        Prescribed    []     Refused        []      Discussed with patient, she will notify office when she needs     Prevnar 13  (1 yr after Pneumo)   [x]        Prescribed    []     Refused        []           Hep B     [x]        Prescribed    []     Refused        []           Zostavax  (Age 60 and older)   []        Prescribed    []     Refused        []      Discussed with patient, she will notify office when she wants order     Immunization History   Administered Date(s) Administered   • Tdap 04/10/2014     Health Maintenance   Topic Date Due   • PNEUMOCOCCAL VACCINE (19-64 MEDIUM RISK) (1 of 1 - PPSV23) 1975   • HEPATITIS C SCREENING  2017   • LIPID PANEL  2017   • COLONOSCOPY  2017   • ZOSTER VACCINE  2017   • MAMMOGRAM  2017   • PAP SMEAR  2017   • INFLUENZA VACCINE  2017   • TDAP/TD VACCINES (2 - Td) 04/10/2024        Hemoglobin A1C   Date Value Ref Range Status   10/19/2016 5.50 4.00 - 6.00 % Final       Additional Testing      Date     Colorectal Screening       []   N/A   []   Complete          Date:    Ordered today       Pap      []   N/A   [x]   Complete   Date:    Where:       Mammogram        []   N/A   []   Complete Date:  Ordered today     PSA  (Over age 50)    [x]   N/A   []   Complete   Date:    Where:     US Aorta  (For male smokers, age 65)     [x]   N/A   []   Complete   Date:    Where:     CT for Smoker  (Age 55-75, 30 pk yr)    []   N/A   []   Complete   Date:    Where:     Bone Density/DEXA      []   N/A   []   Complete   Date:    Follow-up:     Hep. C  ( 6475-1904)      []   N/A   [x]   Complete   Date:    Where:   /80 (BP Location: Left arm, Patient Position: Sitting, Cuff Size: Adult)  Pulse 76  " Ht 62\" (157.5 cm)  Wt 133 lb (60.3 kg)  SpO2 96%  BMI 24.33 kg/m2        Physical Exam   Constitutional: She is oriented to person, place, and time. She appears well-developed and well-nourished. No distress.   HENT:   Head: Normocephalic and atraumatic.   Right Ear: External ear normal. Tympanic membrane is not bulging. A middle ear effusion is present.   Left Ear: External ear normal. Tympanic membrane is not bulging. A middle ear effusion is present.   Nose: Nose normal.   Mouth/Throat: Oropharynx is clear and moist. No oropharyngeal exudate.   Eyes: Conjunctivae and EOM are normal. Pupils are equal, round, and reactive to light. No scleral icterus.   Neck: Normal range of motion. Neck supple. No JVD present. No tracheal deviation present. No thyromegaly present.   Cardiovascular: Normal rate, regular rhythm, normal heart sounds and intact distal pulses.  Exam reveals no friction rub.    No murmur heard.  No bruit   Pulmonary/Chest: Effort normal and breath sounds normal. No respiratory distress. She has no wheezes. Right breast exhibits no inverted nipple, no nipple discharge, no skin change and no tenderness. Mass: scar tissue. Left breast exhibits no inverted nipple, no mass, no skin change and no tenderness.   Abdominal: Soft. Bowel sounds are normal. She exhibits no distension and no mass. There is no tenderness. No hernia.   No aortic or renal artery bruit   Genitourinary: Vagina normal and uterus normal. Pelvic exam was performed with patient supine. Cervix exhibits friability. Cervix exhibits no motion tenderness and no discharge. Right adnexum displays no mass and no fullness. Left adnexum displays no tenderness and no fullness. No erythema, tenderness or bleeding in the vagina. No signs of injury around the vagina.   Musculoskeletal: She exhibits no edema, tenderness or deformity.   Lymphadenopathy:     She has no cervical adenopathy.   Neurological: She is alert and oriented to person, place, and " time. She has normal reflexes. No cranial nerve deficit.   Skin: Skin is warm and dry. No rash noted. No erythema. No pallor.   0.1 actinic keratosis, cryotherapy to remove   Psychiatric: She has a normal mood and affect. Her behavior is normal. Judgment and thought content normal.   Vitals reviewed.          Counseling provided on diet and nutrition, exercise and tobacco cessation.    Diagnoses and all orders for this visit:    Annual physical exam  -     Comprehensive metabolic panel; Future  -     TSH; Future  -     Vitamin D 25 hydroxy; Future  -     Hepatitis C Antibody  -     Mammo Diagnostic Bilateral With CAD; Future    Mixed hyperlipidemia  -     Lipid panel; Future    Vitamin D deficiency  -     Vitamin D 25 hydroxy; Future      I will contact patient regarding test results and provide instructions regarding any necessary changes in plan of care.  Smoking cessation advised.  Pt voiced understanding of health risks of continued smoking.  I recommended patient have pneumonia and shingles vaccines, she will notify office when she is ready for these to be ordered. She may go to her pharmacy to request.  Nutrition and activity goals reviewed including: mainly water to drink, limit white flour/processed sugar, high protein foods, such as nuts, fish, lean meats high fiber carbs fresh fruits and vegetables, good breakfast, increase exercise, working toward 150 mins cardio per week, weight training 2x/week.  Patient was encouraged to keep me informed of any acute changes, lack of improvement, or any new concerning symptoms.Patient voiced understanding of all instructions and denied further questions.     Procedure   Cryotherapy, Skin Lesion  Date/Time: 8/16/2017 10:53 AM  Performed by: BIRD LAND  Authorized by: BIRD LAND   Local anesthesia used: no    Anesthesia:  Local anesthesia used: no    Sedation:  Patient sedated: no  Patient tolerance: Patient tolerated the procedure well with no immediate  complications  Comments: Center of sternum, mid chest. Approximately 0.2 cm lesion, patient advised of expected skin change and healing process          SALVATORE Sosa   8/16/2017   11:16 AM

## 2017-08-18 LAB
ALP BONE CFR SERPL: 37 % (ref 14–68)
ALP INTEST CFR SERPL: 2 % (ref 0–18)
ALP LIVER CFR SERPL: 62 % (ref 18–85)
ALP SERPL-CCNC: 111 IU/L (ref 39–117)

## 2017-09-01 DIAGNOSIS — Z00.00 ANNUAL PHYSICAL EXAM: ICD-10-CM

## 2017-09-05 ENCOUNTER — TELEPHONE (OUTPATIENT)
Dept: FAMILY MEDICINE CLINIC | Facility: CLINIC | Age: 62
End: 2017-09-05

## 2017-09-05 NOTE — TELEPHONE ENCOUNTER
----- Message from SALVATORE Sosa sent at 9/1/2017  8:40 PM EDT -----  Please let patient know her Pap was normal

## 2025-02-14 ENCOUNTER — READMISSION MANAGEMENT (OUTPATIENT)
Dept: CALL CENTER | Facility: HOSPITAL | Age: 70
End: 2025-02-14
Payer: COMMERCIAL

## 2025-02-14 NOTE — OUTREACH NOTE
Prep Survey      Flowsheet Row Responses   [facility] facility patient discharged from? Non-[facility]   Is LACE score < 7 ? Non-BH Discharge   Eligibility Not Eligible   What are the reasons patient is not eligible? Other  [Seen pcp Over 5 years ago]   Does the patient have one of the following disease processes/diagnoses(primary or secondary)? Other   Prep survey completed? Yes            Sarahy HANSEN - Registered Nurse

## 2025-05-12 NOTE — PROGRESS NOTES
[facility]    Subjective     Patient Name: Kermit Castro  YOB: 1943  Patient Age: 81 y.o.  Patient Sex: male  Patient Phone: [phone]  PCP: Abdiaziz Lim MD  Last PCP Appointment: 2025    History of Present Illness    CHIEF COMPLAINT:  Mr. Castro presents today for pre-operative clearance for upcoming neck surgery    HISTORY OF PRESENT ILLNESS:  He reviews remote fall on steps resulting in back injury with subsequent neck and back pain. Imaging concerning for high-grade canal stenosis at multiple levels and high-grade foraminal stenosis throughout the cervical spine. Medical evaluation indicates cervical spine surgery is necessary to prevent potential paralysis. Pt presents for pre-operative assessment.    RESPIRATORY:  He becomes short of breath with extended exertion, able to walk approximately one block before experiencing dyspnea. He reports ability to quickly catch breath when episodes occur. He states that lower extremity weakness and pain are more limited than SOB. Pulmonary function test was recently completed at pulmonary clinic.    COPD:  He takes Lasix 2-3 times per week for fluid management. He has Trelegy and DuoNeb nebulizer solution at home to control symptoms. He previously used a rescue albuterol nebulizer but it has not been refilled.     CARDIAC  Cardiac catheterization with stent placement in . TTE recently repeated prior to surgery showing GI diastolic dysfunction with maintained EF. He continues with GDMT and has well controlled BP. Followed by Dr Duran, Cardiology.    FAMILY HISTORY:  Brother  from stroke.    ROS:  ROS is negative unless otherwise indicated in HPI.         Health Maintenance Summary            Current Care Gaps       Shingles Vaccine (3 of 3) Overdue since 2023  Imm Admin: Zoster Recombinant    2017  Imm Admin: Zoster              COVID-19 Vaccine ( season) Overdue since 2024       Acute Care - Wound/Debridement Treatment Note  [facility]     Patient Name: Gloria Oropeza  : 1955  MRN: [id number]  Today's Date: 2017  Onset of Illness/Injury or Date of Surgery Date: 17   Date of Referral to PT: 17   Referring Physician: MD Jesus       Admit Date: 2017    Visit Dx:    ICD-10-CM ICD-9-CM   1. Open wound of ankle with complication, left, subsequent encounter S91.002D V58.89     891.1   2. Surgical wound breakdown, subsequent encounter T81.31XD V58.89     998.32   3. Impaired functional mobility, balance, gait, and endurance Z74.09 V49.89       Patient Active Problem List   Diagnosis   • Ankle fracture, right   • Closed right ankle fracture   • ORIF of fracture of ankle 10/20/2016   • Hyperlipidemia   • Tobacco abuse   • Surgical wound breakdown   • S/P right ankle hardware removal with irrigation and debridement of ankle wounds               LDA Wound       17 1000 17 1500       Wound 17 0815 Right lateral ankle other (see comments)    Wound - Properties Group Date first assessed: 17  -MF Time first assessed: 815  -MF Side: Right  -MF Orientation: lateral  -MF Location: ankle  -MF Type: other (see comments)  -MF, surgical     Dressing Appearance  other (see comments)   secured with ace wrap, PT did not visualize dressing  -JM     Base unable to visualize;dressing in place  -JM      Therapy Setting (Negative Pressure Wound Therapy) continuous therapy  -JM continuous therapy  -JM     Pressure Setting (Negative Pressure Wound Therapy) 125 mmHg  - mmHg  -     Output (mL) 0  -JM 0  -JM     Wound 17 0815 Right medial ankle other (see comments)    Wound - Properties Group Date first assessed: 17  -MF Time first assessed: 815  -MF Side: Right  -MF Orientation: medial  -MF Location: ankle  -MF Type: other (see comments)  -MF, surgical     Dressing Appearance  other (see comments)   PT did not visualize due to ace wrap/brace  02/24/2022  Imm Admin: COVID-19, MRNA, LN-S, PF (MODERNA FULL 0.5 ML DOSE)    02/12/2021  Imm Admin: COVID-19, MRNA, LN-S, PF (MODERNA FULL 0.5 ML DOSE)    01/12/2021  Imm Admin: COVID-19, MRNA, LN-S, PF (MODERNA FULL 0.5 ML DOSE)              TETANUS VACCINE (Every 10 Years) Never done     No completion history exists for this topic.              RSV Vaccine (Age 60+ and Pregnant patients) (1 - 1-dose 75+ series) Never done     No completion history exists for this topic.                      Upcoming       Hemoglobin A1c (Prediabetes) (Yearly) Next due on 5/5/2026 05/05/2025  Hemoglobin A1C External component of Hemoglobin A1C    01/27/2025  Hemoglobin A1C External component of HEMOGLOBIN A1C              Aspirin/Antiplatelet Therapy (Yearly) Next due on 5/12/2026 05/12/2025  Registry Metric: Last Current Aspirin/Antiplatelet Reviewed Date    01/31/2025  Registry Metric: Last Active Aspirin/antiplatelet Medication              DEXA Scan (Every 2 Years) Next due on 3/14/2027      03/14/2025  DXA Bone Density Axial Skeleton 1 or more sites              Lipid Panel (Every 5 Years) Next due on 1/27/2030 01/27/2025  Cholesterol Total component of LIPID PANEL    03/17/2022  Cholesterol Total component of Lipid Panel    10/05/2021  Cholesterol Total component of LIPID PANEL    08/27/2019  Cholesterol Total component of Lipid panel    08/09/2005  Cholesterol Total component of Lipid panel     Only the first 5 history entries have been loaded, but more history exists.                    Completed or No Longer Recommended       Influenza Vaccine (Series Information) Completed      02/20/2025  Smart[employer]: WORKFLOW - HEALTHY PLANET - EXTERNAL DATA - EXTERNAL PROCEDURE DATE - INFLUENZA    12/12/2022  SmartData: WORKFLOW - HEALTHY PLANET - EXTERNAL DATA - EXTERNAL PROCEDURE DATE - INFLUENZA    10/19/2020  Imm Admin: Influenza - Quadrivalent - High Dose - PF (65 years and older)    11/18/2019  Imm Admin: Influenza  in place  -JM     Base unable to visualize;dressing in place  -JM      Therapy Setting (Negative Pressure Wound Therapy) continuous therapy  -JM continuous therapy  -JM     Pressure Setting (Negative Pressure Wound Therapy) 125 mmHg  - mmHg  -JM     Output (mL) 0  -JM 0  -JM       User Key  (r) = Recorded By, (t) = Taken By, (c) = Cosigned By    Initials Name Provider Type     Willard Mcdonald, PT Physical Therapist     Izabela Adair, PT Physical Therapist                  Adult Rehabilitation Note       01/08/17 1000 01/07/17 1500 01/07/17 1435    Rehab Assessment/Intervention    Discipline physical therapist  -JM physical therapist  -JM physical therapist  -CT    Document Type therapy note (daily note)   wound care  -JM therapy note (daily note)   wound care  -JM therapy note (daily note)  -CT    Subjective Information agree to therapy;complains of;pain  -JM agree to therapy;complains of;pain   Pt had multiple questions about d/c plans and home vac  -JM agree to therapy;complains of   fullness in foot  -CT    Patient Effort, Rehab Treatment good  -JM good  -JM     Symptoms Noted During/After Treatment none  -JM      Precautions/Limitations  fall precautions;non-weight bearing status   NWB RLE  -JM     Recorded by [JM] Izabela Adair, PT [JM] Izabela Adair, PT [CT] Valdemar Pal, PT    Pain Assessment    Pain Assessment 0-10  -JM 0-10  -JM 0-10  -CT    Pain Score 3  -JM 4  -JM 4  -CT    Post Pain Score 3  -JM 4  -JM 4  -CT    Pain Type   Acute pain  -CT    Pain Location Ankle  -JM      Pain Orientation Right  -JM      Recorded by [JM] Izabela Adair, PT [JM] Izabela Adair, PT [CT] Valdemar Pal, PT    Cognitive Assessment/Intervention    Current Cognitive/Communication Assessment functional  -JM functional  -JM     Orientation Status oriented x 4  -JM oriented x 4  -JM     Follows Commands/Answers Questions 100% of the time  -% of the time  -JM     Recorded by  - Trivalent - Fluzone High Dose - PF (65 years and older)    10/24/2018  Imm Admin: Influenza - Trivalent - Fluzone High Dose - PF (65 years and older)      Only the first 5 history entries have been loaded, but more history exists.              Pneumococcal Vaccines (Age 50+) (Series Information) Completed      07/28/2023  Imm Admin: Pneumococcal Conjugate - 20 Valent    10/19/2020  Imm Admin: Pneumococcal Conjugate - 13 Valent                            Prior to Admission medications    Medication Sig Start Date End Date Taking? Authorizing Provider   albuterol-ipratropium (DUO-NEB) 2.5 mg-0.5 mg/3 mL nebulizer solution Take 3 mLs by nebulization every 6 (six) hours as needed for Wheezing. Rescue 1/31/25  Yes Abdiaziz Lim MD   amLODIPine (NORVASC) 10 MG tablet Take 1 tablet (10 mg total) by mouth once daily. 1/31/25  Yes Abdiaziz Lim MD   betamethasone dipropionate (BETANATE) 0.05 % ointment Apply topically 2 (two) times daily. Use to affected areas for up to 2 weeks then take a 1 week break or decrease to 3 times weekly. Do not apply to groin or face. Apply to itchy bumps on arms 1/31/25  Yes Abdiaziz Lim MD   clopidogreL (PLAVIX) 75 mg tablet Take 4 tablets on day 1 then take 1 tablet daily there after. 1/31/25  Yes Abdiaziz Lim MD   ezetimibe (ZETIA) 10 mg tablet Take 1 tablet (10 mg total) by mouth once daily. 2/18/25 2/18/26 Yes Abdiaziz Lim MD   finasteride (PROSCAR) 5 mg tablet Take 1 tablet (5 mg total) by mouth once daily. 1/31/25  Yes Abdiaziz Lim MD   furosemide (LASIX) 40 MG tablet Take 1 tablet (40 mg total) by mouth once daily. 4/19/22 5/12/25 Yes Moiz Villalta Jr., MD   lisinopriL (PRINIVIL,ZESTRIL) 40 MG tablet Take 40 mg by mouth. 3/28/25  Yes Provider, Historical   lubiprostone (AMITIZA) 24 MCG Cap    Yes Provider, Historical   metoprolol succinate (TOPROL-XL) 100 MG 24 hr tablet Take 1 tablet (100 mg total) by mouth once daily. 4/9/25 4/9/26 Yes Raffi Duran MD   nintedanib (OFEV) 150  [JM] Izabela Adair, PT [JM] Izabela Adair, PT     Bed Mobility, Assessment/Treatment    Bed Mobility, Assistive Device   head of bed elevated  -CT    Bed Mobility, Roll Right, [place]   conditional independence  -CT    Bed Mob, Supine to Sit, [place]   conditional independence  -CT    Bed Mob, Sit to Supine, [place]   conditional independence  -CT    Recorded by   [CT] Valdemar Pal, PT    Transfer Assessment/Treatment    Transfers, Sit-Stand [place]   supervision required  -CT    Transfers, Sit-Stand-Sit, Assist Device   rolling walker  -CT    Transfer, Maintain Weight Bearing Status   able to maintain weight bearing status  -CT    Transfer, Impairments   impaired balance  -CT    Recorded by   [CT] Valdemar Pal, PT    Gait Assessment/Treatment    Gait, [place] Level   contact guard assist  -CT    Gait, Assistive Device   rolling walker  -CT    Gait, Distance (Feet)   120  -CT    Gait, Gait Deviations   elma decreased  -CT    Recorded by   [CT] Valdemar Pal PT    Positioning and Restraints    Pre-Treatment Position in bed  - in bed  - in bed  -CT    Post Treatment Position bed  - bed  - bed  -CT    In Bed notified nsg;fowlers;call light within reach;encouraged to call for assist;RLE elevated  - notified nsg;fowlers;call light within reach;encouraged to call for assist;with family/caregiver;RLE elevated  - notified nsg;sitting;exit alarm on;call light within reach  -CT    Recorded by [JM] Izabela Adair, PT [JM] Izabela Adair, PT [CT] Valdemar Pal, PT      User Key  (r) = Recorded By, (t) = Taken By, (c) = Cosigned By    Initials Name Effective Dates    CT Valdemar Pal, PT 06/19/15 -     JM Izabela Adair, PT 06/19/15 -                 IP PT Goals       01/07/17 1555 01/06/17 0815 01/05/17 1659    Bed Mobility PT LTG    Bed Mobility PT LTG, Date Established   01/05/17  -MJ    Bed Mobility PT LTG, Time to  Achieve   5 days  -MJ    Bed Mobility PT LTG, Activity Type   supine to sit/sit to supine  -MJ    Bed Mobility PT LTG, [place] Level   independent  -MJ    Bed Mobility PT LTG, Outcome goal met  -CT      Transfer Training PT LTG    Transfer Training PT LTG, Date Established   01/05/17  -MJ    Transfer Training PT LTG, Time to Achieve   5 days  -MJ    Transfer Training PT LTG, Activity Type   sit to stand/stand to sit  -MJ    Transfer Training PT LTG, [place] Level   conditional independence  -MJ    Transfer Training PT LTG, Assist Device   walker, rolling  -MJ    Transfer Training PT LTG, Outcome goal met  -CT      Gait Training PT LTG    Gait Training Goal PT LTG, Date Established   01/05/17  -MJ    Gait Training Goal PT LTG, Time to Achieve   5 days  -MJ    Gait Training Goal PT LTG, [place] Level   conditional independence  -MJ    Gait Training Goal PT LTG, Assist Device   walker, rolling  -MJ    Gait Training Goal PT LTG, Distance to Achieve   150 feet  -MJ    Stair Training PT LTG    Stair Training Goal PT LTG, Date Established   01/05/17  -MJ    Stair Training Goal PT LTG, Time to Achieve   5 days  -MJ    Stair Training Goal PT LTG, Number of Steps   1  -MJ    Stair Training Goal PT LTG, [place] Level   supervision required  -MJ    Stair Training Goal PT LTG, Assist Device   walker, rolling   backward  -MJ    Wound Care PT LTG    Wound Care PT LTG 1, Date Established  01/06/17  -MF     Wound Care PT LTG 1, Time to Achieve  other (see comments)   10 days  -MF     Wound Care PT LTG 1, Location  R lat ankle wound  -MF     Wound Care PT LTG 1, No S&S of Infection  yes  -MF     Wound Care PT LTG 1, Decrease Wound Size  10%  -MF     Wound Care PT LTG 1, Education  wound care  -MF     Wound Care PT LTG 1, Education Understanding  verbalize understanding  -       User Key  (r) = Recorded By, (t) = Taken By, (c) = Cosigned By    Initials Name Provider Type    MARIS Mcdonald, PT Physical  mg Cap Take 1 capsule (150 mg total) by mouth 2 (two) times a day. 11/12/24 11/7/25 Yes Linden Mesa MD   oxyCODONE-acetaminophen (PERCOCET)  mg per tablet Take 1 tablet by mouth every 12 (twelve) hours as needed for Pain. 3/19/25  Yes Abdiaziz Lim MD   pantoprazole (PROTONIX) 40 MG tablet Take 1 tablet (40 mg total) by mouth before breakfast. 1/31/25  Yes Abdiaziz Lim MD   potassium chloride SA (K-DUR,KLOR-CON) 20 MEQ tablet Take 1 tablet by mouth once daily.   Yes Provider, Historical   pregabalin (LYRICA) 50 MG capsule Take 1 capsule (50 mg total) by mouth 3 (three) times daily. 2/6/25 8/7/25 Yes BRIDGET Ozuna NP   pulse oximeter (PULSE OXIMETER) device by Apply Externally route 2 (two) times a day. Use twice daily at 8 AM and 3 PM and record the value in [facility] as directed. 10/11/21  Yes Julisa Garza PA   rosuvastatin (CRESTOR) 40 MG Tab Take 1 tablet (40 mg total) by mouth every evening. 1/31/25  Yes Abdiaziz Lim MD   spironolactone (ALDACTONE) 25 MG tablet Take 1 tablet (25 mg total) by mouth once daily. 4/21/25 4/21/26 Yes Abdiaziz Lim MD   tamsulosin (FLOMAX) 0.4 mg Cap Take 1 capsule (0.4 mg total) by mouth once daily. 1/31/25 1/31/26 Yes Abdiaziz Lim MD   TRELEGY ELLIPTA 100-62.5-25 mcg DsDv Inhale 1 puff into the lungs once daily. 4/21/25  Yes Abdiaziz Lim MD   valsartan (DIOVAN) 320 MG tablet Take 1 tablet (320 mg total) by mouth once daily. 3/19/25 3/19/26 Yes Abdiaziz Lim MD   albuterol sulfate 2.5 mg/0.5 mL Nebu Take 2.5 mg by nebulization every 4 (four) hours as needed (wheezing/shortness of breath). Rescue 1/31/25 5/12/25 Yes Abdiaziz Lim MD   albuterol sulfate 2.5 mg/0.5 mL Nebu Take 2.5 mg by nebulization every 4 (four) hours as needed (wheezing/shortness of breath). Rescue 5/12/25 5/12/26  Abdiaziz Lim MD   flu vacc [id number] up,PF (FLUZONE HIGH-DOSE 2019-20, PF,) 180 mcg/0.5 mL Syrg Fluzone High-Dose 2019-20 (PF) 180 mcg/0.5 mL intramuscular syringe    "PHARMACIST ADMINISTERED IMMUNIZATION ADMINISTERED AT TIME OF DISPENSING  Patient not taking: Reported on 5/12/2025    Provider, Historical   influenza (HIGH-DOSE PF) 240 mcg/0.7 mL vaccine Fluzone High-Dose [id number] (PF) 180 mcg/0.5 mL intramuscular syringe  Patient not taking: Reported on 5/12/2025    Provider, Historical   multivitamin (THERAGRAN) per tablet Take 1 tablet by mouth once daily.  Patient not taking: Reported on 5/12/2025    Provider, Historical       OBJECTIVE:     Vitals:    05/12/25 0912   BP: 126/82   BP Location: Right arm   Patient Position: Sitting   Pulse: 73   SpO2: 98%   Weight: 82.1 kg (180 lb 14.2 oz)   Height: 5' 8" (1.727 m)       Body mass index is 27.5 kg/m².     PHYSICAL EXAM  GEN - A+OX4, NAD   HEENT - PERRL, EOMI  Neck - No thyromegaly or cervical LAD.   CV - RRR, no m/r   Chest - CTAB, no wheezing or rhonchi  Abd - S/NT/ND/+BS.   Ext - 2+BDP and radial pulses. Mild BLE edema.  MSK - normal ROM, tenderness of the cervical spine  Neuro - 5/5 BUE and BLE strength. Radicular lower back pain  LN - No LAD appreciated.  Skin - No rash.     LABS  Lab Results   Component Value Date    WBC 8.50 05/05/2025    HGB 13.4 (L) 05/05/2025    HCT 43.3 05/05/2025    MCV 91 05/05/2025     05/05/2025         CMP  Sodium   Date Value Ref Range Status   05/05/2025 141 136 - 145 mmol/L Final   03/18/2025 141 136 - 145 mmol/L Final     Potassium   Date Value Ref Range Status   05/05/2025 4.3 3.5 - 5.1 mmol/L Final   03/18/2025 3.6 3.5 - 5.1 mmol/L Final     Chloride   Date Value Ref Range Status   05/05/2025 106 95 - 110 mmol/L Final   03/18/2025 107 95 - 110 mmol/L Final     CO2   Date Value Ref Range Status   05/05/2025 25 23 - 29 mmol/L Final   03/18/2025 23 23 - 29 mmol/L Final     Glucose   Date Value Ref Range Status   05/05/2025 101 70 - 110 mg/dL Final   03/18/2025 91 70 - 110 mg/dL Final     BUN   Date Value Ref Range Status   05/05/2025 11 8 - 23 mg/dL Final     Creatinine   Date Value " Therapist    CT Valdemar Pal, PT Physical Therapist    DAMARIS Bosch, PT Physical Therapist          Physical Therapy Education     Title: PT OT SLP Therapies (Active)     Topic: Physical Therapy (Active)     Point: Mobility training (Active)    Learning Progress Summary    Learner Readiness Method Response Comment Documented by Status   Patient Acceptance E,D NR  CT 01/07/17 1557 Active    Acceptance E,D [facility] 01/05/17 1659 Done               Point: Home exercise program (Active)    Learning Progress Summary    Learner Readiness Method Response Comment Documented by Status   Patient Acceptance E,D NR  CT 01/07/17 1557 Active               Point: Body mechanics (Active)    Learning Progress Summary    Learner Readiness Method Response Comment Documented by Status   Patient Acceptance E,D NR  CT 01/07/17 1557 Active    Acceptance E,D [facility] 01/05/17 1659 Done               Point: Precautions (Active)    Learning Progress Summary    Learner Readiness Method Response Comment Documented by Status   Patient Acceptance E,D NR  CT 01/07/17 1557 Active    Acceptance E,D [facility] 01/05/17 1659 Done                      User Key     Initials Effective Dates Name Provider Type Discipline    CT 06/19/15 -  Valdemar Pal, PT Physical Therapist PT     03/14/16 -  Giovany Bosch PT Physical Therapist PT                   PT ASSESSMENT (last 72 hours)      PT Evaluation       01/08/17 1000 01/08/17 0306    Rehab Evaluation    Document Type therapy note (daily note)   wound care  -JM     Subjective Information agree to therapy;complains of;pain  -JM     Patient Effort, Rehab Treatment good  -JM     Symptoms Noted During/After Treatment none  -JM     Living Environment    Transportation Available  car;family or friend will provide  -BS    Pain Assessment    Pain Assessment 0-10  -JM     Pain Score 3  -JM     Post Pain Score 3  -JM     Pain Location Ankle  -JM     Pain Orientation Right  -JM     Cognitive  Assessment/Intervention    Current Cognitive/Communication Assessment functional  -JM     Orientation Status oriented x 4  -JM     Follows Commands/Answers Questions 100% of the time  -JM     Positioning and Restraints    Pre-Treatment Position in bed  -JM     Post Treatment Position bed  -JM     In Bed notified nsg;fowlers;call light within reach;encouraged to call for assist;RLE elevated  -JM       01/07/17 1500 01/07/17 1435    Rehab Evaluation    Document Type therapy note (daily note)   wound care  -JM therapy note (daily note)  -CT    Subjective Information agree to therapy;complains of;pain   Pt had multiple questions about d/c plans and home vac  -JM agree to therapy;complains of   fullness in foot  -CT    Patient Effort, Rehab Treatment good  -JM     General Information    Precautions/Limitations fall precautions;non-weight bearing status   NWB RLE  -JM     Pain Assessment    Pain Assessment 0-10  -JM 0-10  -CT    Pain Score 4  -JM 4  -CT    Post Pain Score 4  -JM 4  -CT    Pain Type  Acute pain  -CT    Cognitive Assessment/Intervention    Current Cognitive/Communication Assessment functional  -JM     Orientation Status oriented x 4  -JM     Follows Commands/Answers Questions 100% of the time  -JM     Bed Mobility, Assessment/Treatment    Bed Mobility, Assistive Device  head of bed elevated  -CT    Bed Mobility, Roll Right, [place]  conditional independence  -CT    Bed Mob, Supine to Sit, [place]  conditional independence  -CT    Bed Mob, Sit to Supine, [place]  conditional independence  -CT    Transfer Assessment/Treatment    Transfers, Sit-Stand [place]  supervision required  -CT    Transfers, Sit-Stand-Sit, Assist Device  rolling walker  -CT    Transfer, Maintain Weight Bearing Status  able to maintain weight bearing status  -CT    Transfer, Impairments  impaired balance  -CT    Gait Assessment/Treatment    Gait, [place] Level  contact guard assist  -CT    Gait, Assistive Device   Ref Range Status   05/05/2025 0.9 0.5 - 1.4 mg/dL Final     Calcium   Date Value Ref Range Status   05/05/2025 9.0 8.7 - 10.5 mg/dL Final   03/18/2025 9.0 8.7 - 10.5 mg/dL Final     Protein Total   Date Value Ref Range Status   05/05/2025 6.4 6.0 - 8.4 gm/dL Final     Total Protein   Date Value Ref Range Status   03/18/2025 6.8 6.0 - 8.4 g/dL Final     Albumin   Date Value Ref Range Status   05/05/2025 3.4 (L) 3.5 - 5.2 g/dL Final   03/18/2025 3.5 3.5 - 5.2 g/dL Final     Total Bilirubin   Date Value Ref Range Status   03/18/2025 0.8 0.1 - 1.0 mg/dL Final     Comment:     For infants and newborns, interpretation of results should be based  on gestational age, weight and in agreement with clinical  observations.    Premature Infant recommended reference ranges:  Up to 24 hours.............<8.0 mg/dL  Up to 48 hours............<12.0 mg/dL  3-5 days..................<15.0 mg/dL  6-29 days.................<15.0 mg/dL       Bilirubin Total   Date Value Ref Range Status   05/05/2025 0.8 0.1 - 1.0 mg/dL Final     Comment:     For infants and newborns, interpretation of results should be based   on gestational age, weight and in agreement with clinical   observations.    Premature Infant recommended reference ranges:   0-24 hours:  <8.0 mg/dL   24-48 hours: <12.0 mg/dL   3-5 days:    <15.0 mg/dL   6-29 days:   <15.0 mg/dL     Alkaline Phosphatase   Date Value Ref Range Status   03/18/2025 66 40 - 150 U/L Final     ALP   Date Value Ref Range Status   05/05/2025 65 40 - 150 unit/L Final     AST   Date Value Ref Range Status   05/05/2025 12 11 - 45 unit/L Final   03/18/2025 12 10 - 40 U/L Final     ALT   Date Value Ref Range Status   05/05/2025 10 10 - 44 unit/L Final   03/18/2025 11 10 - 44 U/L Final     Anion Gap   Date Value Ref Range Status   05/05/2025 10 8 - 16 mmol/L Final     eGFR   Date Value Ref Range Status   05/05/2025 >60 >60 mL/min/1.73/m2 Final     Comment:     Estimated GFR calculated using the CKD-EPI  creatinine (2021) equation.   03/18/2025 >60 >60 mL/min/1.73 m^2 Final       ASSESSMENT & PLAN:   Mr. Kermit Castro is a 81 y.o. male who was seen in clinic today for pre-operative assessment. Reviewed recent echo results showing improved diastolic dysfunction and slightly elevated pulmonary pressure, likely related to COPD history. Noted weight loss of 6-7 lbs over the past year in setting of improved fluid management. Dyspnea on exertion, can walk about 1 block before experiencing fatigue.  Recent lab results show improved diabetes control and better renal and liver function. Will forward the pre-operative assessment and schedule clinic follow up in three months          1. Pre-op examination  Overview:  Active cardiac issues:  Active decompensated heart failure? No   Unstable angina?  No   Significant uncontrolled arrhythmias? No   Severe valvular heart disease-Aortic or Mitral Stenosis? No   Recent MI or coronary revascularization < 30 days? No     Cardiac Risk Factors  History of CAD/ischemic heart disease? Yes   History of cerebrovascular disease? No   History of compensated heart failure? Yes   Type 2 diabetes requiring insulin? No   Serum Creatinine > 2? No   Total cardiac risk factors 2     Functional mets >4. This score is complicated by CHF/COPD and myelopathy associated with spinal stenosis. Pt feels more limited by myelopathy than SOB. Feels able to walk a flight of steps without stopping and walk uphill block.    < 4* METs -unable to walk > 2 blocks on level ground without stopping due to symptoms  - eating, dressing, toileting, walking indoors, light housework. POOR   > 4* METs -climbing > 1 flight of stairs without stopping  -walking up hill > 1-2 blocks  -scrubbing floors  -moving furniture  - golf, bowling, dancing or tennis  -running short distance MODERATE to EXCELLENT   * performance of any one of the activities       Assessment & Plan:  Cardiovascular Risk Assessment:  Non-emergent  rolling walker  -CT    Gait, Distance (Feet)  120  -CT    Gait, Gait Deviations  elma decreased  -CT    Positioning and Restraints    Pre-Treatment Position in bed  - in bed  -CT    Post Treatment Position bed  - bed  -CT    In Bed notified nsg;fowlers;call light within reach;encouraged to call for assist;with family/caregiver;RLE elevated  - notified nsg;sitting;exit alarm on;call light within reach  -CT      01/07/17 0259 01/06/17 1215    General Information    Equipment Currently Used at Home walker, rolling  -BS walker, rolling  -SK    Living Environment    Lives With  alone  -SK    Living Arrangements  house  -SK    Transportation Available car;family or friend will provide  - car;family or friend will provide  -SK      01/06/17 0815 01/06/17 0316    Rehab Evaluation    Document Type therapy note (daily note)   wound care eval  -     Subjective Information agree to therapy;complains of;pain  -     Living Environment    Transportation Available  car;family or friend will provide  -BS    Clinical Impression    Date of Referral to PT 01/05/17  -     PT Diagnosis open wound to R ankle  -     Rehab Potential fair, will monitor progress closely  -     Pain Assessment    Pain Assessment Rivera-Baker FACES  -     Rivera-Baker FACES Pain Rating 4  -     Pain Type Acute pain  -     Pain Location --   wound  -     Pain Intervention(s) Repositioned  -     Positioning and Restraints    Pre-Treatment Position in bed  -     Post Treatment Position bed  -     In Bed supine;RLE elevated  -       01/05/17 1633 01/05/17 1249    Rehab Evaluation    Document Type evaluation  -     Subjective Information agree to therapy;no complaints  -MJ     Patient Effort, Rehab Treatment good  -MJ     Symptoms Noted During/After Treatment none  -     General Information    Patient Profile Review yes  -MJ     Onset of Illness/Injury or Date of Surgery Date 01/05/17  -     Referring Physician MD Jesus  -MJ      General Observations Pt supine in bed, IV, ace bandage/splint to R ankle. Friend present  -     Pertinent History Of Current Problem Pt presents for surgical removal of wound debridement of R ankle. Pt underwent R ankle ORIF 10/2016 after sustaining trimalleolar fx from jumping off a four garvey  -     Precautions/Limitations fall precautions;non-weight bearing status   NWB R LE  -MJ     Prior Level of Function min assist:;all household mobility;community mobility;gait;independent:;transfer;bed mobility   Pt has been using rollator and NWB since sx, indep prior to  -MJ     Equipment Currently Used at Home other (see comments)   rollator  -MJ walker, standard  -MB    Plans/Goals Discussed With patient;agreed upon  -     Risks Reviewed patient:;LOB;increased discomfort  -MJ     Benefits Reviewed patient:;improve function;increase independence;increase strength;increase balance;decrease pain  -MJ     Barriers to Rehab none identified  -     Living Environment    Lives With alone  - alone  -MB    Living Arrangements house  - house  -MB    Home Accessibility stairs within home;tub/shower is not walk in  -MJ bed and bath on same level;no concerns  -MB    Number of Stairs Within Home 1  -MJ     Stair Railings at Home  none  -MB    Type of Financial/Environmental Concern  none  -MB    Transportation Available  car  -MB    Clinical Impression    Date of Referral to PT 01/05/17  -MJ     PT Diagnosis s/p R ankle hardware removal, I&D  -MJ     Criteria for Skilled Therapeutic Interventions Met yes;treatment indicated  -     Pain Assessment    Pain Assessment No/denies pain  -     Cognitive Assessment/Intervention    Current Cognitive/Communication Assessment functional  -     Orientation Status oriented x 4  -MJ     Follows Commands/Answers Questions 100% of the time;able to follow multi-step instructions;needs cueing  -     Personal Safety WNL/WFL  -MJ     ROM (Range of Motion)    General ROM no range  surgery.  No active cardiac problems (such as unstable angina, decompensated heart failure, significant uncontrolled arrhythmias or severe valvular disease).  Surgery is moderate risk  Functional Status: able to climb a flight of stairs (> 4 METS)    Revised Cardiac Risk Index   1. History of ischemic heart disease   2. History of congestive heart failure   3. History of cerebrovascular disease (stroke or transient ischemic attack)   4. History of diabetes requiring preoperative insulin use 5. Chronic kidney disease (creatinine > 2 mg/dL)   5. Undergoing suprainguinal vascular, intraperitoneal, or intrathoracic surgery Risk for cardiac death, nonfatal myocardial infarction, and nonfatal cardiac arrest     Risk of major cardiac event (death, myocardial infarction, or cardiac arrest) or elevation in troponin level 30 days after noncardiac surgery:   0 predictors = 3.9%, 1 predictor = 6.0%, 2 predictors = 10.1%, >=3 predictors = 15% (Chandrakant 2017)    RCRI Calculator Class and Risk percentage: 10.1%    MOST IMPORTANTLY: 13% mortality due to advanced age and undergoing an elective major surgery per JONATHON  https://jamanetwork.com/journals/jamasurgery/article-abstract/4083280#:~:text=Findings%20In%20this%20cohort%20study,the%20year%20after%20major%20surgery    Other Issues:   CAD: Hold Plavix 5 days before surgery and restart after 24hrs of completion    Recommendation:  Relatively low cardiac risk for intermediate risk surgery        2. Stenosis of cervical spine with myelopathy  Overview:  - Chronic cervical neck pain with radiation to shoulders  - MRI Cervical Spine from 3/14/25 s/f  Severe cervical spondylosis, significant for multilevel severe spinal canal stenosis worse at C5-6 where the AP diameter of the canal is no more than 2mm.  Signal changes within the cervical cord, most prominent at C5-C6 concerning for myelomalacia.  Multilevel high-grade neural foraminal narrowing.  - Pt reports no acute change in  of motion deficits identified  -MJ     MMT (Manual Muscle Testing)    General MMT Assessment no strength deficits identified  -MJ     General MMT Assessment Detail R ankle NT  -MJ     Mobility Assessment/Training    Extremity Weight-Bearing Status right lower extremity  -MJ     Right Lower Extremity Weight-Bearing non weight-bearing  -MJ     Bed Mobility, Assessment/Treatment    Bed Mobility, Assistive Device head of bed elevated  -MJ     Bed Mob, Supine to Sit, [place] conditional independence  -MJ     Bed Mob, Sit to Supine, [place] conditional independence  -MJ     Transfer Assessment/Treatment    Transfers, Sit-Stand [place] contact guard assist;verbal cues required  -MJ     Transfers, Stand-Sit [place] contact guard assist;verbal cues required  -MJ     Transfers, Sit-Stand-Sit, Assist Device rolling walker  -MJ     Transfer, Maintain Weight Bearing Status able to maintain weight bearing status  -MJ     Transfer, Safety Issues step length decreased;weight-shifting ability decreased  -MJ     Transfer, Impairments impaired balance  -MJ     Transfer, Comment Verbal cues for correct hand placement and to maintain NWB status R LE  -MJ     Gait Assessment/Treatment    Gait, [place] Level contact guard assist;verbal cues required  -MJ     Gait, Assistive Device rolling walker  -MJ     Gait, Distance (Feet) 4   bed to BSC, BSC to bed  -MJ     Gait, Gait Deviations elma decreased;step length decreased;weight-shifting ability decreased  -MJ     Gait, Maintain Weight Bearing Status able to maintain weight bearing status;cues to maintain weight bearing status  -MJ     Gait, Safety Issues balance decreased during turns;step length decreased;weight-shifting ability decreased  -MJ     Gait, Impairments impaired balance  -MJ     Gait, Comment Pt demo hop to gait pattern to t/f to/from BSC. Verbal cues for WBing status  -MJ     Positioning and Restraints    Pre-Treatment Position in bed  -MJ     Post  symptoms  - Followed by Orthopedics, Pain Management, and Neurosurgery  - Managed with pregablin 50mg TID and Norco 10-325gm    Assessment & Plan:  - Continue management regimen  - Ortho-spine following for arthrodesis  - Pre-operative assessment completed      3. Chronic heart failure with preserved ejection fraction  Overview:  - CABG x3 in 1990, managed with DAPT on ASA and plavix,   - Managed with amlodipine (NORVASC) 10 MG tablet  daily, valsartan 320 MG tablet  daily, metoprolol succinate (TOPROL-XL) 100 MG 24 hr tablet, furosemide (LASIX) 40 MG tablet  as needed for 3lb weight change  - HLD managed with rosuvastatin 40mg qhs and ezetimibe 10mg qd  TTE from 09/21/21  · The left ventricle is normal in size with concentric remodeling and normal systolic function. The estimated ejection fraction is 55%.  · Grade II left ventricular diastolic dysfunction.  · Normal right ventricular size with normal right ventricular systolic function.  · Mild biatrial enlargement.  · Mild mitral regurgitation.  · Mild tricuspid regurgitation.  · The estimated PA systolic pressure is 39 mmHg.  · Normal central venous pressure (3 mmHg).    Assessment & Plan:  Lab Results   Component Value Date     (H) 04/19/2022    BNP 74 12/21/2021   - Patient has Diastolic (HFpEF) heart failure that is Chronic.   - HFpEF is compensated; Pt is euvolemic on examination  - Continue current therapies and PRN lasix for Weight >185lbs  - Continue low sodium diet <2g  - Restrict daily fluid intake to 1.5L  - Check BP and weight daily   - Contact physician for elevations in weight of 5lbs or greater  - Follow up with Cardiology.        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     Orders:  -     albuterol sulfate 2.5 mg/0.5 mL Nebu; Take 2.5 mg by nebulization every 4 (four) hours as needed (wheezing/shortness of breath). Rescue  Dispense: 30 each; Refill: 0    5. ILD (interstitial lung disease)  Overview:  - Followed by [facility] (Pulmonology)  - Not on home oxygen  - Managed with Ofev 150mg BID  - Controlled with Trelegy Ellipta 1 puff qd and Duo-nebs 3ml q6h  - Rescue with PRN CARMELO  - Not longer taking lubiprostone 25mcg qd  - 6MWT 11/11/24: 267 meters, no oxygen desaturation  - PFT 2023: FVC 3.17 (102% of predicted), TLC 4.10 (61% of predicted), DLCO 12.1 (50% of predicted)     Assessment & Plan:  - Continue current management and F/U with Pulmonology as scheduled    Orders:  -     albuterol sulfate 2.5 mg/0.5 mL Nebu; Take 2.5 mg by nebulization every 4 (four) hours as needed (wheezing/shortness of breath). Rescue  Dispense: 30 each; Refill: 0         Follow up in about 3 months (around 8/12/2025).     All questions answered. Pt to call/message the [facility] for any additional concerns    No LOS data to display      This includes face to face time and non-face to face time preparing to see the patient (eg, review of tests), obtaining and/or reviewing separately obtained history, documenting clinical information in the electronic or other health record, independently interpreting results and communicating results to the patient/family/caregiver, or care coordinator.       Tyler Baker MD [facility] - [facility]    This note was generated with the assistance of ambient listening technology. Verbal consent was obtained by the patient and accompanying visitor(s) for the recording of patient appointment to facilitate this note. I attest to having reviewed and edited the generated note for accuracy, though some syntax or spelling errors may persist. Please contact the  Treatment Position bed  -MJ     In Bed notified nsg;supine;call light within reach;encouraged to call for assist  -MJ       User Key  (r) = Recorded By, (t) = Taken By, (c) = Cosigned By    Initials Name Provider Type    MF Willard Mcdonald, PT Physical Therapist    CT Valdemar Pal, PT Physical Therapist    TIM Tesfaye, RN Registered Nurse    DAMARIS Bosch, PT Physical Therapist    SK Sonja C Kellerman, RN Case Manager    BS Freedom Manning, RN Registered Nurse    WILY Adair, PT Physical Therapist            PT Recommendation and Plan  Anticipated Discharge Disposition: home with assist  Planned Therapy Interventions: balance training, bed mobility training, home exercise program, patient/family education, stair training, strengthening, transfer training  PT Frequency: daily    Plan Of Care Reviewed With: patient   Progress: progress toward functional goals as expected   Outcome Summary/Follow up Plan: VAC dressing intact, pump operating normally without issues.  Plan to change wound vac dressing tomorrow and pre-medicate for tx.  Home vac approved and awaiting d/c plans (HH vs. OP).          Outcome Measures       01/07/17 1435 01/05/17 1633       How much help from another person do you currently need...    Turning from your back to your side while in flat bed without using bedrails? 4  -CT 4  -MJ     Moving from lying on back to sitting on the side of a flat bed without bedrails? 4  -CT 4  -MJ     Moving to and from a bed to a chair (including a wheelchair)? 4  -CT 3  -MJ     Standing up from a chair using your arms (e.g., wheelchair, bedside chair)? 4  -CT 3  -MJ     Climbing 3-5 steps with a railing? 3  -CT 2  -MJ     To walk in hospital room? 4  -CT 3  -MJ     AM-PAC 6 Clicks Score 23  -CT 19  -MJ     Functional Assessment    Outcome Measure Options AM-PAC 6 Clicks Basic Mobility (PT)  -CT AM-PAC 6 Clicks Basic Mobility (PT)  -MJ       User Key  (r) = Recorded By, (t) = Taken By,  (c) = Cosigned By    Initials Name Provider Type    CT Valdemar Pal, PT Physical Therapist    DAMARIS Bosch, [place] Physical Therapist              Time Calculation        PT Charges       01/08/17 1000          Time Calculation    Start Time 1000  -JM      PT Goal Re-Cert Due Date 01/05/17  -      Time Calculation- PT    Total Timed Code Minutes- PT 15 minute(s)  -        User Key  (r) = Recorded By, (t) = Taken By, (c) = Cosigned By    Initials Name Provider Type    WILY Adair, PT Physical Therapist             Therapy Charges for Today     Code Description Service Date Service Provider Modifiers Qty    95424448280 HC PT NEG PRESS WOUND TO 50SQCM DME1 1/7/2017 Izabela Adair, PT  1    12143139527 HC PT NEG PRESS WOUND TO 50SQCM DME1 1/8/2017 Izabela Adair, PT  1            PT G-Codes  Outcome Measure Options: AM-PAC 6 Clicks Basic Mobility (PT)        Izabela Adair, PT  1/8/2017        author of this note for any clarification.

## (undated) DEVICE — 2000CC GUARDIAN II: Brand: GUARDIAN

## (undated) DEVICE — MEDI-VAC YANKAUER SUCTION HANDLE W/BULBOUS TIP: Brand: CARDINAL HEALTH

## (undated) DEVICE — DRSNG TELFA PAD NONADH STR 1S 3X8IN

## (undated) DEVICE — SOL LR 1000ML

## (undated) DEVICE — PK EXTREM LOWR 10

## (undated) DEVICE — SUT MNCRYL 2/0 SH 27IN UD MCP417H

## (undated) DEVICE — INTENDED FOR TISSUE SEPARATION, AND OTHER PROCEDURES THAT REQUIRE A SHARP SURGICAL BLADE TO PUNCTURE OR CUT.: Brand: BARD-PARKER ® SAFETYLOCK CARBON RIB-BACK BLADES

## (undated) DEVICE — SUT ETHLN 3/0 PC5 18IN 1893G

## (undated) DEVICE — INTENDED FOR TISSUE SEPARATION, AND OTHER PROCEDURES THAT REQUIRE A SHARP SURGICAL BLADE TO PUNCTURE OR CUT.: Brand: BARD-PARKER ® STAINLESS STEEL BLADES

## (undated) DEVICE — GUARDIAN LVC: Brand: GUARDIAN

## (undated) DEVICE — 3M™ WARMING BLANKET, UPPER BODY, 10 PER CASE, 42268: Brand: BAIR HUGGER™

## (undated) DEVICE — MEDI-VAC NON-CONDUCTIVE SUCTION TUBING: Brand: CARDINAL HEALTH

## (undated) DEVICE — DELFI MATCHING LIMB PROTECTION SLEEVES (MLPS) HELP PROTECT THE PATIENT’S LIMB FROM POSSIBLE WRINKLING, PINCHING AND SHEARING OF SKIN AND SOFT TISSUES OF THE LIMB.EACH DELFI MATCHING LIMB PROTECTION SLEEVE IS INTENDED FOR USE WITH A SPECIFIC DELFI TOURNIQUET CUFF. THIS SLEEVE IS SPECIFICALLY FOR THIGH.: Brand: MATCHING LIMB PROTECTION SLEEVES - VARI-FIT

## (undated) DEVICE — CVR HNDL LT SURG ACCSSRY BLU STRL

## (undated) DEVICE — BNDG ELAS ELITE V/CLOSE 4IN 5YD LF STRL

## (undated) DEVICE — PAD GRND REM POLYHESIVE A/ DISP

## (undated) DEVICE — SNAP KOVER: Brand: UNBRANDED

## (undated) DEVICE — SPNG GZ WOVN 4X4IN 12PLY 10/BX STRL

## (undated) DEVICE — 1010 S-DRAPE TOWEL DRAPE 10/BX: Brand: STERI-DRAPE™

## (undated) DEVICE — UNDERGLV SURG BIOGEL INDICAT PF 61/2 GRN

## (undated) DEVICE — SUT MNCRYL 2/0 SH 27IN Y417H

## (undated) DEVICE — CONTAINER,SPECIMEN,OR STERILE,4OZ: Brand: MEDLINE

## (undated) DEVICE — UNDERCAST PADDING: Brand: DEROYAL

## (undated) DEVICE — DISPOSABLE TOURNIQUET CUFF SINGLE BLADDER, DUAL PORT AND QUICK CONNECT CONNECTOR: Brand: COLOR CUFF

## (undated) DEVICE — APPL CHLORAPREP W/TINT 26ML BLU